# Patient Record
Sex: FEMALE | Race: WHITE | Employment: OTHER | URBAN - NONMETROPOLITAN AREA
[De-identification: names, ages, dates, MRNs, and addresses within clinical notes are randomized per-mention and may not be internally consistent; named-entity substitution may affect disease eponyms.]

---

## 2017-01-10 PROBLEM — I73.9 PAD (PERIPHERAL ARTERY DISEASE) (HCC): Status: ACTIVE | Noted: 2017-01-10

## 2017-01-19 ENCOUNTER — OFFICE VISIT (OUTPATIENT)
Dept: PHYSICAL MEDICINE AND REHAB | Age: 57
End: 2017-01-19

## 2017-01-19 VITALS
WEIGHT: 164.9 LBS | HEART RATE: 87 BPM | DIASTOLIC BLOOD PRESSURE: 85 MMHG | HEIGHT: 65 IN | BODY MASS INDEX: 27.47 KG/M2 | SYSTOLIC BLOOD PRESSURE: 137 MMHG

## 2017-01-19 DIAGNOSIS — E11.42 DIABETIC POLYNEUROPATHY ASSOCIATED WITH TYPE 2 DIABETES MELLITUS (HCC): ICD-10-CM

## 2017-01-19 DIAGNOSIS — S88.111D COMPLETE BELOW KNEE AMPUTATION OF RIGHT LOWER EXTREMITY, SUBSEQUENT ENCOUNTER: Primary | ICD-10-CM

## 2017-01-19 DIAGNOSIS — G54.6 PHANTOM LIMB PAIN (HCC): ICD-10-CM

## 2017-01-19 DIAGNOSIS — I69.351 HEMIPARESIS AFFECTING RIGHT SIDE AS LATE EFFECT OF STROKE (HCC): ICD-10-CM

## 2017-01-19 DIAGNOSIS — M79.605 LEFT LEG PAIN: ICD-10-CM

## 2017-01-19 PROCEDURE — G8427 DOCREV CUR MEDS BY ELIG CLIN: HCPCS | Performed by: PHYSICAL MEDICINE & REHABILITATION

## 2017-01-19 PROCEDURE — 3017F COLORECTAL CA SCREEN DOC REV: CPT | Performed by: PHYSICAL MEDICINE & REHABILITATION

## 2017-01-19 PROCEDURE — G8484 FLU IMMUNIZE NO ADMIN: HCPCS | Performed by: PHYSICAL MEDICINE & REHABILITATION

## 2017-01-19 PROCEDURE — 3046F HEMOGLOBIN A1C LEVEL >9.0%: CPT | Performed by: PHYSICAL MEDICINE & REHABILITATION

## 2017-01-19 PROCEDURE — 3014F SCREEN MAMMO DOC REV: CPT | Performed by: PHYSICAL MEDICINE & REHABILITATION

## 2017-01-19 PROCEDURE — 99213 OFFICE O/P EST LOW 20 MIN: CPT | Performed by: PHYSICAL MEDICINE & REHABILITATION

## 2017-01-19 PROCEDURE — G8419 CALC BMI OUT NRM PARAM NOF/U: HCPCS | Performed by: PHYSICAL MEDICINE & REHABILITATION

## 2017-01-19 PROCEDURE — G8598 ASA/ANTIPLAT THER USED: HCPCS | Performed by: PHYSICAL MEDICINE & REHABILITATION

## 2017-01-19 PROCEDURE — 4004F PT TOBACCO SCREEN RCVD TLK: CPT | Performed by: PHYSICAL MEDICINE & REHABILITATION

## 2017-01-24 ENCOUNTER — TELEPHONE (OUTPATIENT)
Dept: PHYSICAL MEDICINE AND REHAB | Age: 57
End: 2017-01-24

## 2017-01-25 DIAGNOSIS — F41.1 GAD (GENERALIZED ANXIETY DISORDER): ICD-10-CM

## 2017-01-25 RX ORDER — ALPRAZOLAM 1 MG/1
1 TABLET ORAL 3 TIMES DAILY PRN
Qty: 90 TABLET | Refills: 0 | Status: ON HOLD | OUTPATIENT
Start: 2017-01-25 | End: 2017-12-18 | Stop reason: ALTCHOICE

## 2017-02-15 ENCOUNTER — TELEPHONE (OUTPATIENT)
Dept: PHYSICAL MEDICINE AND REHAB | Age: 57
End: 2017-02-15

## 2017-02-15 DIAGNOSIS — G54.6 PHANTOM LIMB PAIN (HCC): ICD-10-CM

## 2017-02-15 DIAGNOSIS — E11.42 DIABETIC POLYNEUROPATHY ASSOCIATED WITH TYPE 2 DIABETES MELLITUS (HCC): ICD-10-CM

## 2017-02-16 ENCOUNTER — OFFICE VISIT (OUTPATIENT)
Dept: PHYSICAL MEDICINE AND REHAB | Age: 57
End: 2017-02-16

## 2017-02-16 VITALS
HEART RATE: 75 BPM | BODY MASS INDEX: 27.47 KG/M2 | DIASTOLIC BLOOD PRESSURE: 85 MMHG | WEIGHT: 164.9 LBS | SYSTOLIC BLOOD PRESSURE: 176 MMHG | HEIGHT: 65 IN

## 2017-02-16 DIAGNOSIS — M25.572 ACUTE LEFT ANKLE PAIN: ICD-10-CM

## 2017-02-16 DIAGNOSIS — E11.42 DIABETIC POLYNEUROPATHY ASSOCIATED WITH TYPE 2 DIABETES MELLITUS (HCC): ICD-10-CM

## 2017-02-16 DIAGNOSIS — F41.1 GAD (GENERALIZED ANXIETY DISORDER): ICD-10-CM

## 2017-02-16 DIAGNOSIS — G90.50 RSD (REFLEX SYMPATHETIC DYSTROPHY): ICD-10-CM

## 2017-02-16 DIAGNOSIS — G62.9 NEUROPATHY: ICD-10-CM

## 2017-02-16 DIAGNOSIS — I69.351 HEMIPARESIS AFFECTING RIGHT SIDE AS LATE EFFECT OF STROKE (HCC): ICD-10-CM

## 2017-02-16 DIAGNOSIS — S88.111D COMPLETE BELOW KNEE AMPUTATION OF RIGHT LOWER EXTREMITY, SUBSEQUENT ENCOUNTER: ICD-10-CM

## 2017-02-16 DIAGNOSIS — M79.605 LEFT LEG PAIN: ICD-10-CM

## 2017-02-16 DIAGNOSIS — Z89.511 STATUS POST BELOW KNEE AMPUTATION OF RIGHT LOWER EXTREMITY (HCC): ICD-10-CM

## 2017-02-16 DIAGNOSIS — Z89.511 S/P BKA (BELOW KNEE AMPUTATION) UNILATERAL, RIGHT (HCC): ICD-10-CM

## 2017-02-16 DIAGNOSIS — M46.1 SACROILIAC INFLAMMATION (HCC): ICD-10-CM

## 2017-02-16 DIAGNOSIS — M79.7 FIBROMYALGIA: ICD-10-CM

## 2017-02-16 DIAGNOSIS — M47.26 OSTEOARTHRITIS OF SPINE WITH RADICULOPATHY, LUMBAR REGION: Primary | ICD-10-CM

## 2017-02-16 DIAGNOSIS — G54.6 PHANTOM LIMB PAIN (HCC): ICD-10-CM

## 2017-02-16 PROCEDURE — G8484 FLU IMMUNIZE NO ADMIN: HCPCS | Performed by: PAIN MEDICINE

## 2017-02-16 PROCEDURE — 3046F HEMOGLOBIN A1C LEVEL >9.0%: CPT | Performed by: PAIN MEDICINE

## 2017-02-16 PROCEDURE — G8419 CALC BMI OUT NRM PARAM NOF/U: HCPCS | Performed by: PAIN MEDICINE

## 2017-02-16 PROCEDURE — 99215 OFFICE O/P EST HI 40 MIN: CPT | Performed by: PAIN MEDICINE

## 2017-02-16 PROCEDURE — G8598 ASA/ANTIPLAT THER USED: HCPCS | Performed by: PAIN MEDICINE

## 2017-02-16 PROCEDURE — G8427 DOCREV CUR MEDS BY ELIG CLIN: HCPCS | Performed by: PAIN MEDICINE

## 2017-02-16 PROCEDURE — 4004F PT TOBACCO SCREEN RCVD TLK: CPT | Performed by: PAIN MEDICINE

## 2017-02-16 PROCEDURE — 3017F COLORECTAL CA SCREEN DOC REV: CPT | Performed by: PAIN MEDICINE

## 2017-02-16 PROCEDURE — 3014F SCREEN MAMMO DOC REV: CPT | Performed by: PAIN MEDICINE

## 2017-02-16 RX ORDER — PREGABALIN 75 MG/1
75 CAPSULE ORAL 3 TIMES DAILY
Qty: 90 CAPSULE | Refills: 1 | Status: SHIPPED | OUTPATIENT
Start: 2017-02-16 | End: 2017-03-22 | Stop reason: SDUPTHER

## 2017-02-16 RX ORDER — OXYCODONE AND ACETAMINOPHEN 7.5; 325 MG/1; MG/1
1 TABLET ORAL EVERY 6 HOURS PRN
Qty: 60 TABLET | Refills: 0 | Status: SHIPPED | OUTPATIENT
Start: 2017-02-16 | End: 2017-03-03

## 2017-02-16 RX ORDER — IBUPROFEN 800 MG/1
800 TABLET ORAL EVERY 8 HOURS PRN
Qty: 90 TABLET | Refills: 1 | Status: SHIPPED | OUTPATIENT
Start: 2017-02-16 | End: 2017-03-28 | Stop reason: SDUPTHER

## 2017-02-16 RX ORDER — PHENAZOPYRIDINE HYDROCHLORIDE 200 MG/1
TABLET, FILM COATED ORAL
Refills: 0 | COMMUNITY
Start: 2017-02-07 | End: 2017-09-25 | Stop reason: ALTCHOICE

## 2017-02-16 ASSESSMENT — ENCOUNTER SYMPTOMS
SINUS PRESSURE: 0
COLOR CHANGE: 0
SHORTNESS OF BREATH: 0
ABDOMINAL PAIN: 0
DIARRHEA: 0
CHEST TIGHTNESS: 0
WHEEZING: 0
BACK PAIN: 0
COUGH: 0
VOMITING: 0
RHINORRHEA: 0
CONSTIPATION: 0
NAUSEA: 0
EYE PAIN: 0
PHOTOPHOBIA: 0
SORE THROAT: 0

## 2017-03-06 RX ORDER — OXYCODONE AND ACETAMINOPHEN 7.5; 325 MG/1; MG/1
1 TABLET ORAL EVERY 6 HOURS PRN
Qty: 120 TABLET | Refills: 0 | Status: SHIPPED | OUTPATIENT
Start: 2017-03-06 | End: 2017-03-22 | Stop reason: SDUPTHER

## 2017-03-22 ENCOUNTER — OFFICE VISIT (OUTPATIENT)
Dept: PHYSICAL MEDICINE AND REHAB | Age: 57
End: 2017-03-22

## 2017-03-22 VITALS
BODY MASS INDEX: 27.49 KG/M2 | HEIGHT: 65 IN | DIASTOLIC BLOOD PRESSURE: 94 MMHG | SYSTOLIC BLOOD PRESSURE: 196 MMHG | WEIGHT: 165 LBS | HEART RATE: 86 BPM

## 2017-03-22 DIAGNOSIS — M79.605 LEFT LEG PAIN: ICD-10-CM

## 2017-03-22 DIAGNOSIS — G54.6 PHANTOM LIMB PAIN (HCC): ICD-10-CM

## 2017-03-22 DIAGNOSIS — E11.42 DIABETIC POLYNEUROPATHY ASSOCIATED WITH TYPE 2 DIABETES MELLITUS (HCC): ICD-10-CM

## 2017-03-22 DIAGNOSIS — G62.9 NEUROPATHY: ICD-10-CM

## 2017-03-22 DIAGNOSIS — M47.26 OSTEOARTHRITIS OF SPINE WITH RADICULOPATHY, LUMBAR REGION: ICD-10-CM

## 2017-03-22 DIAGNOSIS — Z89.511 S/P BKA (BELOW KNEE AMPUTATION) UNILATERAL, RIGHT (HCC): ICD-10-CM

## 2017-03-22 DIAGNOSIS — I69.351 HEMIPARESIS AFFECTING RIGHT SIDE AS LATE EFFECT OF STROKE (HCC): ICD-10-CM

## 2017-03-22 DIAGNOSIS — G90.50 RSD (REFLEX SYMPATHETIC DYSTROPHY): Primary | ICD-10-CM

## 2017-03-22 DIAGNOSIS — M46.1 SACROILIAC INFLAMMATION (HCC): ICD-10-CM

## 2017-03-22 PROCEDURE — G8598 ASA/ANTIPLAT THER USED: HCPCS | Performed by: PAIN MEDICINE

## 2017-03-22 PROCEDURE — G8484 FLU IMMUNIZE NO ADMIN: HCPCS | Performed by: PAIN MEDICINE

## 2017-03-22 PROCEDURE — 4004F PT TOBACCO SCREEN RCVD TLK: CPT | Performed by: PAIN MEDICINE

## 2017-03-22 PROCEDURE — 99214 OFFICE O/P EST MOD 30 MIN: CPT | Performed by: PAIN MEDICINE

## 2017-03-22 PROCEDURE — 3046F HEMOGLOBIN A1C LEVEL >9.0%: CPT | Performed by: PAIN MEDICINE

## 2017-03-22 PROCEDURE — G8419 CALC BMI OUT NRM PARAM NOF/U: HCPCS | Performed by: PAIN MEDICINE

## 2017-03-22 PROCEDURE — 3014F SCREEN MAMMO DOC REV: CPT | Performed by: PAIN MEDICINE

## 2017-03-22 PROCEDURE — G8427 DOCREV CUR MEDS BY ELIG CLIN: HCPCS | Performed by: PAIN MEDICINE

## 2017-03-22 PROCEDURE — 3017F COLORECTAL CA SCREEN DOC REV: CPT | Performed by: PAIN MEDICINE

## 2017-03-22 RX ORDER — PREGABALIN 75 MG/1
75 CAPSULE ORAL 3 TIMES DAILY
Qty: 90 CAPSULE | Refills: 1 | Status: SHIPPED | OUTPATIENT
Start: 2017-03-22 | End: 2017-08-16 | Stop reason: SDUPTHER

## 2017-03-22 RX ORDER — OXYCODONE AND ACETAMINOPHEN 7.5; 325 MG/1; MG/1
1 TABLET ORAL EVERY 6 HOURS PRN
Qty: 120 TABLET | Refills: 0 | Status: SHIPPED | OUTPATIENT
Start: 2017-03-22 | End: 2017-04-21

## 2017-03-22 ASSESSMENT — ENCOUNTER SYMPTOMS
EYE PAIN: 0
WHEEZING: 0
COLOR CHANGE: 0
CONSTIPATION: 0
BACK PAIN: 0
PHOTOPHOBIA: 0
COUGH: 0
NAUSEA: 0
DIARRHEA: 0
CHEST TIGHTNESS: 0
SORE THROAT: 0
SHORTNESS OF BREATH: 0
VOMITING: 0
SINUS PRESSURE: 0
ABDOMINAL PAIN: 0
RHINORRHEA: 0

## 2017-03-28 DIAGNOSIS — M79.605 LEFT LEG PAIN: ICD-10-CM

## 2017-03-28 DIAGNOSIS — M46.1 SACROILIAC INFLAMMATION (HCC): ICD-10-CM

## 2017-03-28 DIAGNOSIS — M47.26 OSTEOARTHRITIS OF SPINE WITH RADICULOPATHY, LUMBAR REGION: ICD-10-CM

## 2017-03-28 DIAGNOSIS — G62.9 NEUROPATHY: ICD-10-CM

## 2017-03-28 DIAGNOSIS — G54.6 PHANTOM LIMB PAIN (HCC): ICD-10-CM

## 2017-03-28 DIAGNOSIS — Z89.511 S/P BKA (BELOW KNEE AMPUTATION) UNILATERAL, RIGHT (HCC): ICD-10-CM

## 2017-03-28 DIAGNOSIS — E11.42 DIABETIC POLYNEUROPATHY ASSOCIATED WITH TYPE 2 DIABETES MELLITUS (HCC): ICD-10-CM

## 2017-03-28 DIAGNOSIS — I69.351 HEMIPARESIS AFFECTING RIGHT SIDE AS LATE EFFECT OF STROKE (HCC): ICD-10-CM

## 2017-03-28 DIAGNOSIS — G90.50 RSD (REFLEX SYMPATHETIC DYSTROPHY): ICD-10-CM

## 2017-03-31 ENCOUNTER — TELEPHONE (OUTPATIENT)
Dept: PHYSICAL MEDICINE AND REHAB | Age: 57
End: 2017-03-31

## 2017-04-13 RX ORDER — PREGABALIN 75 MG/1
75 CAPSULE ORAL EVERY 6 HOURS
Qty: 120 CAPSULE | Refills: 0 | Status: SHIPPED | OUTPATIENT
Start: 2017-04-13 | End: 2017-05-18 | Stop reason: SDUPTHER

## 2017-04-26 ENCOUNTER — TELEPHONE (OUTPATIENT)
Dept: PHYSICAL MEDICINE AND REHAB | Age: 57
End: 2017-04-26

## 2017-05-03 ENCOUNTER — TELEPHONE (OUTPATIENT)
Dept: PHYSICAL MEDICINE AND REHAB | Age: 57
End: 2017-05-03

## 2017-05-04 RX ORDER — OXYCODONE AND ACETAMINOPHEN 7.5; 325 MG/1; MG/1
1 TABLET ORAL EVERY 6 HOURS PRN
Qty: 120 TABLET | Refills: 0 | Status: SHIPPED | OUTPATIENT
Start: 2017-05-04 | End: 2017-05-18 | Stop reason: SDUPTHER

## 2017-05-18 ENCOUNTER — OFFICE VISIT (OUTPATIENT)
Dept: PHYSICAL MEDICINE AND REHAB | Age: 57
End: 2017-05-18

## 2017-05-18 VITALS
HEART RATE: 84 BPM | WEIGHT: 165 LBS | DIASTOLIC BLOOD PRESSURE: 78 MMHG | SYSTOLIC BLOOD PRESSURE: 132 MMHG | HEIGHT: 65 IN | BODY MASS INDEX: 27.49 KG/M2

## 2017-05-18 DIAGNOSIS — Z89.511 STATUS POST BELOW KNEE AMPUTATION OF RIGHT LOWER EXTREMITY (HCC): ICD-10-CM

## 2017-05-18 DIAGNOSIS — G54.6 PHANTOM LIMB PAIN (HCC): ICD-10-CM

## 2017-05-18 DIAGNOSIS — I69.351 HEMIPARESIS AFFECTING RIGHT SIDE AS LATE EFFECT OF STROKE (HCC): ICD-10-CM

## 2017-05-18 DIAGNOSIS — S88.111D COMPLETE BELOW KNEE AMPUTATION OF RIGHT LOWER EXTREMITY, SUBSEQUENT ENCOUNTER: ICD-10-CM

## 2017-05-18 DIAGNOSIS — G90.50 RSD (REFLEX SYMPATHETIC DYSTROPHY): Primary | ICD-10-CM

## 2017-05-18 DIAGNOSIS — Z89.511 S/P BKA (BELOW KNEE AMPUTATION) UNILATERAL, RIGHT (HCC): ICD-10-CM

## 2017-05-18 DIAGNOSIS — F41.1 GAD (GENERALIZED ANXIETY DISORDER): ICD-10-CM

## 2017-05-18 DIAGNOSIS — M25.572 ACUTE LEFT ANKLE PAIN: ICD-10-CM

## 2017-05-18 DIAGNOSIS — E11.42 DIABETIC POLYNEUROPATHY ASSOCIATED WITH TYPE 2 DIABETES MELLITUS (HCC): ICD-10-CM

## 2017-05-18 DIAGNOSIS — M46.1 SACROILIAC INFLAMMATION (HCC): ICD-10-CM

## 2017-05-18 DIAGNOSIS — M47.26 OSTEOARTHRITIS OF SPINE WITH RADICULOPATHY, LUMBAR REGION: ICD-10-CM

## 2017-05-18 DIAGNOSIS — M79.605 LEFT LEG PAIN: ICD-10-CM

## 2017-05-18 DIAGNOSIS — M79.7 FIBROMYALGIA: ICD-10-CM

## 2017-05-18 DIAGNOSIS — G62.9 NEUROPATHY: ICD-10-CM

## 2017-05-18 PROCEDURE — 3046F HEMOGLOBIN A1C LEVEL >9.0%: CPT | Performed by: NURSE PRACTITIONER

## 2017-05-18 PROCEDURE — 4004F PT TOBACCO SCREEN RCVD TLK: CPT | Performed by: NURSE PRACTITIONER

## 2017-05-18 PROCEDURE — G8427 DOCREV CUR MEDS BY ELIG CLIN: HCPCS | Performed by: NURSE PRACTITIONER

## 2017-05-18 PROCEDURE — 3017F COLORECTAL CA SCREEN DOC REV: CPT | Performed by: NURSE PRACTITIONER

## 2017-05-18 PROCEDURE — G8598 ASA/ANTIPLAT THER USED: HCPCS | Performed by: NURSE PRACTITIONER

## 2017-05-18 PROCEDURE — 3014F SCREEN MAMMO DOC REV: CPT | Performed by: NURSE PRACTITIONER

## 2017-05-18 PROCEDURE — G8419 CALC BMI OUT NRM PARAM NOF/U: HCPCS | Performed by: NURSE PRACTITIONER

## 2017-05-18 PROCEDURE — 99215 OFFICE O/P EST HI 40 MIN: CPT | Performed by: NURSE PRACTITIONER

## 2017-05-18 RX ORDER — OXYCODONE AND ACETAMINOPHEN 7.5; 325 MG/1; MG/1
1 TABLET ORAL EVERY 6 HOURS PRN
Qty: 120 TABLET | Refills: 0 | Status: SHIPPED | OUTPATIENT
Start: 2017-06-02 | End: 2017-07-02

## 2017-05-18 RX ORDER — METHYLPREDNISOLONE 4 MG/1
TABLET ORAL
Qty: 1 KIT | Refills: 0 | Status: SHIPPED | OUTPATIENT
Start: 2017-05-18 | End: 2017-05-24

## 2017-05-18 RX ORDER — PREGABALIN 75 MG/1
75 CAPSULE ORAL EVERY 6 HOURS
Qty: 120 CAPSULE | Refills: 0 | Status: SHIPPED | OUTPATIENT
Start: 2017-06-02 | End: 2017-07-13 | Stop reason: DRUGHIGH

## 2017-05-18 RX ORDER — NAPROXEN 500 MG/1
500 TABLET ORAL 2 TIMES DAILY WITH MEALS
Qty: 28 TABLET | Refills: 0 | Status: ON HOLD | OUTPATIENT
Start: 2017-05-18 | End: 2017-12-18 | Stop reason: ALTCHOICE

## 2017-05-18 ASSESSMENT — ENCOUNTER SYMPTOMS
CHEST TIGHTNESS: 0
RECTAL PAIN: 0
ANAL BLEEDING: 0
CHOKING: 0
CONSTIPATION: 0
SORE THROAT: 0
PHOTOPHOBIA: 0
EYE PAIN: 0
EYE REDNESS: 0
WHEEZING: 0
EYE DISCHARGE: 0
DIARRHEA: 0
VOICE CHANGE: 0
ABDOMINAL PAIN: 0
EYE ITCHING: 0
BACK PAIN: 0
NAUSEA: 0
SINUS PRESSURE: 0
VOMITING: 0
COUGH: 0
STRIDOR: 0
APNEA: 0
ABDOMINAL DISTENTION: 0
SHORTNESS OF BREATH: 0
COLOR CHANGE: 0
TROUBLE SWALLOWING: 0
BLOOD IN STOOL: 0
FACIAL SWELLING: 0
RHINORRHEA: 0

## 2017-07-05 RX ORDER — OXYCODONE AND ACETAMINOPHEN 7.5; 325 MG/1; MG/1
1 TABLET ORAL EVERY 6 HOURS PRN
Qty: 8 TABLET | Refills: 0 | Status: SHIPPED | OUTPATIENT
Start: 2017-07-05 | End: 2017-07-06 | Stop reason: SDUPTHER

## 2017-07-06 RX ORDER — OXYCODONE AND ACETAMINOPHEN 7.5; 325 MG/1; MG/1
1 TABLET ORAL EVERY 6 HOURS PRN
Qty: 32 TABLET | Refills: 0 | Status: SHIPPED | OUTPATIENT
Start: 2017-07-06 | End: 2017-07-18 | Stop reason: SDUPTHER

## 2017-07-13 ENCOUNTER — OFFICE VISIT (OUTPATIENT)
Dept: PHYSICAL MEDICINE AND REHAB | Age: 57
End: 2017-07-13

## 2017-07-13 VITALS
HEART RATE: 83 BPM | WEIGHT: 170 LBS | SYSTOLIC BLOOD PRESSURE: 152 MMHG | BODY MASS INDEX: 28.32 KG/M2 | HEIGHT: 65 IN | DIASTOLIC BLOOD PRESSURE: 82 MMHG

## 2017-07-13 DIAGNOSIS — I69.351 HEMIPARESIS AFFECTING RIGHT SIDE AS LATE EFFECT OF STROKE (HCC): ICD-10-CM

## 2017-07-13 DIAGNOSIS — E11.42 DIABETIC POLYNEUROPATHY ASSOCIATED WITH TYPE 2 DIABETES MELLITUS (HCC): ICD-10-CM

## 2017-07-13 DIAGNOSIS — G90.50 RSD (REFLEX SYMPATHETIC DYSTROPHY): Primary | ICD-10-CM

## 2017-07-13 DIAGNOSIS — M79.7 FIBROMYALGIA: ICD-10-CM

## 2017-07-13 DIAGNOSIS — Z89.511 S/P BKA (BELOW KNEE AMPUTATION) UNILATERAL, RIGHT (HCC): ICD-10-CM

## 2017-07-13 DIAGNOSIS — M79.605 LEFT LEG PAIN: ICD-10-CM

## 2017-07-13 DIAGNOSIS — G54.6 PHANTOM LIMB PAIN (HCC): ICD-10-CM

## 2017-07-13 DIAGNOSIS — M47.26 OSTEOARTHRITIS OF SPINE WITH RADICULOPATHY, LUMBAR REGION: ICD-10-CM

## 2017-07-13 DIAGNOSIS — M46.1 SACROILIAC INFLAMMATION (HCC): ICD-10-CM

## 2017-07-13 DIAGNOSIS — G62.9 NEUROPATHY: ICD-10-CM

## 2017-07-13 PROCEDURE — 4004F PT TOBACCO SCREEN RCVD TLK: CPT | Performed by: NURSE PRACTITIONER

## 2017-07-13 PROCEDURE — G8427 DOCREV CUR MEDS BY ELIG CLIN: HCPCS | Performed by: NURSE PRACTITIONER

## 2017-07-13 PROCEDURE — G8419 CALC BMI OUT NRM PARAM NOF/U: HCPCS | Performed by: NURSE PRACTITIONER

## 2017-07-13 PROCEDURE — 99213 OFFICE O/P EST LOW 20 MIN: CPT | Performed by: NURSE PRACTITIONER

## 2017-07-13 PROCEDURE — 3017F COLORECTAL CA SCREEN DOC REV: CPT | Performed by: NURSE PRACTITIONER

## 2017-07-13 PROCEDURE — 3014F SCREEN MAMMO DOC REV: CPT | Performed by: NURSE PRACTITIONER

## 2017-07-13 PROCEDURE — G8598 ASA/ANTIPLAT THER USED: HCPCS | Performed by: NURSE PRACTITIONER

## 2017-07-13 PROCEDURE — 3046F HEMOGLOBIN A1C LEVEL >9.0%: CPT | Performed by: NURSE PRACTITIONER

## 2017-07-13 ASSESSMENT — ENCOUNTER SYMPTOMS
NAUSEA: 0
SINUS PRESSURE: 0
SORE THROAT: 0
CONSTIPATION: 0
PHOTOPHOBIA: 0
SHORTNESS OF BREATH: 0
COLOR CHANGE: 0
ABDOMINAL PAIN: 0
CHEST TIGHTNESS: 0
RHINORRHEA: 0
COUGH: 0
DIARRHEA: 0
BACK PAIN: 1
EYE PAIN: 0
VOMITING: 0
WHEEZING: 0

## 2017-07-14 ENCOUNTER — TELEPHONE (OUTPATIENT)
Dept: PHYSICAL MEDICINE AND REHAB | Age: 57
End: 2017-07-14

## 2017-07-14 DIAGNOSIS — M79.7 FIBROMYALGIA: Primary | ICD-10-CM

## 2017-07-18 RX ORDER — OXYCODONE AND ACETAMINOPHEN 7.5; 325 MG/1; MG/1
1 TABLET ORAL EVERY 6 HOURS PRN
Qty: 120 TABLET | Refills: 0 | Status: SHIPPED | OUTPATIENT
Start: 2017-07-18 | End: 2017-08-16 | Stop reason: SDUPTHER

## 2017-07-19 ENCOUNTER — TELEPHONE (OUTPATIENT)
Dept: PHYSICAL MEDICINE AND REHAB | Age: 57
End: 2017-07-19

## 2017-08-16 DIAGNOSIS — M47.26 OSTEOARTHRITIS OF SPINE WITH RADICULOPATHY, LUMBAR REGION: ICD-10-CM

## 2017-08-16 DIAGNOSIS — M46.1 SACROILIAC INFLAMMATION (HCC): ICD-10-CM

## 2017-08-16 DIAGNOSIS — G54.6 PHANTOM LIMB PAIN (HCC): ICD-10-CM

## 2017-08-16 DIAGNOSIS — G90.50 RSD (REFLEX SYMPATHETIC DYSTROPHY): ICD-10-CM

## 2017-08-16 DIAGNOSIS — G62.9 NEUROPATHY: ICD-10-CM

## 2017-08-16 DIAGNOSIS — E11.42 DIABETIC POLYNEUROPATHY ASSOCIATED WITH TYPE 2 DIABETES MELLITUS (HCC): ICD-10-CM

## 2017-08-16 DIAGNOSIS — I69.351 HEMIPARESIS AFFECTING RIGHT SIDE AS LATE EFFECT OF STROKE (HCC): ICD-10-CM

## 2017-08-16 DIAGNOSIS — M79.605 LEFT LEG PAIN: ICD-10-CM

## 2017-08-16 DIAGNOSIS — Z89.511 S/P BKA (BELOW KNEE AMPUTATION) UNILATERAL, RIGHT (HCC): ICD-10-CM

## 2017-08-16 RX ORDER — PREGABALIN 75 MG/1
75 CAPSULE ORAL EVERY 6 HOURS
Qty: 120 CAPSULE | Refills: 0 | Status: SHIPPED | OUTPATIENT
Start: 2017-08-16 | End: 2017-09-11 | Stop reason: SDUPTHER

## 2017-08-16 RX ORDER — OXYCODONE AND ACETAMINOPHEN 7.5; 325 MG/1; MG/1
1 TABLET ORAL EVERY 6 HOURS PRN
Qty: 120 TABLET | Refills: 0 | Status: SHIPPED | OUTPATIENT
Start: 2017-08-17 | End: 2017-09-11 | Stop reason: SDUPTHER

## 2017-08-25 ENCOUNTER — TELEPHONE (OUTPATIENT)
Dept: PHYSICAL MEDICINE AND REHAB | Age: 57
End: 2017-08-25

## 2017-09-07 ENCOUNTER — HOSPITAL ENCOUNTER (OUTPATIENT)
Age: 57
Setting detail: OUTPATIENT SURGERY
Discharge: HOME | End: 2017-09-07
Attending: PAIN MEDICINE | Admitting: PAIN MEDICINE
Payer: MEDICARE

## 2017-09-07 ENCOUNTER — ANESTHESIA EVENT (OUTPATIENT)
Dept: OPERATING ROOM | Age: 57
End: 2017-09-07
Payer: MEDICARE

## 2017-09-07 ENCOUNTER — APPOINTMENT (OUTPATIENT)
Dept: GENERAL RADIOLOGY | Age: 57
End: 2017-09-07
Attending: PAIN MEDICINE
Payer: MEDICARE

## 2017-09-07 ENCOUNTER — ANESTHESIA (OUTPATIENT)
Dept: OPERATING ROOM | Age: 57
End: 2017-09-07
Payer: MEDICARE

## 2017-09-07 VITALS — DIASTOLIC BLOOD PRESSURE: 92 MMHG | SYSTOLIC BLOOD PRESSURE: 151 MMHG | OXYGEN SATURATION: 99 %

## 2017-09-07 VITALS
RESPIRATION RATE: 15 BRPM | TEMPERATURE: 98.1 F | HEIGHT: 65 IN | BODY MASS INDEX: 28.32 KG/M2 | DIASTOLIC BLOOD PRESSURE: 96 MMHG | SYSTOLIC BLOOD PRESSURE: 150 MMHG | HEART RATE: 88 BPM | WEIGHT: 170 LBS | OXYGEN SATURATION: 96 %

## 2017-09-07 LAB — GLUCOSE BLD-MCNC: 186 MG/DL (ref 70–108)

## 2017-09-07 PROCEDURE — 2580000003 HC RX 258: Performed by: NURSE ANESTHETIST, CERTIFIED REGISTERED

## 2017-09-07 PROCEDURE — 3600000054 HC PAIN LEVEL 3 BASE: Performed by: PAIN MEDICINE

## 2017-09-07 PROCEDURE — 6360000004 HC RX CONTRAST MEDICATION: Performed by: PAIN MEDICINE

## 2017-09-07 PROCEDURE — 3209999900 FLUORO FOR SURGICAL PROCEDURES

## 2017-09-07 PROCEDURE — 3700000001 HC ADD 15 MINUTES (ANESTHESIA): Performed by: PAIN MEDICINE

## 2017-09-07 PROCEDURE — 6360000002 HC RX W HCPCS: Performed by: NURSE ANESTHETIST, CERTIFIED REGISTERED

## 2017-09-07 PROCEDURE — 2500000003 HC RX 250 WO HCPCS: Performed by: NURSE ANESTHETIST, CERTIFIED REGISTERED

## 2017-09-07 PROCEDURE — 77003 FLUOROGUIDE FOR SPINE INJECT: CPT | Performed by: PAIN MEDICINE

## 2017-09-07 PROCEDURE — 7100000011 HC PHASE II RECOVERY - ADDTL 15 MIN: Performed by: PAIN MEDICINE

## 2017-09-07 PROCEDURE — 7100000010 HC PHASE II RECOVERY - FIRST 15 MIN: Performed by: PAIN MEDICINE

## 2017-09-07 PROCEDURE — 64520 N BLOCK LUMBAR/THORACIC: CPT | Performed by: PAIN MEDICINE

## 2017-09-07 PROCEDURE — 3700000000 HC ANESTHESIA ATTENDED CARE: Performed by: PAIN MEDICINE

## 2017-09-07 PROCEDURE — 82948 REAGENT STRIP/BLOOD GLUCOSE: CPT

## 2017-09-07 PROCEDURE — 2500000003 HC RX 250 WO HCPCS: Performed by: PAIN MEDICINE

## 2017-09-07 PROCEDURE — 3600000055 HC PAIN LEVEL 3 ADDL 15 MIN: Performed by: PAIN MEDICINE

## 2017-09-07 RX ORDER — SODIUM CHLORIDE 9 MG/ML
INJECTION, SOLUTION INTRAVENOUS CONTINUOUS PRN
Status: DISCONTINUED | OUTPATIENT
Start: 2017-09-07 | End: 2017-09-07 | Stop reason: SDUPTHER

## 2017-09-07 RX ORDER — PROPOFOL 10 MG/ML
INJECTION, EMULSION INTRAVENOUS PRN
Status: DISCONTINUED | OUTPATIENT
Start: 2017-09-07 | End: 2017-09-07 | Stop reason: SDUPTHER

## 2017-09-07 RX ORDER — BUPIVACAINE HYDROCHLORIDE 2.5 MG/ML
INJECTION, SOLUTION EPIDURAL; INFILTRATION; INTRACAUDAL PRN
Status: DISCONTINUED | OUTPATIENT
Start: 2017-09-07 | End: 2017-09-07 | Stop reason: HOSPADM

## 2017-09-07 RX ORDER — LIDOCAINE HYDROCHLORIDE 10 MG/ML
INJECTION, SOLUTION INFILTRATION; PERINEURAL PRN
Status: DISCONTINUED | OUTPATIENT
Start: 2017-09-07 | End: 2017-09-07 | Stop reason: SDUPTHER

## 2017-09-07 RX ORDER — LIDOCAINE HYDROCHLORIDE 10 MG/ML
INJECTION, SOLUTION INFILTRATION; PERINEURAL PRN
Status: DISCONTINUED | OUTPATIENT
Start: 2017-09-07 | End: 2017-09-07 | Stop reason: HOSPADM

## 2017-09-07 RX ADMIN — LIDOCAINE HYDROCHLORIDE 20 MG: 10 INJECTION, SOLUTION INFILTRATION; PERINEURAL at 13:36

## 2017-09-07 RX ADMIN — PROPOFOL 50 MG: 10 INJECTION, EMULSION INTRAVENOUS at 13:36

## 2017-09-07 RX ADMIN — PROPOFOL 50 MG: 10 INJECTION, EMULSION INTRAVENOUS at 13:43

## 2017-09-07 RX ADMIN — SODIUM CHLORIDE: 9 INJECTION, SOLUTION INTRAVENOUS at 13:27

## 2017-09-07 RX ADMIN — PROPOFOL 50 MG: 10 INJECTION, EMULSION INTRAVENOUS at 13:39

## 2017-09-07 ASSESSMENT — PULMONARY FUNCTION TESTS
PIF_VALUE: 0

## 2017-09-07 ASSESSMENT — PAIN SCALES - GENERAL: PAINLEVEL_OUTOF10: 0

## 2017-09-07 ASSESSMENT — PAIN DESCRIPTION - DESCRIPTORS: DESCRIPTORS: CONSTANT;ACHING

## 2017-09-07 ASSESSMENT — PAIN - FUNCTIONAL ASSESSMENT: PAIN_FUNCTIONAL_ASSESSMENT: 0-10

## 2017-09-11 DIAGNOSIS — G54.6 PHANTOM LIMB PAIN (HCC): ICD-10-CM

## 2017-09-11 DIAGNOSIS — I69.351 HEMIPARESIS AFFECTING RIGHT SIDE AS LATE EFFECT OF STROKE (HCC): ICD-10-CM

## 2017-09-11 DIAGNOSIS — M47.26 OSTEOARTHRITIS OF SPINE WITH RADICULOPATHY, LUMBAR REGION: ICD-10-CM

## 2017-09-11 DIAGNOSIS — G90.50 RSD (REFLEX SYMPATHETIC DYSTROPHY): ICD-10-CM

## 2017-09-11 DIAGNOSIS — G62.9 NEUROPATHY: ICD-10-CM

## 2017-09-11 DIAGNOSIS — M79.605 LEFT LEG PAIN: ICD-10-CM

## 2017-09-11 DIAGNOSIS — Z89.511 S/P BKA (BELOW KNEE AMPUTATION) UNILATERAL, RIGHT (HCC): ICD-10-CM

## 2017-09-11 DIAGNOSIS — E11.42 DIABETIC POLYNEUROPATHY ASSOCIATED WITH TYPE 2 DIABETES MELLITUS (HCC): ICD-10-CM

## 2017-09-11 DIAGNOSIS — M46.1 SACROILIAC INFLAMMATION (HCC): ICD-10-CM

## 2017-09-12 RX ORDER — PREGABALIN 75 MG/1
75 CAPSULE ORAL EVERY 6 HOURS
Qty: 120 CAPSULE | Refills: 0 | Status: SHIPPED | OUTPATIENT
Start: 2017-09-16 | End: 2017-10-16 | Stop reason: SDUPTHER

## 2017-09-12 RX ORDER — OXYCODONE AND ACETAMINOPHEN 7.5; 325 MG/1; MG/1
1 TABLET ORAL EVERY 6 HOURS PRN
Qty: 120 TABLET | Refills: 0 | Status: SHIPPED | OUTPATIENT
Start: 2017-09-16 | End: 2017-10-16 | Stop reason: SDUPTHER

## 2017-09-25 ENCOUNTER — OFFICE VISIT (OUTPATIENT)
Dept: PHYSICAL MEDICINE AND REHAB | Age: 57
End: 2017-09-25
Payer: MEDICARE

## 2017-09-25 VITALS
BODY MASS INDEX: 28.32 KG/M2 | HEART RATE: 84 BPM | WEIGHT: 169.97 LBS | SYSTOLIC BLOOD PRESSURE: 118 MMHG | HEIGHT: 65 IN | DIASTOLIC BLOOD PRESSURE: 79 MMHG

## 2017-09-25 DIAGNOSIS — F41.1 GAD (GENERALIZED ANXIETY DISORDER): ICD-10-CM

## 2017-09-25 DIAGNOSIS — M47.26 OSTEOARTHRITIS OF SPINE WITH RADICULOPATHY, LUMBAR REGION: ICD-10-CM

## 2017-09-25 DIAGNOSIS — G62.9 NEUROPATHY: ICD-10-CM

## 2017-09-25 DIAGNOSIS — Z89.511 S/P BKA (BELOW KNEE AMPUTATION) UNILATERAL, RIGHT (HCC): ICD-10-CM

## 2017-09-25 DIAGNOSIS — M46.1 SACROILIAC INFLAMMATION (HCC): ICD-10-CM

## 2017-09-25 DIAGNOSIS — Z89.511 STATUS POST BELOW KNEE AMPUTATION OF RIGHT LOWER EXTREMITY (HCC): ICD-10-CM

## 2017-09-25 DIAGNOSIS — G54.6 PHANTOM LIMB PAIN (HCC): ICD-10-CM

## 2017-09-25 DIAGNOSIS — G90.50 RSD (REFLEX SYMPATHETIC DYSTROPHY): Primary | ICD-10-CM

## 2017-09-25 PROCEDURE — G8427 DOCREV CUR MEDS BY ELIG CLIN: HCPCS | Performed by: NURSE PRACTITIONER

## 2017-09-25 PROCEDURE — 3017F COLORECTAL CA SCREEN DOC REV: CPT | Performed by: NURSE PRACTITIONER

## 2017-09-25 PROCEDURE — G8598 ASA/ANTIPLAT THER USED: HCPCS | Performed by: NURSE PRACTITIONER

## 2017-09-25 PROCEDURE — 4004F PT TOBACCO SCREEN RCVD TLK: CPT | Performed by: NURSE PRACTITIONER

## 2017-09-25 PROCEDURE — 3014F SCREEN MAMMO DOC REV: CPT | Performed by: NURSE PRACTITIONER

## 2017-09-25 PROCEDURE — 99213 OFFICE O/P EST LOW 20 MIN: CPT | Performed by: NURSE PRACTITIONER

## 2017-09-25 PROCEDURE — G8417 CALC BMI ABV UP PARAM F/U: HCPCS | Performed by: NURSE PRACTITIONER

## 2017-09-25 ASSESSMENT — ENCOUNTER SYMPTOMS
SORE THROAT: 0
EYE DISCHARGE: 0
CONSTIPATION: 0
ANAL BLEEDING: 0
ABDOMINAL PAIN: 0
RESPIRATORY NEGATIVE: 1
DIARRHEA: 0
FACIAL SWELLING: 0
EYE PAIN: 0
SINUS PRESSURE: 0
RECTAL PAIN: 0
EYE ITCHING: 0
VOICE CHANGE: 0
STRIDOR: 0
APNEA: 0
EYE REDNESS: 0
TROUBLE SWALLOWING: 0
CHEST TIGHTNESS: 0
COUGH: 0
RHINORRHEA: 0
VOMITING: 0
GASTROINTESTINAL NEGATIVE: 1
ABDOMINAL DISTENTION: 0
CHOKING: 0
ALLERGIC/IMMUNOLOGIC NEGATIVE: 1
BLOOD IN STOOL: 0
WHEEZING: 0
SHORTNESS OF BREATH: 0
EYES NEGATIVE: 1
PHOTOPHOBIA: 0
COLOR CHANGE: 0
NAUSEA: 0
BACK PAIN: 1

## 2017-10-16 DIAGNOSIS — G54.6 PHANTOM LIMB PAIN (HCC): ICD-10-CM

## 2017-10-16 DIAGNOSIS — G90.50 RSD (REFLEX SYMPATHETIC DYSTROPHY): ICD-10-CM

## 2017-10-16 DIAGNOSIS — M79.605 LEFT LEG PAIN: ICD-10-CM

## 2017-10-16 DIAGNOSIS — G62.9 NEUROPATHY: ICD-10-CM

## 2017-10-16 DIAGNOSIS — Z89.511 S/P BKA (BELOW KNEE AMPUTATION) UNILATERAL, RIGHT (HCC): ICD-10-CM

## 2017-10-16 DIAGNOSIS — M47.26 OSTEOARTHRITIS OF SPINE WITH RADICULOPATHY, LUMBAR REGION: ICD-10-CM

## 2017-10-16 DIAGNOSIS — M46.1 SACROILIAC INFLAMMATION (HCC): ICD-10-CM

## 2017-10-16 DIAGNOSIS — I69.351 HEMIPARESIS AFFECTING RIGHT SIDE AS LATE EFFECT OF STROKE (HCC): ICD-10-CM

## 2017-10-16 DIAGNOSIS — E11.42 DIABETIC POLYNEUROPATHY ASSOCIATED WITH TYPE 2 DIABETES MELLITUS (HCC): ICD-10-CM

## 2017-10-16 RX ORDER — PREGABALIN 75 MG/1
75 CAPSULE ORAL EVERY 6 HOURS
Qty: 120 CAPSULE | Refills: 0 | Status: SHIPPED | OUTPATIENT
Start: 2017-10-19 | End: 2017-11-13 | Stop reason: SDUPTHER

## 2017-10-16 RX ORDER — OXYCODONE AND ACETAMINOPHEN 7.5; 325 MG/1; MG/1
1 TABLET ORAL EVERY 6 HOURS PRN
Qty: 120 TABLET | Refills: 0 | Status: SHIPPED | OUTPATIENT
Start: 2017-10-16 | End: 2017-11-13 | Stop reason: SDUPTHER

## 2017-10-16 NOTE — TELEPHONE ENCOUNTER
OARRS reviewed. UDS: positive for the lyrica and percocet as well as Alprazolam and Pregabalin . Last seen: 9/25/2017.  Follow-up: 10/30/2017

## 2017-11-13 DIAGNOSIS — M79.605 LEFT LEG PAIN: ICD-10-CM

## 2017-11-13 DIAGNOSIS — Z89.511 S/P BKA (BELOW KNEE AMPUTATION) UNILATERAL, RIGHT (HCC): ICD-10-CM

## 2017-11-13 DIAGNOSIS — G54.6 PHANTOM LIMB PAIN (HCC): ICD-10-CM

## 2017-11-13 DIAGNOSIS — G62.9 NEUROPATHY: ICD-10-CM

## 2017-11-13 DIAGNOSIS — E11.42 DIABETIC POLYNEUROPATHY ASSOCIATED WITH TYPE 2 DIABETES MELLITUS (HCC): ICD-10-CM

## 2017-11-13 DIAGNOSIS — M46.1 SACROILIAC INFLAMMATION (HCC): ICD-10-CM

## 2017-11-13 DIAGNOSIS — G90.50 RSD (REFLEX SYMPATHETIC DYSTROPHY): ICD-10-CM

## 2017-11-13 DIAGNOSIS — I69.351 HEMIPARESIS AFFECTING RIGHT SIDE AS LATE EFFECT OF STROKE (HCC): ICD-10-CM

## 2017-11-13 DIAGNOSIS — M47.26 OSTEOARTHRITIS OF SPINE WITH RADICULOPATHY, LUMBAR REGION: ICD-10-CM

## 2017-11-13 RX ORDER — PREGABALIN 75 MG/1
75 CAPSULE ORAL EVERY 6 HOURS
Qty: 120 CAPSULE | Refills: 0 | Status: SHIPPED | OUTPATIENT
Start: 2017-11-16 | End: 2017-12-05 | Stop reason: SDUPTHER

## 2017-11-13 RX ORDER — OXYCODONE AND ACETAMINOPHEN 7.5; 325 MG/1; MG/1
1 TABLET ORAL EVERY 6 HOURS PRN
Qty: 120 TABLET | Refills: 0 | Status: SHIPPED | OUTPATIENT
Start: 2017-11-15 | End: 2017-12-05 | Stop reason: SDUPTHER

## 2017-12-05 ENCOUNTER — OFFICE VISIT (OUTPATIENT)
Dept: PHYSICAL MEDICINE AND REHAB | Age: 57
End: 2017-12-05
Payer: MEDICARE

## 2017-12-05 VITALS
SYSTOLIC BLOOD PRESSURE: 140 MMHG | DIASTOLIC BLOOD PRESSURE: 88 MMHG | HEART RATE: 87 BPM | BODY MASS INDEX: 28.85 KG/M2 | WEIGHT: 169 LBS | HEIGHT: 64 IN

## 2017-12-05 DIAGNOSIS — I69.351 HEMIPARESIS AFFECTING RIGHT SIDE AS LATE EFFECT OF STROKE (HCC): ICD-10-CM

## 2017-12-05 DIAGNOSIS — M79.7 FIBROMYALGIA: ICD-10-CM

## 2017-12-05 DIAGNOSIS — G54.6 PHANTOM LIMB PAIN (HCC): ICD-10-CM

## 2017-12-05 DIAGNOSIS — M46.1 SACROILIAC INFLAMMATION (HCC): ICD-10-CM

## 2017-12-05 DIAGNOSIS — M79.605 LEFT LEG PAIN: ICD-10-CM

## 2017-12-05 DIAGNOSIS — M47.26 OSTEOARTHRITIS OF SPINE WITH RADICULOPATHY, LUMBAR REGION: Primary | ICD-10-CM

## 2017-12-05 DIAGNOSIS — Z89.511 S/P BKA (BELOW KNEE AMPUTATION) UNILATERAL, RIGHT (HCC): ICD-10-CM

## 2017-12-05 DIAGNOSIS — G62.9 NEUROPATHY: ICD-10-CM

## 2017-12-05 DIAGNOSIS — G90.50 RSD (REFLEX SYMPATHETIC DYSTROPHY): ICD-10-CM

## 2017-12-05 DIAGNOSIS — S88.111D COMPLETE BELOW KNEE AMPUTATION OF RIGHT LOWER EXTREMITY, SUBSEQUENT ENCOUNTER: ICD-10-CM

## 2017-12-05 DIAGNOSIS — E11.42 DIABETIC POLYNEUROPATHY ASSOCIATED WITH TYPE 2 DIABETES MELLITUS (HCC): ICD-10-CM

## 2017-12-05 PROCEDURE — G8417 CALC BMI ABV UP PARAM F/U: HCPCS | Performed by: NURSE PRACTITIONER

## 2017-12-05 PROCEDURE — G8598 ASA/ANTIPLAT THER USED: HCPCS | Performed by: NURSE PRACTITIONER

## 2017-12-05 PROCEDURE — 3046F HEMOGLOBIN A1C LEVEL >9.0%: CPT | Performed by: NURSE PRACTITIONER

## 2017-12-05 PROCEDURE — 99213 OFFICE O/P EST LOW 20 MIN: CPT | Performed by: NURSE PRACTITIONER

## 2017-12-05 PROCEDURE — 4004F PT TOBACCO SCREEN RCVD TLK: CPT | Performed by: NURSE PRACTITIONER

## 2017-12-05 PROCEDURE — G8484 FLU IMMUNIZE NO ADMIN: HCPCS | Performed by: NURSE PRACTITIONER

## 2017-12-05 PROCEDURE — 3014F SCREEN MAMMO DOC REV: CPT | Performed by: NURSE PRACTITIONER

## 2017-12-05 PROCEDURE — G8427 DOCREV CUR MEDS BY ELIG CLIN: HCPCS | Performed by: NURSE PRACTITIONER

## 2017-12-05 PROCEDURE — 3017F COLORECTAL CA SCREEN DOC REV: CPT | Performed by: NURSE PRACTITIONER

## 2017-12-05 RX ORDER — PREGABALIN 75 MG/1
75 CAPSULE ORAL EVERY 6 HOURS
Qty: 120 CAPSULE | Refills: 0 | Status: SHIPPED | OUTPATIENT
Start: 2017-12-14 | End: 2018-01-13

## 2017-12-05 RX ORDER — OXYCODONE AND ACETAMINOPHEN 7.5; 325 MG/1; MG/1
1 TABLET ORAL EVERY 6 HOURS PRN
Qty: 120 TABLET | Refills: 0 | Status: SHIPPED | OUTPATIENT
Start: 2017-12-13 | End: 2018-01-12

## 2017-12-05 ASSESSMENT — ENCOUNTER SYMPTOMS
RECTAL PAIN: 0
GASTROINTESTINAL NEGATIVE: 1
BACK PAIN: 1
COLOR CHANGE: 0
ABDOMINAL PAIN: 0
SHORTNESS OF BREATH: 0
EYE ITCHING: 0
APNEA: 0
RHINORRHEA: 0
VOMITING: 0
VOICE CHANGE: 0
CHEST TIGHTNESS: 0
PHOTOPHOBIA: 0
NAUSEA: 0
BLOOD IN STOOL: 0
SORE THROAT: 0
ABDOMINAL DISTENTION: 0
SINUS PRESSURE: 0
EYE REDNESS: 0
EYE DISCHARGE: 0
WHEEZING: 0
RESPIRATORY NEGATIVE: 1
STRIDOR: 0
EYES NEGATIVE: 1
ALLERGIC/IMMUNOLOGIC NEGATIVE: 1
EYE PAIN: 0
COUGH: 0
CHOKING: 0
DIARRHEA: 0
CONSTIPATION: 0
ANAL BLEEDING: 0
FACIAL SWELLING: 0
TROUBLE SWALLOWING: 0

## 2017-12-05 NOTE — PROGRESS NOTES
[facility]  [address]  Dept: [phone]  Dept Fax: [phone]: [phone]    Visit Date: 12/5/2017    Functionality Assessment/Goals Worksheet     On a scale of 0 (Does not Interfere) to 10 (Completely Interferes)     1. Which number describes how during the past week pain has interfered with       the following:  A. General Activity:  0  B. Mood: 7  C. Walking Ability:  5  D. Normal Work (Includes both work outside the home and housework):  0  E. Relations with Other People:   10  F. Sleep:   5  G. Enjoyment of Life:   4    2. Patient Prefers to Take their Pain Medications:     [x]  On a regular basis   []  Only when necessary    []  Does not take pain medications    3. What are the Patient's Goals/Expectations for Visiting Pain Management? [x]  Learn about my pain    [x]  Receive Medication   []  Physical Therapy     []  Treat Depression   []  Receive Injections    []  Treat Sleep   []  Deal with Anxiety and Stress   []  Treat Opoid Dependence/Addiction   []  Other:    HPI:   Eugenia Aguilar is a 61 y.o. female is here today for    Chief Complaint:  Low back pain bilateral leg and foot pain. HPI   Patient here today in wheelchair s/p RBKA  F/U Patient has had LESI, Lumbar Facet TFLESI in the past without relief. Had ordered Psych eval with Dr Harper Malave for possible SCS trial. She states she has not seen him yet, her home health nurse messed up her appointment. She has another appointment coming up. She continues to take Percocet, Lyrica. She states she fell out of bed 3 weeks ago, but her pain is better now. Medications reviewed. Patient denies  side effects with medications. Patient states she is  taking medications as prescribed. She denies receiving pain medications from other sources. She denies any ER visits since last visit. Pain scale with out pain medications is 10/10.   Pain scale with pain medications is  6/10. Last dose of Percocet Was on today  Drug screen reviewed from 2017 OK    The patient is allergic to latex; lorazepam; gabapentin; wellbutrin [bupropion]; and tape [adhesive tape]. Past Medical History  Damon Ernst  has a past medical history of Anxiety; Arthritis; Atherosclerotic PVD with ulceration: Left foot ulcer; CAD (coronary artery disease); Chest pressure; Chronic back pain; Chronic pain; Depression; Fibromyalgia; Hyperlipidemia; Hypertension; Mild mitral regurgitation; Neuropathy ([facility] [address] Onychomycosis; RSD (reflex sympathetic dystrophy); S/P CAB2012; Schizoaffective disorder ([facility] [address] Stroke [facility]); and Type II or unspecified type diabetes mellitus without mention of complication, not stated as uncontrolled. Past Surgical History  The patient  has a past surgical history that includes Hysterectomy; Cholecystectomy; lipoma resection; transthoracic echocardiogram (12); cardiovascular stress test (12); Coronary artery bypass graft (2012); Cardiac surgery (); Foot surgery (Right, 2014); Leg amputation below knee (Right, 14); other surgical history (Right, 10/31/14); other surgical history (Bilateral, 2017); other surgical history (Left, 2017); other surgical history (Bilateral, 2017); and Lumbar spine surgery (N/A, 2017). Family History  This patient's family history includes Cancer in her maternal grandmother and mother; Diabetes in her mother; Heart Disease in her father; Stroke in her mother. Social History  Damon Ernst  reports that she has been smoking Cigarettes. She has a 17.00 pack-year smoking history. She has never used smokeless tobacco. She reports that she does not drink alcohol or use drugs.     Medications    Current Outpatient Prescriptions:     [START ON 2017] oxyCODONE-acetaminophen (PERCOCET) 7.5-325 MG per tablet, Take 1 tablet by mouth every 6 hours as needed for Pain ., Disp: 120 tablet, Rfl: 0   [START ON 12/14/2017] pregabalin (LYRICA) 75 MG capsule, Take 1 capsule by mouth every 6 hours . , Disp: 120 capsule, Rfl: 0    ibuprofen (ADVIL;MOTRIN) 100 MG/5ML suspension, take 40 milliliters by mouth every 8 hours if needed, Disp: 946 mL, Rfl: 2    ALPRAZolam (XANAX) 1 MG tablet, Take 1 tablet by mouth 3 times daily as needed for Anxiety, Disp: 90 tablet, Rfl: 0    DULoxetine (CYMBALTA) 30 MG extended release capsule, Take 1 capsule by mouth daily, Disp: 30 capsule, Rfl: 11    Blood Glucose Monitoring Suppl (ONE TOUCH BASIC SYSTEM) W/DEVICE KIT, 1 kit by Does not apply route daily, Disp: 1 kit, Rfl: 0    SEROQUEL  MG XR tablet, take 1 tablet by mouth every evening, Disp: 30 tablet, Rfl: 5    metoprolol tartrate (LOPRESSOR) 25 MG tablet, Take 1 tablet by mouth 2 times daily, Disp: 60 tablet, Rfl: 5    lisinopril (PRINIVIL;ZESTRIL) 20 MG tablet, Take 1 tablet by mouth daily, Disp: 30 tablet, Rfl: 5    metFORMIN (GLUCOPHAGE) 500 MG tablet, Take 1 tablet by mouth 2 times daily (with meals), Disp: 60 tablet, Rfl: 5    diclofenac sodium 1 % GEL, Apply 2 g topically 3 times daily, Disp: 3 Tube, Rfl: 5    Elastic Bandages & Supports (POST-OP SHOE/SOFT TOP WOMEN) MISC, Wear when using foot to propel wheelchair. , Disp: 1 each, Rfl: 0    glucose monitoring kit (FREESTYLE) monitoring kit, Dispense ALL required supplies to test 4 times daily. Dx: 250.02 Any brand device is acceptable., Disp: 1 kit, Rfl: 11    insulin lispro (HUMALOG) 100 UNIT/ML injection vial, Inject 0-12 Units into the skin 3 times daily (before meals). (Patient taking differently: Inject 0-12 Units into the skin daily ), Disp: 1 vial, Rfl: 3    insulin glargine (LANTUS) 100 UNIT/ML injection pen, Inject 28 Units into the skin daily. , Disp: 3 Pen, Rfl: 0    Insulin Pen Needle 31G X 5 MM MISC, Inject insulin SQ 4 x daily (31Gx 3/16\") 5mm, Disp: 200 each, Rfl: 5    glucose blood VI test strips (ASCENSIA AUTODISC VI;ONE TOUCH ULTRA TEST VI) strip, Add lancets. Check 4 times per day; Patient needs NEW METER, STRIPS PLUS LANCETS. TESTING 4 TIMES DAILY, Disp: 200 strip, Rfl: 5    nitroGLYCERIN (NITROSTAT) 0.4 MG SL tablet, Place 1 tablet under the tongue every 5 minutes as needed for Chest pain., Disp: 25 tablet, Rfl: 3    aspirin 81 MG EC tablet, Take 81 mg by mouth daily. , Disp: , Rfl:     naproxen (NAPROSYN) 500 MG tablet, Take 1 tablet by mouth 2 times daily (with meals) for 14 days, Disp: 28 tablet, Rfl: 0    Subjective:      Review of Systems   Constitutional: Positive for activity change. Negative for appetite change, chills, diaphoresis, fatigue, fever and unexpected weight change. HENT: Negative. Negative for congestion, dental problem, drooling, ear discharge, ear pain, facial swelling, hearing loss, mouth sores, nosebleeds, postnasal drip, rhinorrhea, sinus pressure, sneezing, sore throat, tinnitus, trouble swallowing and voice change. Eyes: Negative. Negative for photophobia, pain, discharge, redness, itching and visual disturbance. Respiratory: Negative. Negative for apnea, cough, choking, chest tightness, shortness of breath, wheezing and stridor. Cardiovascular: Negative. Negative for chest pain, palpitations and leg swelling. Gastrointestinal: Negative. Negative for abdominal distention, abdominal pain, anal bleeding, blood in stool, constipation, diarrhea, nausea, rectal pain and vomiting. Endocrine: Negative. Negative for cold intolerance, heat intolerance, polydipsia, polyphagia and polyuria. Genitourinary: Negative. Negative for decreased urine volume, difficulty urinating, dyspareunia, dysuria, enuresis, flank pain, frequency, genital sores, hematuria, menstrual problem, pelvic pain, urgency, vaginal bleeding, vaginal discharge and vaginal pain. Musculoskeletal: Positive for arthralgias, back pain, gait problem, joint swelling, myalgias, neck pain and neck stiffness. Skin: Negative.   Negative for color change, pallor, rash and wound. Allergic/Immunologic: Negative. Negative for environmental allergies, food allergies and immunocompromised state. Neurological: Positive for weakness and numbness. Negative for dizziness, tremors, seizures, syncope, facial asymmetry, speech difficulty, light-headedness and headaches. Hematological: Negative. Negative for adenopathy. Does not bruise/bleed easily. Psychiatric/Behavioral: Negative. Negative for agitation, behavioral problems, confusion, decreased concentration, dysphoric mood, hallucinations, self-injury, sleep disturbance and suicidal ideas. The patient is not nervous/anxious and is not hyperactive. Objective:     Vitals:    12/05/17 1556   BP: (!) 140/88   Pulse: 87   Weight: 169 lb (76.7 kg)   Height: 5' 4\" (1.626 m)       Physical Exam   Constitutional: She is oriented to person, place, and time. She appears well-developed and well-nourished. No distress. HENT:   Head: Normocephalic and atraumatic. Right Ear: External ear normal.   Left Ear: External ear normal.   Nose: Nose normal.   Mouth/Throat: Oropharynx is clear and moist. No oropharyngeal exudate. Eyes: Conjunctivae and EOM are normal. Pupils are equal, round, and reactive to light. Right eye exhibits no discharge. Left eye exhibits no discharge. No scleral icterus. Neck: Normal range of motion and full passive range of motion without pain. Neck supple. No muscular tenderness present. No neck rigidity. No edema, no erythema and normal range of motion present. No thyromegaly present. Cardiovascular: Normal rate, regular rhythm, normal heart sounds and intact distal pulses. Exam reveals no gallop and no friction rub. No murmur heard. Pulmonary/Chest: Effort normal and breath sounds normal. No respiratory distress. She has no wheezes. She has no rales. She exhibits no tenderness. Abdominal: Soft. Bowel sounds are normal. She exhibits no distension. There is no tenderness. There is no rebound and no guarding. Musculoskeletal: She exhibits tenderness. Right shoulder: She exhibits decreased range of motion. Right elbow: She exhibits decreased range of motion. Right wrist: She exhibits decreased range of motion. Right hip: She exhibits tenderness. Left hip: She exhibits tenderness. Right knee: She exhibits decreased range of motion. Left knee: Tenderness found. Right ankle: She exhibits deformity. Left ankle: Tenderness. Cervical back: She exhibits tenderness. Thoracic back: She exhibits tenderness. Lumbar back: She exhibits tenderness, pain and spasm. Back:         Left upper arm: Normal.        Left forearm: Normal.        Right hand: She exhibits decreased range of motion. Left hand: Normal.        Right upper leg: She exhibits tenderness. Left upper leg: She exhibits tenderness. Right lower leg: She exhibits deformity. Left lower leg: She exhibits tenderness and swelling. Legs:       Right foot: There is deformity. Left foot: There is decreased range of motion, tenderness, bony tenderness and swelling. Feet:    Neurological: She is alert and oriented to person, place, and time. She is not disoriented. She displays no atrophy. A sensory deficit is present. No cranial nerve deficit. She exhibits normal muscle tone. She displays a negative Romberg sign. Gait abnormal. Coordination normal. She displays no Babinski's sign on the right side. Reflex Scores:       Tricep reflexes are 1+ on the right side and 1+ on the left side. Bicep reflexes are 1+ on the right side and 1+ on the left side. Brachioradialis reflexes are 1+ on the right side and 1+ on the left side. Patellar reflexes are 1+ on the left side. Achilles reflexes are 1+ on the left side.   MOTOR 4/5 LUE LLE 3/5 RUE RLE WITH RBKA  PHANTOM PAIN TO RLE  R HEMIPARESIS RESIDUAL FROM CVA  EXPRESSIVE APHASIA, DYSARTHRIA  LLE allodynia, increase sensitivity, swelling, skin shiny   Skin: Skin is warm. No rash noted. She is not diaphoretic. No erythema. No pallor. Psychiatric: She has a normal mood and affect. Her behavior is normal. Judgment and thought content normal. Her mood appears not anxious. Her affect is not angry, not blunt, not labile and not inappropriate. Her speech is not rapid and/or pressured, not delayed, not tangential and not slurred. She is not agitated, not aggressive, not hyperactive, not slowed, not withdrawn, not actively hallucinating and not combative. Thought content is not paranoid and not delusional. Cognition and memory are not impaired. She does not express impulsivity or inappropriate judgment. She does not exhibit a depressed mood. She expresses no homicidal and no suicidal ideation. She expresses no suicidal plans and no homicidal plans. She is communicative. She exhibits normal recent memory and normal remote memory. She is attentive. Nursing note and vitals reviewed. Assessment:     1. Osteoarthritis of spine with radiculopathy, lumbar region    2. Sacroiliac inflammation ([address]    3. Diabetic polyneuropathy associated with type 2 diabetes mellitus ([address]    4. Phantom limb pain ([address]    5. RSD (reflex sympathetic dystrophy)    6. Neuropathy ([address]    7. Left leg pain    8. Fibromyalgia    9. S/P BKA (below knee amputation) unilateral, right ([address]    10. Complete below knee amputation of right lower extremity, subsequent encounter ([address]    11. Hemiparesis affecting right side as late effect of stroke ([address]            Plan:      · OARRS reviewed. · Discussed long term side effects of medications. · Discussed tolerance, dependency and addiction. · Previous UDS reviewed. · UDS preformed today for compliance. · Patient told can not receive any pain medications from any other source. · Discussed with pt.may not be pain free.   · No

## 2017-12-11 ENCOUNTER — OFFICE VISIT (OUTPATIENT)
Dept: PSYCHIATRY | Age: 57
End: 2017-12-11
Payer: MEDICARE

## 2017-12-11 DIAGNOSIS — F25.0 SCHIZOAFFECTIVE DISORDER, BIPOLAR TYPE (HCC): Primary | ICD-10-CM

## 2017-12-11 PROCEDURE — 90792 PSYCH DIAG EVAL W/MED SRVCS: CPT | Performed by: PSYCHIATRY & NEUROLOGY

## 2017-12-11 PROCEDURE — 4004F PT TOBACCO SCREEN RCVD TLK: CPT | Performed by: PSYCHIATRY & NEUROLOGY

## 2017-12-11 NOTE — PROGRESS NOTES
Chief Complaint   Patient presents with    Psychiatric Evaluation     SCS evaluation     Scar Kunz is a 59-year-old single  female referred by Dr. Vivian Castro for spinal cord stimulator evaluation. I have actually seen her before, in the hospital.  She was admitted once to neurology in May 2014 and seen in consultation. She was subsequently transferred to psychiatry where I evaluated her and reached a diagnosis of schizoaffective disorder, bipolar subtype. She had been a patient of Dr. Belkys Weinberg prior to his leaving town. She was on Seroquel and remains on that at this time. I did not see any signs today of schizoaffective disorder. It was not included in the referral materials, and I did not find any symptoms when I evaluated her mental status. She did not tell me of anything like that, but her memory is poor and she has a mixed expressive and receptive aphasia as a result of a previous stroke. She tells me that she is on the Seroquel for sleep, but isn't sleeping well. She also is taking the Xanax, but told me that she was off duloxetine. However she had a bottle of duloxetine in her medications, and it is listed as one of her current medications. Thus the history that she is supplied is rather unreliable. At this time, she denies being depressed. She denies taking any antidepressant medication, though these were prescribed in the past.  They were included in the medications that she brought in for me to examine. I did go through a list of depressive symptoms and she does report that some of these are present. For instance, she has trouble falling asleep and feeling rested. She doesn't have much energy. She does report crying episodes but says they are relatively infrequent. She reports that she concentrates adequately and that she is able to enjoy some things like television programs, and her granddaughter who spent the night a few days ago.   She reports that her appetite is off and that she's lost about 10 pounds. She has no libido. She denied any suicidal or homicidal thoughts. I questioned her about manic and hypomanic features, all of which she denied. She did report some cleaning sprees but I did not find any other spree-like behavior. She denied any risky behaviors, irritability, or grandiose symptoms. I question her about family history, and she denied any such history in parents or grandparents. She did report that her father was alcoholic but denied that he did anything inappropriate or abusive to any family members. She described her mother as being \"mean. \"  Apparently this was directed only at [facility], who was the second oldest in a sibship of six. She explained that she was the \"bastard\" child of the group, all of the others had fathers. She said her mother picked on her. Medical:    [facility] has a number of serious conditions including type 2 diabetes, RSD, a BKA on the right, and history of myocardial infarction with three vessel bypass procedure, a CVA one year ago with right-sided symptoms. She has a mixed expressive/receptive aphasia from that. She's taking a statin for cholesterol, metformin for the diabetes, several forms of insulin, oxycodone, and Lyrica, and has a prn of nitroglycerin which does not look to have been used in quite some time. She is also currently on Seroquel  in the evening, and Xanax 1 mg 3 times a day when necessary. Duloxetine 30 mg appears to be prescribed. Education:    High school graduate. Employment:    She left home at 24 to escape her mother. I think she had an apartment of her own. 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 She said she did [employer] for him. She's also worked as a maid, and as a  in [employer]. She is currently disabled by her RSD. That happened in 2001 she reported.     Marital:    She's never been , but does have two children, a boy and a girl. Both live nearby and she is able to see them. She has two grandchildren, and an additional as yet unborn grandchild. Family history:    She denied any family diagnoses except for her father's alcohol abuse. Personal psychiatric history:     She did not tell me of any of her previous psychiatric history. I did find it in the chart however. Mental Status Examination    Level of consciousness:  within normal limits  Appearance:  well-appearing, street clothes, in chair, good grooming and good hygiene  Behavior/Motor:  no abnormalities noted  Attitude toward examiner:  cooperative, attentive and good eye contact  Speech:  spontaneous,slow and labored, normal volume and articulation variable  Mood:  euthymic  Affect:  mood congruent  Thought processes:  linear, goal directed and coherent  Thought content:  Homocidal ideation: none  Suicidal Ideation:  denies suicidal ideation  Delusions: paranoid  Perceptual Disturbance:  denies any perceptual disturbance  Cognition:  oriented to person, place, and time  Concentration failed serial 7s and 5-letter word backwards  Memory impaired immediate recall and recent memory  Fund of knowledge:  fair  Abstract thinking:  mixed  Insight:  fair  Judgment:  good    DIAGNOSTIC IMPRESSION  SA-bipolar type by history    Plan  OK for SCS    If I were treating, I would increase Cymbalta to 60 (or more) daily, and might add mirtazapine 7.5 mg at bedtime to improve sleep. Current Outpatient Prescriptions   Medication Sig Dispense Refill    [START ON 12/13/2017] oxyCODONE-acetaminophen (PERCOCET) 7.5-325 MG per tablet Take 1 tablet by mouth every 6 hours as needed for Pain . 120 tablet 0    [START ON 12/14/2017] pregabalin (LYRICA) 75 MG capsule Take 1 capsule by mouth every 6 hours .  120 capsule 0    ibuprofen (ADVIL;MOTRIN) 100 MG/5ML suspension take 40 milliliters by mouth every 8 hours if needed 946 mL 2    naproxen (NAPROSYN) 500 MG tablet Take 1 tablet by mouth 2 times daily (with meals) for 14 days 28 tablet 0    ALPRAZolam (XANAX) 1 MG tablet Take 1 tablet by mouth 3 times daily as needed for Anxiety 90 tablet 0    DULoxetine (CYMBALTA) 30 MG extended release capsule Take 1 capsule by mouth daily 30 capsule 11    Blood Glucose Monitoring Suppl (ONE TOUCH [employer] SYSTEM) W/DEVICE KIT 1 kit by Does not apply route daily 1 kit 0    SEROQUEL  MG XR tablet take 1 tablet by mouth every evening 30 tablet 5    metoprolol tartrate (LOPRESSOR) 25 MG tablet Take 1 tablet by mouth 2 times daily 60 tablet 5    lisinopril (PRINIVIL;ZESTRIL) 20 MG tablet Take 1 tablet by mouth daily 30 tablet 5    metFORMIN (GLUCOPHAGE) 500 MG tablet Take 1 tablet by mouth 2 times daily (with meals) 60 tablet 5    diclofenac sodium 1 % GEL Apply 2 g topically 3 times daily 3 Tube 5    Elastic Bandages & Supports (POST-OP SHOE/SOFT TOP WOMEN) MISC Wear when using foot to propel wheelchair. 1 each 0    glucose monitoring kit (FREESTYLE) monitoring kit Dispense ALL required supplies to test 4 times daily. Dx: 250.02 Any brand device is acceptable. 1 kit 11    insulin lispro (HUMALOG) 100 UNIT/ML injection vial Inject 0-12 Units into the skin 3 times daily (before meals). (Patient taking differently: Inject 0-12 Units into the skin daily ) 1 vial 3    insulin glargine (LANTUS) 100 UNIT/ML injection pen Inject 28 Units into the skin daily. 3 Pen 0    Insulin Pen Needle 31G X 5 MM MISC Inject insulin SQ 4 x daily (31Gx 3/16\") 5mm 200 each 5    glucose blood VI test strips (ASCENSIA AUTODISC VI;ONE TOUCH ULTRA TEST VI) strip Add lancets. Check 4 times per day; Patient needs NEW METER, STRIPS PLUS LANCETS. TESTING 4 TIMES DAILY 200 strip 5    nitroGLYCERIN (NITROSTAT) 0.4 MG SL tablet Place 1 tablet under the tongue every 5 minutes as needed for Chest pain. 25 tablet 3    aspirin 81 MG EC tablet Take 81 mg by mouth daily.          No current facility-administered medications for this visit.

## 2017-12-14 ENCOUNTER — CARE COORDINATOR VISIT (OUTPATIENT)
Dept: CASE MANAGEMENT | Age: 57
End: 2017-12-14

## 2017-12-14 ENCOUNTER — APPOINTMENT (OUTPATIENT)
Dept: CT IMAGING | Age: 57
DRG: 193 | End: 2017-12-14
Payer: MEDICARE

## 2017-12-14 ENCOUNTER — HOSPITAL ENCOUNTER (INPATIENT)
Age: 57
LOS: 7 days | Discharge: ANOTHER HOSPITAL | DRG: 193 | End: 2017-12-21
Attending: INTERNAL MEDICINE | Admitting: INTERNAL MEDICINE
Payer: MEDICARE

## 2017-12-14 DIAGNOSIS — R10.84 GENERALIZED ABDOMINAL PAIN: Primary | ICD-10-CM

## 2017-12-14 DIAGNOSIS — I10 BENIGN ESSENTIAL HTN: ICD-10-CM

## 2017-12-14 DIAGNOSIS — G47.19 EXCESSIVE DAYTIME SLEEPINESS: ICD-10-CM

## 2017-12-14 DIAGNOSIS — Z79.4 UNCONTROLLED TYPE 2 DIABETES MELLITUS WITH HYPERGLYCEMIA, WITH LONG-TERM CURRENT USE OF INSULIN (HCC): ICD-10-CM

## 2017-12-14 DIAGNOSIS — R07.9 CHEST PAIN, UNSPECIFIED TYPE: ICD-10-CM

## 2017-12-14 DIAGNOSIS — N30.01 ACUTE CYSTITIS WITH HEMATURIA: ICD-10-CM

## 2017-12-14 DIAGNOSIS — K52.9 GASTROENTERITIS: ICD-10-CM

## 2017-12-14 DIAGNOSIS — R06.83 SNORING: ICD-10-CM

## 2017-12-14 DIAGNOSIS — J18.9 PNEUMONIA DUE TO ORGANISM: ICD-10-CM

## 2017-12-14 DIAGNOSIS — I25.10 CORONARY ARTERY DISEASE INVOLVING NATIVE CORONARY ARTERY OF NATIVE HEART WITHOUT ANGINA PECTORIS: ICD-10-CM

## 2017-12-14 DIAGNOSIS — R77.8 ELEVATED TROPONIN: ICD-10-CM

## 2017-12-14 DIAGNOSIS — E11.65 UNCONTROLLED TYPE 2 DIABETES MELLITUS WITH HYPERGLYCEMIA, WITH LONG-TERM CURRENT USE OF INSULIN (HCC): ICD-10-CM

## 2017-12-14 PROBLEM — R09.02 HYPOXIA: Status: ACTIVE | Noted: 2017-12-14

## 2017-12-14 PROBLEM — N30.00 ACUTE CYSTITIS: Status: ACTIVE | Noted: 2017-12-14

## 2017-12-14 PROBLEM — J94.8 PLEURAL MASS: Status: ACTIVE | Noted: 2017-12-14

## 2017-12-14 LAB
ALBUMIN SERPL-MCNC: 3.7 G/DL (ref 3.5–5.1)
ALP BLD-CCNC: 84 U/L (ref 38–126)
ALT SERPL-CCNC: 7 U/L (ref 11–66)
ANION GAP SERPL CALCULATED.3IONS-SCNC: 13 MEQ/L (ref 8–16)
APTT: 26.1 SECONDS (ref 22–38)
AST SERPL-CCNC: 10 U/L (ref 5–40)
BACTERIA: ABNORMAL /HPF
BASOPHILS # BLD: 0.3 %
BASOPHILS ABSOLUTE: 0 THOU/MM3 (ref 0–0.1)
BILIRUB SERPL-MCNC: 0.6 MG/DL (ref 0.3–1.2)
BILIRUBIN URINE: ABNORMAL
BLOOD, URINE: ABNORMAL
BUN BLDV-MCNC: 15 MG/DL (ref 7–22)
CALCIUM SERPL-MCNC: 9 MG/DL (ref 8.5–10.5)
CASTS 2: ABNORMAL /LPF
CASTS UA: ABNORMAL /LPF
CHARACTER, URINE: ABNORMAL
CHLORIDE BLD-SCNC: 96 MEQ/L (ref 98–111)
CHP ED QC CHECK: YES
CO2: 29 MEQ/L (ref 23–33)
COLOR: YELLOW
CREAT SERPL-MCNC: 0.8 MG/DL (ref 0.4–1.2)
CRYSTALS, UA: ABNORMAL
D-DIMER QUANTITATIVE: 1302 NG/ML FEU (ref 0–500)
EOSINOPHIL # BLD: 0 %
EOSINOPHILS ABSOLUTE: 0 THOU/MM3 (ref 0–0.4)
EPITHELIAL CELLS, UA: ABNORMAL /HPF
GFR SERPL CREATININE-BSD FRML MDRD: 74 ML/MIN/1.73M2
GLUCOSE BLD-MCNC: 209 MG/DL (ref 70–108)
GLUCOSE BLD-MCNC: 288 MG/DL
GLUCOSE BLD-MCNC: 288 MG/DL (ref 70–108)
GLUCOSE BLD-MCNC: 291 MG/DL (ref 70–108)
GLUCOSE BLD-MCNC: 342 MG/DL (ref 70–108)
GLUCOSE URINE: >= 1000 MG/DL
HCT VFR BLD CALC: 37.1 % (ref 37–47)
HCT VFR BLD CALC: 42.7 % (ref 37–47)
HEMOGLOBIN: 12.2 GM/DL (ref 12–16)
HEMOGLOBIN: 14.1 GM/DL (ref 12–16)
ICTOTEST: NEGATIVE
KETONES, URINE: ABNORMAL
LACTIC ACID: 1.6 MMOL/L (ref 0.5–2.2)
LACTIC ACID: 2 MMOL/L (ref 0.5–2.2)
LEUKOCYTE ESTERASE, URINE: NEGATIVE
LIPASE: 15.9 U/L (ref 5.6–51.3)
LYMPHOCYTES # BLD: 5.9 %
LYMPHOCYTES ABSOLUTE: 0.9 THOU/MM3 (ref 1–4.8)
MCH RBC QN AUTO: 31.1 PG (ref 27–31)
MCH RBC QN AUTO: 31.7 PG (ref 27–31)
MCHC RBC AUTO-ENTMCNC: 32.7 GM/DL (ref 33–37)
MCHC RBC AUTO-ENTMCNC: 33.1 GM/DL (ref 33–37)
MCV RBC AUTO: 93.9 FL (ref 81–99)
MCV RBC AUTO: 96.8 FL (ref 81–99)
MISCELLANEOUS 2: ABNORMAL
MONOCYTES # BLD: 5.9 %
MONOCYTES ABSOLUTE: 0.9 THOU/MM3 (ref 0.4–1.3)
NITRITE, URINE: NEGATIVE
NUCLEATED RED BLOOD CELLS: 0 /100 WBC
OSMOLALITY CALCULATION: 290 MOSMOL/KG (ref 275–300)
PDW BLD-RTO: 13.4 % (ref 11.5–14.5)
PDW BLD-RTO: 13.8 % (ref 11.5–14.5)
PH UA: 5.5
PLATELET # BLD: 165 THOU/MM3 (ref 130–400)
PLATELET # BLD: 202 THOU/MM3 (ref 130–400)
PMV BLD AUTO: 9.1 MCM (ref 7.4–10.4)
PMV BLD AUTO: 9.2 MCM (ref 7.4–10.4)
POTASSIUM SERPL-SCNC: 4.1 MEQ/L (ref 3.5–5.2)
PRO-BNP: 2714 PG/ML (ref 0–900)
PROTEIN UA: 300
RBC # BLD: 3.84 MILL/MM3 (ref 4.2–5.4)
RBC # BLD: 4.55 MILL/MM3 (ref 4.2–5.4)
RBC URINE: ABNORMAL /HPF
RENAL EPITHELIAL, UA: ABNORMAL
SEG NEUTROPHILS: 87.9 %
SEGMENTED NEUTROPHILS ABSOLUTE COUNT: 13.1 THOU/MM3 (ref 1.8–7.7)
SODIUM BLD-SCNC: 138 MEQ/L (ref 135–145)
SPECIFIC GRAVITY, URINE: > 1.03 (ref 1–1.03)
TOTAL PROTEIN: 7.3 G/DL (ref 6.1–8)
TROPONIN T: 0.02 NG/ML
TROPONIN T: 0.03 NG/ML
UROBILINOGEN, URINE: 1 EU/DL
WBC # BLD: 11.9 THOU/MM3 (ref 4.8–10.8)
WBC # BLD: 14.9 THOU/MM3 (ref 4.8–10.8)
WBC UA: ABNORMAL /HPF
YEAST: ABNORMAL

## 2017-12-14 PROCEDURE — 6370000000 HC RX 637 (ALT 250 FOR IP): Performed by: PHYSICIAN ASSISTANT

## 2017-12-14 PROCEDURE — 84484 ASSAY OF TROPONIN QUANT: CPT

## 2017-12-14 PROCEDURE — 87077 CULTURE AEROBIC IDENTIFY: CPT

## 2017-12-14 PROCEDURE — 83690 ASSAY OF LIPASE: CPT

## 2017-12-14 PROCEDURE — 36415 COLL VENOUS BLD VENIPUNCTURE: CPT

## 2017-12-14 PROCEDURE — 80053 COMPREHEN METABOLIC PANEL: CPT

## 2017-12-14 PROCEDURE — 93005 ELECTROCARDIOGRAM TRACING: CPT

## 2017-12-14 PROCEDURE — 1200000003 HC TELEMETRY R&B

## 2017-12-14 PROCEDURE — A4614 HAND-HELD PEFR METER: HCPCS

## 2017-12-14 PROCEDURE — 87641 MR-STAPH DNA AMP PROBE: CPT

## 2017-12-14 PROCEDURE — 2580000003 HC RX 258: Performed by: FAMILY MEDICINE

## 2017-12-14 PROCEDURE — 74177 CT ABD & PELVIS W/CONTRAST: CPT

## 2017-12-14 PROCEDURE — 85379 FIBRIN DEGRADATION QUANT: CPT

## 2017-12-14 PROCEDURE — 85027 COMPLETE CBC AUTOMATED: CPT

## 2017-12-14 PROCEDURE — 99285 EMERGENCY DEPT VISIT HI MDM: CPT

## 2017-12-14 PROCEDURE — 87086 URINE CULTURE/COLONY COUNT: CPT

## 2017-12-14 PROCEDURE — 6360000004 HC RX CONTRAST MEDICATION: Performed by: PHYSICIAN ASSISTANT

## 2017-12-14 PROCEDURE — 83605 ASSAY OF LACTIC ACID: CPT

## 2017-12-14 PROCEDURE — 82948 REAGENT STRIP/BLOOD GLUCOSE: CPT

## 2017-12-14 PROCEDURE — 87081 CULTURE SCREEN ONLY: CPT

## 2017-12-14 PROCEDURE — 87186 SC STD MICRODIL/AGAR DIL: CPT

## 2017-12-14 PROCEDURE — 85730 THROMBOPLASTIN TIME PARTIAL: CPT

## 2017-12-14 PROCEDURE — 96374 THER/PROPH/DIAG INJ IV PUSH: CPT

## 2017-12-14 PROCEDURE — 81001 URINALYSIS AUTO W/SCOPE: CPT

## 2017-12-14 PROCEDURE — 87040 BLOOD CULTURE FOR BACTERIA: CPT

## 2017-12-14 PROCEDURE — 71250 CT THORAX DX C-: CPT

## 2017-12-14 PROCEDURE — 83036 HEMOGLOBIN GLYCOSYLATED A1C: CPT

## 2017-12-14 PROCEDURE — 83880 ASSAY OF NATRIURETIC PEPTIDE: CPT

## 2017-12-14 PROCEDURE — 87184 SC STD DISK METHOD PER PLATE: CPT

## 2017-12-14 PROCEDURE — 6360000002 HC RX W HCPCS: Performed by: PHYSICIAN ASSISTANT

## 2017-12-14 PROCEDURE — 84145 PROCALCITONIN (PCT): CPT

## 2017-12-14 PROCEDURE — 99223 1ST HOSP IP/OBS HIGH 75: CPT | Performed by: FAMILY MEDICINE

## 2017-12-14 PROCEDURE — 96375 TX/PRO/DX INJ NEW DRUG ADDON: CPT

## 2017-12-14 PROCEDURE — 85025 COMPLETE CBC W/AUTO DIFF WBC: CPT

## 2017-12-14 PROCEDURE — 2580000003 HC RX 258: Performed by: PHYSICIAN ASSISTANT

## 2017-12-14 RX ORDER — ONDANSETRON 2 MG/ML
4 INJECTION INTRAMUSCULAR; INTRAVENOUS EVERY 6 HOURS PRN
Status: DISCONTINUED | OUTPATIENT
Start: 2017-12-14 | End: 2017-12-21 | Stop reason: HOSPADM

## 2017-12-14 RX ORDER — SODIUM CHLORIDE 0.9 % (FLUSH) 0.9 %
10 SYRINGE (ML) INJECTION PRN
Status: DISCONTINUED | OUTPATIENT
Start: 2017-12-14 | End: 2017-12-21 | Stop reason: HOSPADM

## 2017-12-14 RX ORDER — ACETAMINOPHEN 325 MG/1
650 TABLET ORAL EVERY 4 HOURS PRN
Status: DISCONTINUED | OUTPATIENT
Start: 2017-12-14 | End: 2017-12-14 | Stop reason: SDUPTHER

## 2017-12-14 RX ORDER — FAMOTIDINE 20 MG/1
20 TABLET, FILM COATED ORAL 2 TIMES DAILY
Status: DISCONTINUED | OUTPATIENT
Start: 2017-12-14 | End: 2017-12-14 | Stop reason: SDUPTHER

## 2017-12-14 RX ORDER — DULOXETIN HYDROCHLORIDE 30 MG/1
30 CAPSULE, DELAYED RELEASE ORAL DAILY
Status: DISCONTINUED | OUTPATIENT
Start: 2017-12-15 | End: 2017-12-21 | Stop reason: HOSPADM

## 2017-12-14 RX ORDER — DEXTROSE AND SODIUM CHLORIDE 5; .9 G/100ML; G/100ML
INJECTION, SOLUTION INTRAVENOUS PRN
Status: DISCONTINUED | OUTPATIENT
Start: 2017-12-14 | End: 2017-12-21 | Stop reason: HOSPADM

## 2017-12-14 RX ORDER — 0.9 % SODIUM CHLORIDE 0.9 %
1000 INTRAVENOUS SOLUTION INTRAVENOUS ONCE
Status: COMPLETED | OUTPATIENT
Start: 2017-12-14 | End: 2017-12-14

## 2017-12-14 RX ORDER — IPRATROPIUM BROMIDE AND ALBUTEROL SULFATE 2.5; .5 MG/3ML; MG/3ML
1 SOLUTION RESPIRATORY (INHALATION) EVERY 4 HOURS PRN
Status: DISCONTINUED | OUTPATIENT
Start: 2017-12-14 | End: 2017-12-21 | Stop reason: HOSPADM

## 2017-12-14 RX ORDER — ONDANSETRON 2 MG/ML
4 INJECTION INTRAMUSCULAR; INTRAVENOUS EVERY 6 HOURS PRN
Status: DISCONTINUED | OUTPATIENT
Start: 2017-12-14 | End: 2017-12-14 | Stop reason: SDUPTHER

## 2017-12-14 RX ORDER — FAMOTIDINE 20 MG/1
20 TABLET, FILM COATED ORAL 2 TIMES DAILY
Status: DISCONTINUED | OUTPATIENT
Start: 2017-12-14 | End: 2017-12-21 | Stop reason: HOSPADM

## 2017-12-14 RX ORDER — SODIUM CHLORIDE 0.9 % (FLUSH) 0.9 %
10 SYRINGE (ML) INJECTION EVERY 12 HOURS SCHEDULED
Status: DISCONTINUED | OUTPATIENT
Start: 2017-12-14 | End: 2017-12-14 | Stop reason: SDUPTHER

## 2017-12-14 RX ORDER — DEXTROSE MONOHYDRATE 50 MG/ML
100 INJECTION, SOLUTION INTRAVENOUS PRN
Status: DISCONTINUED | OUTPATIENT
Start: 2017-12-14 | End: 2017-12-14 | Stop reason: ALTCHOICE

## 2017-12-14 RX ORDER — HEPARIN SODIUM 1000 [USP'U]/ML
40 INJECTION, SOLUTION INTRAVENOUS; SUBCUTANEOUS PRN
Status: DISCONTINUED | OUTPATIENT
Start: 2017-12-14 | End: 2017-12-18 | Stop reason: ALTCHOICE

## 2017-12-14 RX ORDER — HEPARIN SODIUM 1000 [USP'U]/ML
80 INJECTION, SOLUTION INTRAVENOUS; SUBCUTANEOUS ONCE
Status: COMPLETED | OUTPATIENT
Start: 2017-12-15 | End: 2017-12-15

## 2017-12-14 RX ORDER — LISINOPRIL 20 MG/1
20 TABLET ORAL DAILY
Status: DISCONTINUED | OUTPATIENT
Start: 2017-12-14 | End: 2017-12-15 | Stop reason: ALTCHOICE

## 2017-12-14 RX ORDER — ACETAMINOPHEN 325 MG/1
650 TABLET ORAL EVERY 4 HOURS PRN
Status: DISCONTINUED | OUTPATIENT
Start: 2017-12-14 | End: 2017-12-21 | Stop reason: HOSPADM

## 2017-12-14 RX ORDER — SODIUM CHLORIDE 9 MG/ML
INJECTION, SOLUTION INTRAVENOUS CONTINUOUS
Status: DISCONTINUED | OUTPATIENT
Start: 2017-12-14 | End: 2017-12-15

## 2017-12-14 RX ORDER — HEPARIN SODIUM 1000 [USP'U]/ML
80 INJECTION, SOLUTION INTRAVENOUS; SUBCUTANEOUS PRN
Status: DISCONTINUED | OUTPATIENT
Start: 2017-12-14 | End: 2017-12-18 | Stop reason: ALTCHOICE

## 2017-12-14 RX ORDER — NICOTINE POLACRILEX 4 MG
15 LOZENGE BUCCAL PRN
Status: DISCONTINUED | OUTPATIENT
Start: 2017-12-14 | End: 2017-12-14 | Stop reason: SDUPTHER

## 2017-12-14 RX ORDER — ALPRAZOLAM 0.5 MG/1
1 TABLET ORAL 3 TIMES DAILY PRN
Status: DISCONTINUED | OUTPATIENT
Start: 2017-12-14 | End: 2017-12-16

## 2017-12-14 RX ORDER — NITROGLYCERIN 0.4 MG/1
0.4 TABLET SUBLINGUAL EVERY 5 MIN PRN
Status: DISCONTINUED | OUTPATIENT
Start: 2017-12-14 | End: 2017-12-21 | Stop reason: HOSPADM

## 2017-12-14 RX ORDER — DEXTROSE MONOHYDRATE 25 G/50ML
12.5 INJECTION, SOLUTION INTRAVENOUS PRN
Status: DISCONTINUED | OUTPATIENT
Start: 2017-12-14 | End: 2017-12-21 | Stop reason: HOSPADM

## 2017-12-14 RX ORDER — 0.9 % SODIUM CHLORIDE 0.9 %
600 INTRAVENOUS SOLUTION INTRAVENOUS ONCE
Status: COMPLETED | OUTPATIENT
Start: 2017-12-14 | End: 2017-12-15

## 2017-12-14 RX ORDER — OXYCODONE HYDROCHLORIDE AND ACETAMINOPHEN 5; 325 MG/1; MG/1
1 TABLET ORAL ONCE
Status: COMPLETED | OUTPATIENT
Start: 2017-12-14 | End: 2017-12-14

## 2017-12-14 RX ORDER — SODIUM CHLORIDE 0.9 % (FLUSH) 0.9 %
10 SYRINGE (ML) INJECTION EVERY 12 HOURS SCHEDULED
Status: DISCONTINUED | OUTPATIENT
Start: 2017-12-14 | End: 2017-12-21 | Stop reason: HOSPADM

## 2017-12-14 RX ORDER — SODIUM CHLORIDE 0.9 % (FLUSH) 0.9 %
10 SYRINGE (ML) INJECTION PRN
Status: DISCONTINUED | OUTPATIENT
Start: 2017-12-14 | End: 2017-12-14 | Stop reason: SDUPTHER

## 2017-12-14 RX ORDER — KETOROLAC TROMETHAMINE 30 MG/ML
15 INJECTION, SOLUTION INTRAMUSCULAR; INTRAVENOUS ONCE
Status: COMPLETED | OUTPATIENT
Start: 2017-12-14 | End: 2017-12-14

## 2017-12-14 RX ORDER — PREGABALIN 75 MG/1
75 CAPSULE ORAL EVERY 6 HOURS
Status: DISCONTINUED | OUTPATIENT
Start: 2017-12-14 | End: 2017-12-16

## 2017-12-14 RX ORDER — DEXTROSE MONOHYDRATE 50 MG/ML
100 INJECTION, SOLUTION INTRAVENOUS PRN
Status: DISCONTINUED | OUTPATIENT
Start: 2017-12-14 | End: 2017-12-14 | Stop reason: SDUPTHER

## 2017-12-14 RX ORDER — AZITHROMYCIN 250 MG/1
500 TABLET, FILM COATED ORAL ONCE
Status: COMPLETED | OUTPATIENT
Start: 2017-12-14 | End: 2017-12-14

## 2017-12-14 RX ORDER — NICOTINE POLACRILEX 4 MG
15 LOZENGE BUCCAL PRN
Status: DISCONTINUED | OUTPATIENT
Start: 2017-12-14 | End: 2017-12-21 | Stop reason: HOSPADM

## 2017-12-14 RX ORDER — HEPARIN SODIUM 10000 [USP'U]/100ML
18 INJECTION, SOLUTION INTRAVENOUS CONTINUOUS
Status: DISCONTINUED | OUTPATIENT
Start: 2017-12-15 | End: 2017-12-17

## 2017-12-14 RX ORDER — ONDANSETRON 2 MG/ML
4 INJECTION INTRAMUSCULAR; INTRAVENOUS ONCE
Status: COMPLETED | OUTPATIENT
Start: 2017-12-14 | End: 2017-12-14

## 2017-12-14 RX ORDER — DEXTROSE MONOHYDRATE 25 G/50ML
12.5 INJECTION, SOLUTION INTRAVENOUS PRN
Status: DISCONTINUED | OUTPATIENT
Start: 2017-12-14 | End: 2017-12-14 | Stop reason: RX

## 2017-12-14 RX ORDER — QUETIAPINE 300 MG/1
300 TABLET, FILM COATED, EXTENDED RELEASE ORAL NIGHTLY
Status: DISCONTINUED | OUTPATIENT
Start: 2017-12-14 | End: 2017-12-16

## 2017-12-14 RX ORDER — INSULIN GLARGINE 100 [IU]/ML
28 INJECTION, SOLUTION SUBCUTANEOUS DAILY
Status: DISCONTINUED | OUTPATIENT
Start: 2017-12-14 | End: 2017-12-19

## 2017-12-14 RX ORDER — OXYCODONE AND ACETAMINOPHEN 7.5; 325 MG/1; MG/1
1 TABLET ORAL EVERY 6 HOURS PRN
Status: DISCONTINUED | OUTPATIENT
Start: 2017-12-14 | End: 2017-12-16

## 2017-12-14 RX ADMIN — AZITHROMYCIN 500 MG: 250 TABLET, FILM COATED ORAL at 18:51

## 2017-12-14 RX ADMIN — CEFTRIAXONE 1 G: 1 INJECTION, POWDER, FOR SOLUTION INTRAMUSCULAR; INTRAVENOUS at 18:50

## 2017-12-14 RX ADMIN — SODIUM CHLORIDE 1000 ML: 9 INJECTION, SOLUTION INTRAVENOUS at 12:58

## 2017-12-14 RX ADMIN — SODIUM CHLORIDE 600 ML: 9 INJECTION, SOLUTION INTRAVENOUS at 22:51

## 2017-12-14 RX ADMIN — KETOROLAC TROMETHAMINE 15 MG: 30 INJECTION, SOLUTION INTRAMUSCULAR at 12:58

## 2017-12-14 RX ADMIN — SODIUM CHLORIDE 1000 ML: 9 INJECTION, SOLUTION INTRAVENOUS at 17:58

## 2017-12-14 RX ADMIN — ONDANSETRON 4 MG: 2 INJECTION INTRAMUSCULAR; INTRAVENOUS at 12:58

## 2017-12-14 RX ADMIN — INSULIN HUMAN 6 UNITS: 100 INJECTION, SOLUTION PARENTERAL at 15:50

## 2017-12-14 RX ADMIN — IOPAMIDOL 80 ML: 755 INJECTION, SOLUTION INTRAVENOUS at 14:55

## 2017-12-14 RX ADMIN — OXYCODONE HYDROCHLORIDE AND ACETAMINOPHEN 1 TABLET: 5; 325 TABLET ORAL at 15:24

## 2017-12-14 ASSESSMENT — PAIN DESCRIPTION - DESCRIPTORS: DESCRIPTORS: ACHING;SORE

## 2017-12-14 ASSESSMENT — PAIN DESCRIPTION - PAIN TYPE
TYPE: ACUTE PAIN
TYPE: CHRONIC PAIN
TYPE: ACUTE PAIN

## 2017-12-14 ASSESSMENT — ENCOUNTER SYMPTOMS
EYE PAIN: 0
WHEEZING: 0
ABDOMINAL PAIN: 1
DIARRHEA: 1
RHINORRHEA: 0
NAUSEA: 0
VOMITING: 0
SHORTNESS OF BREATH: 0
EYE DISCHARGE: 0
SORE THROAT: 0
COUGH: 1
BACK PAIN: 0

## 2017-12-14 ASSESSMENT — PAIN DESCRIPTION - LOCATION
LOCATION: ABDOMEN
LOCATION: ABDOMEN
LOCATION_2: BACK
LOCATION: ABDOMEN
LOCATION: ABDOMEN
LOCATION: LEG

## 2017-12-14 ASSESSMENT — PAIN SCALES - GENERAL
PAINLEVEL_OUTOF10: 2
PAINLEVEL_OUTOF10: 6
PAINLEVEL_OUTOF10: 2
PAINLEVEL_OUTOF10: 6
PAINLEVEL_OUTOF10: 5
PAINLEVEL_OUTOF10: 5

## 2017-12-14 ASSESSMENT — PAIN DESCRIPTION - FREQUENCY: FREQUENCY: CONTINUOUS

## 2017-12-14 ASSESSMENT — PAIN DESCRIPTION - INTENSITY
RATING_2: 7
RATING_2: 4
RATING_2: 6

## 2017-12-14 ASSESSMENT — PAIN DESCRIPTION - ORIENTATION
ORIENTATION: RIGHT;LEFT
ORIENTATION: MID

## 2017-12-14 NOTE — CARE COORDINATION
ED Care Transition    2017    Patient Name: Jessica Brandt   : 1960  MRN: [id number]      MILLY Score: 3  PCP: Niki Lara MD   Specialist: yes - Dr. Monica Smiley,   Active ACC/CTC: no    Utilization Review:  ED visits:  4  Admissions: 0    Problem List:  Patient Active Problem List   Diagnosis    Fibromyalgia    RSD (reflex sympathetic dystrophy)    Schizoaffective disorder ([address]    Anxiety    Benign essential HTN    CAD (coronary artery disease)    Leg pain    Peripheral vascular disease ([address]    Neuropathy, diabetic ([address]    Insulin dependent type 2 diabetes mellitus, uncontrolled ([address]    Phantom limb pain ([address]    AAA (abdominal aortic aneurysm) ([address]    Hemiparesis affecting right side as late effect of stroke ([address]    Combined receptive and expressive aphasia due to stroke ([address]    Tobacco abuse    PAD (peripheral artery disease) ([address]    Spondylosis of lumbar region without myelopathy or radiculopathy    Complex regional pain syndrome type 1 of both lower extremities    Phantom limb syndrome with pain ([address]     Housing Review:  Current Housin-story house/ trailer  Who do you live with?   alone       Are you an active caregiver in your home?   no    Medication Review:  Are you able to afford your meds? Yes  Do you take your medications as prescribed? Yes   Do you manage your medications? Yes    Social Support/Self Care:  Who helps you at home?  a good social support network  jail Support:  [address] [employer] [employer], Family assistance and [place] [address] on Aging (Senior Services), Transportation Assistance, [address] [place] [address] and [address]        Active Skilled Services/Treatment:  [facility] [address]:  Do you have any DME?   tub transfer bench and wheel chair   Patient Home Respiratory Equipment no      Summary:  Spoke with Case Ponce, introduced self/role.  Patient presents to ED via EMS with complaint of mid abdominal pain and back pain. Patient very drowsy, speech slow. [address] Sonali Espinoza at [facility]. Received report. States she is sluggish at times. She is very independent at home, manages own medications. Has meals on wheels. Sonali Espinoza visits patient weekly. Has aide 2 hours every day. Also [employer].  Varsha Vega. Receives transportation through [employer]. Sonali Espinoza also stated she visited patient yesterday, patient complained of chest pain, she gave her 2 Nitro with relief. Primary ED RN notified of report from [facility] nurse. Verbalized understanding. Patient denies further needs at home. Will follow ED results/plan.         Follow up appointments:    Future Appointments  Date Time Provider López Timmons   1/16/2018 5:00 PM Tremayne Noel C[facility]   1/31/2018 11:40 AM MD Anabel York 1999       Review Due Health Maintenance:  Health Maintenance Due   Topic Date Due    Hepatitis C screen  1960    HIV screen  11/23/1974    Pneumococcal med risk (1 of 1 - PPSV23) 11/23/1978    Cervical cancer screen  11/23/1980    Breast cancer screen  11/23/2009    Diabetic microalbuminuria test  03/19/2013    Diabetic retinal exam  07/16/2013    Diabetic foot exam  06/24/2015    Lipid screen  08/08/2016    Flu vaccine (1) 09/01/2017    Diabetic hemoglobin A1C test  10/31/2017

## 2017-12-14 NOTE — ED NOTES
Patient resting on cart with eyes closed. Patient arouses to voice. Respirations regular and unlabored. Patient updated on plan of care. Patient complains of abdominal and back pain. Side rails up times 2. Call light in reach. Warm blanket provided. Room lights dimmed.      Esme Arellano RN  12/14/17 8926

## 2017-12-14 NOTE — ED NOTES
Patient presents to ED via EMS with complaint of mid abdominal pain and back pain. EMS states patient had home health nurse at home sent her in. Patient states she is taking Percocet, denies taking any other home medications today. Patient is unable to verify medications with RN at this time, patient stammers during answers, stating her stomach hurts, then stating left leg hurts. Patient is a right leg below knee amputatee. Patient slow to answer questions, alert and oriented. Respirations unlabored and regular. Patient states last bowel movement was yesterday. Patient states she uses a wheelchair at home.      Christian Murray RN  12/14/17 3920

## 2017-12-14 NOTE — ED PROVIDER NOTES
Patient needs NEW METER, STRIPS PLUS LANCETS. TESTING 4 TIMES DAILY    GLUCOSE MONITORING KIT (FREESTYLE) MONITORING KIT    Dispense ALL required supplies to test 4 times daily. Dx: 250.02 Any brand device is acceptable. IBUPROFEN (ADVIL;MOTRIN) 100 MG/5ML SUSPENSION    take 40 milliliters by mouth every 8 hours if needed    INSULIN GLARGINE (LANTUS) 100 UNIT/ML INJECTION PEN    Inject 28 Units into the skin daily. INSULIN LISPRO (HUMALOG) 100 UNIT/ML INJECTION VIAL    Inject 0-12 Units into the skin 3 times daily (before meals). INSULIN PEN NEEDLE 31G X 5 MM MISC    Inject insulin SQ 4 x daily (31Gx 3/16\") 5mm    LISINOPRIL (PRINIVIL;ZESTRIL) 20 MG TABLET    Take 1 tablet by mouth daily    METFORMIN (GLUCOPHAGE) 500 MG TABLET    Take 1 tablet by mouth 2 times daily (with meals)    METOPROLOL TARTRATE (LOPRESSOR) 25 MG TABLET    Take 1 tablet by mouth 2 times daily    NAPROXEN (NAPROSYN) 500 MG TABLET    Take 1 tablet by mouth 2 times daily (with meals) for 14 days    NITROGLYCERIN (NITROSTAT) 0.4 MG SL TABLET    Place 1 tablet under the tongue every 5 minutes as needed for Chest pain. OXYCODONE-ACETAMINOPHEN (PERCOCET) 7.5-325 MG PER TABLET    Take 1 tablet by mouth every 6 hours as needed for Pain . PREGABALIN (LYRICA) 75 MG CAPSULE    Take 1 capsule by mouth every 6 hours . SEROQUEL  MG XR TABLET    take 1 tablet by mouth every evening       ALLERGIES     is allergic to latex; lorazepam; gabapentin; wellbutrin [bupropion]; and tape [adhesive tape]. FAMILY HISTORY     indicated that her mother is . She indicated that her father is . She indicated that the status of her maternal grandmother is unknown.    family history includes Cancer in her maternal grandmother and mother; Diabetes in her mother; Heart Disease in her father; Stroke in her mother. SOCIAL HISTORY      reports that she has been smoking Cigarettes. She has a 17.00 pack-year smoking history.  She has never used smokeless tobacco. She reports that she does not drink alcohol or use drugs. PHYSICAL EXAM     INITIAL VITALS:  height is 5' 7\" (1.702 m) and weight is 170 lb (77.1 kg). Her oral temperature is 98.7 °F (37.1 °C). Her blood pressure is 82/62 and her pulse is 89. Her respiration is 16 and oxygen saturation is 93%. Physical Exam   Constitutional: She is oriented to person, place, and time. Vital signs are normal. She appears well-developed and well-nourished. Non-toxic appearance. No distress. HENT:   Head: Normocephalic and atraumatic. Right Ear: Hearing normal.   Left Ear: Hearing normal.   Nose: Nose normal. No rhinorrhea. Mouth/Throat: Uvula is midline, oropharynx is clear and moist and mucous membranes are normal.   Eyes: Conjunctivae and EOM are normal. Pupils are equal, round, and reactive to light. No scleral icterus. Neck: No JVD present. No neck rigidity. No tracheal deviation present. Cardiovascular: Normal rate, regular rhythm and normal heart sounds. Pulmonary/Chest: Effort normal and breath sounds normal. No respiratory distress. She has no decreased breath sounds. She has no wheezes. Abdominal: Soft. Normal appearance. She exhibits no distension and no pulsatile midline mass. Bowel sounds are increased. There is tenderness (diffuse, but none on distracted exam). There is no rigidity, no rebound, no guarding, no CVA tenderness, no tenderness at McBurney's point and negative Dejesus's sign. No hernia. Musculoskeletal: Normal range of motion. Lymphadenopathy:     She has no cervical adenopathy. Neurological: She is alert and oriented to person, place, and time. She has normal strength. Gait normal.   Skin: Skin is warm, dry and intact. No rash noted. She is not diaphoretic. No pallor. Psychiatric: She has a normal mood and affect. Her speech is normal and behavior is normal. Thought content normal.   Nursing note and vitals reviewed.       DIFFERENTIAL DIAGNOSIS:   Including following:        Result Value    WBC 14.9 (*)     MCH 31.1 (*)     Segs Absolute 13.1 (*)     Lymphocytes # 0.9 (*)     All other components within normal limits   COMPREHENSIVE METABOLIC PANEL - Abnormal; Notable for the following:     Glucose 342 (*)     Chloride 96 (*)     ALT 7 (*)     All other components within normal limits   GLOMERULAR FILTRATION RATE, ESTIMATED - Abnormal; Notable for the following:     Est, Glom Filt Rate 74 (*)     All other components within normal limits   TROPONIN - Abnormal; Notable for the following:     Troponin T 0.020 (*)     All other components within normal limits   URINE WITH REFLEXED MICRO - Abnormal; Notable for the following:     Glucose, Ur >= 1000 (*)     Bilirubin Urine SMALL (*)     Ketones, Urine TRACE (*)     Specific Gravity, Urine > 1.030 (*)     Blood, Urine LARGE (*)     Protein,  (*)     Character, Urine CLOUDY (*)     All other components within normal limits   POCT GLUCOSE - Abnormal; Notable for the following:     POC Glucose 288 (*)     All other components within normal limits   POCT GLUCOSE - Abnormal; Notable for the following:     POC Glucose 209 (*)     All other components within normal limits   POCT GLUCOSE - Normal   URINE CULTURE, REFLEXED    Narrative:     Source: urine, clean catch       Site: clean void          Current Antibiotics: not stated   CULTURE BLOOD #1   CULTURE BLOOD #2   LIPASE   LACTIC ACID, PLASMA   ANION GAP   OSMOLALITY   BILE ACIDS, TOTAL   LACTIC ACID, PLASMA   POCT GLUCOSE       EMERGENCY DEPARTMENT COURSE:   Vitals:    Vitals:    12/14/17 1221 12/14/17 1414 12/14/17 1505 12/14/17 1642   BP: 127/89 120/79 (!) 103/46 82/62   Pulse: 98 93 89 89   Resp: 18 16 16 16   Temp: 98.7 °F (37.1 °C)      TempSrc: Oral      SpO2: (!) 89% 93% 93% 93%   Weight: 170 lb (77.1 kg)      Height: 5' 7\" (1.702 m)          Patient was evaluated 3 days ago for a spinal cord stimulator.  She has had a prior stroke and has mixed expressive and note were completed with a voice recognition program.  Efforts were made to edit the dictations but occasionally words are mis-transcribed.)    Scribe:  Chai Whaley 12/14/17 12:39 PM Scribing for and in the presence of Dora Ruff PA-C. Signed by: Jumana Rios, 12/14/17 5:49 PM    Provider:  I personally performed the services described in the documentation, reviewed and edited the documentation which was dictated to the scribe in my presence, and it accurately records my words and actions.     Dora Ruff PA-C 12/14/17 5:49 PM    NATALI Rey, [place]  12/14/17 4955

## 2017-12-15 ENCOUNTER — APPOINTMENT (OUTPATIENT)
Dept: CT IMAGING | Age: 57
DRG: 193 | End: 2017-12-15
Payer: MEDICARE

## 2017-12-15 LAB
ALLEN TEST: ABNORMAL
ANION GAP SERPL CALCULATED.3IONS-SCNC: 12 MEQ/L (ref 8–16)
APTT: 59.6 SECONDS (ref 22–38)
APTT: 61.2 SECONDS (ref 22–38)
APTT: 78.8 SECONDS (ref 22–38)
AVERAGE GLUCOSE: 192 MG/DL (ref 70–126)
BASE EXCESS (CALCULATED): 2.8 MMOL/L (ref -2.5–2.5)
BASE EXCESS (CALCULATED): 3.1 MMOL/L (ref -2.5–2.5)
BASE EXCESS (CALCULATED): 3.2 MMOL/L (ref -2.5–2.5)
BASOPHILS # BLD: 0 %
BASOPHILS ABSOLUTE: 0 THOU/MM3 (ref 0–0.1)
BUN BLDV-MCNC: 17 MG/DL (ref 7–22)
CALCIUM SERPL-MCNC: 8.4 MG/DL (ref 8.5–10.5)
CHLORIDE BLD-SCNC: 103 MEQ/L (ref 98–111)
CO2: 25 MEQ/L (ref 23–33)
COLLECTED BY:: ABNORMAL
CREAT SERPL-MCNC: 0.9 MG/DL (ref 0.4–1.2)
DEVICE: ABNORMAL
EKG ATRIAL RATE: 74 BPM
EKG ATRIAL RATE: 82 BPM
EKG P AXIS: 48 DEGREES
EKG P AXIS: 55 DEGREES
EKG P-R INTERVAL: 162 MS
EKG P-R INTERVAL: 162 MS
EKG Q-T INTERVAL: 396 MS
EKG Q-T INTERVAL: 408 MS
EKG QRS DURATION: 90 MS
EKG QRS DURATION: 92 MS
EKG QTC CALCULATION (BAZETT): 452 MS
EKG QTC CALCULATION (BAZETT): 462 MS
EKG R AXIS: 62 DEGREES
EKG R AXIS: 70 DEGREES
EKG T AXIS: 114 DEGREES
EKG T AXIS: 131 DEGREES
EKG VENTRICULAR RATE: 74 BPM
EKG VENTRICULAR RATE: 82 BPM
EOSINOPHIL # BLD: 0.3 %
EOSINOPHILS ABSOLUTE: 0 THOU/MM3 (ref 0–0.4)
FLU A ANTIGEN: NEGATIVE
FLU B ANTIGEN: NEGATIVE
GFR SERPL CREATININE-BSD FRML MDRD: 64 ML/MIN/1.73M2
GLUCOSE BLD-MCNC: 223 MG/DL (ref 70–108)
GLUCOSE BLD-MCNC: 252 MG/DL (ref 70–108)
GLUCOSE BLD-MCNC: 288 MG/DL (ref 70–108)
GLUCOSE BLD-MCNC: 295 MG/DL (ref 70–108)
GLUCOSE BLD-MCNC: 295 MG/DL (ref 70–108)
GLUCOSE BLD-MCNC: 302 MG/DL (ref 70–108)
GLUCOSE BLD-MCNC: 312 MG/DL (ref 70–108)
GLUCOSE BLD-MCNC: 379 MG/DL (ref 70–108)
HBA1C MFR BLD: 8.4 % (ref 4.4–6.4)
HCO3: 29 MMOL/L (ref 23–28)
HCO3: 30 MMOL/L (ref 23–28)
HCO3: 31 MMOL/L (ref 23–28)
HCT VFR BLD CALC: 38.6 % (ref 37–47)
HEMOGLOBIN: 12.5 GM/DL (ref 12–16)
IFIO2: 4
IFIO2: 40
IFIO2: 40
LD: 314 U/L (ref 100–190)
LV EF: 58 %
LVEF MODALITY: NORMAL
LYMPHOCYTES # BLD: 7 %
LYMPHOCYTES ABSOLUTE: 1 THOU/MM3 (ref 1–4.8)
MCH RBC QN AUTO: 31.2 PG (ref 27–31)
MCHC RBC AUTO-ENTMCNC: 32.4 GM/DL (ref 33–37)
MCV RBC AUTO: 96.5 FL (ref 81–99)
MODE: ABNORMAL
MODE: ABNORMAL
MONOCYTES # BLD: 6 %
MONOCYTES ABSOLUTE: 0.8 THOU/MM3 (ref 0.4–1.3)
MRSA SCREEN RT-PCR: NEGATIVE
NUCLEATED RED BLOOD CELLS: 0 /100 WBC
O2 SATURATION: 88 %
O2 SATURATION: 93 %
O2 SATURATION: 94 %
PCO2: 50 MMHG (ref 35–45)
PCO2: 56 MMHG (ref 35–45)
PCO2: 61 MMHG (ref 35–45)
PDW BLD-RTO: 13.9 % (ref 11.5–14.5)
PH BLOOD GAS: 7.31 (ref 7.35–7.45)
PH BLOOD GAS: 7.34 (ref 7.35–7.45)
PH BLOOD GAS: 7.37 (ref 7.35–7.45)
PLATELET # BLD: 151 THOU/MM3 (ref 130–400)
PMV BLD AUTO: 9.7 MCM (ref 7.4–10.4)
PO2: 61 MMHG (ref 71–104)
PO2: 70 MMHG (ref 71–104)
PO2: 78 MMHG (ref 71–104)
POTASSIUM SERPL-SCNC: 4.5 MEQ/L (ref 3.5–5.2)
PROCALCITONIN: 0.64 NG/ML (ref 0.01–0.09)
RBC # BLD: 4 MILL/MM3 (ref 4.2–5.4)
SEG NEUTROPHILS: 86.7 %
SEGMENTED NEUTROPHILS ABSOLUTE COUNT: 12 THOU/MM3 (ref 1.8–7.7)
SET PEEP: 6 MMHG
SET PEEP: 8 MMHG
SET PRESS SUPP: 12 CMH2O
SET PRESS SUPP: 6 CMH2O
SET RESPIRATORY RATE: 18 BPM
SODIUM BLD-SCNC: 140 MEQ/L (ref 135–145)
SOURCE, BLOOD GAS: ABNORMAL
TOTAL PROTEIN: 6.2 G/DL (ref 6.1–8)
TROPONIN T: 0.01 NG/ML
TROPONIN T: 0.02 NG/ML
WBC # BLD: 13.8 THOU/MM3 (ref 4.8–10.8)

## 2017-12-15 PROCEDURE — 6370000000 HC RX 637 (ALT 250 FOR IP): Performed by: NURSE PRACTITIONER

## 2017-12-15 PROCEDURE — 99223 1ST HOSP IP/OBS HIGH 75: CPT | Performed by: INTERNAL MEDICINE

## 2017-12-15 PROCEDURE — 2580000003 HC RX 258: Performed by: FAMILY MEDICINE

## 2017-12-15 PROCEDURE — 84155 ASSAY OF PROTEIN SERUM: CPT

## 2017-12-15 PROCEDURE — 6370000000 HC RX 637 (ALT 250 FOR IP): Performed by: INTERNAL MEDICINE

## 2017-12-15 PROCEDURE — 87205 SMEAR GRAM STAIN: CPT

## 2017-12-15 PROCEDURE — 87899 AGENT NOS ASSAY W/OPTIC: CPT

## 2017-12-15 PROCEDURE — 87449 NOS EACH ORGANISM AG IA: CPT

## 2017-12-15 PROCEDURE — 94640 AIRWAY INHALATION TREATMENT: CPT

## 2017-12-15 PROCEDURE — 6370000000 HC RX 637 (ALT 250 FOR IP): Performed by: FAMILY MEDICINE

## 2017-12-15 PROCEDURE — 83986 ASSAY PH BODY FLUID NOS: CPT

## 2017-12-15 PROCEDURE — 99233 SBSQ HOSP IP/OBS HIGH 50: CPT | Performed by: INTERNAL MEDICINE

## 2017-12-15 PROCEDURE — 85730 THROMBOPLASTIN TIME PARTIAL: CPT

## 2017-12-15 PROCEDURE — 84484 ASSAY OF TROPONIN QUANT: CPT

## 2017-12-15 PROCEDURE — 84157 ASSAY OF PROTEIN OTHER: CPT

## 2017-12-15 PROCEDURE — 36415 COLL VENOUS BLD VENIPUNCTURE: CPT

## 2017-12-15 PROCEDURE — 6360000002 HC RX W HCPCS: Performed by: FAMILY MEDICINE

## 2017-12-15 PROCEDURE — 85025 COMPLETE CBC W/AUTO DIFF WBC: CPT

## 2017-12-15 PROCEDURE — 92610 EVALUATE SWALLOWING FUNCTION: CPT

## 2017-12-15 PROCEDURE — 6360000004 HC RX CONTRAST MEDICATION: Performed by: FAMILY MEDICINE

## 2017-12-15 PROCEDURE — 82945 GLUCOSE OTHER FLUID: CPT

## 2017-12-15 PROCEDURE — B32T1ZZ COMPUTERIZED TOMOGRAPHY (CT SCAN) OF LEFT PULMONARY ARTERY USING LOW OSMOLAR CONTRAST: ICD-10-PCS | Performed by: RADIOLOGY

## 2017-12-15 PROCEDURE — 82803 BLOOD GASES ANY COMBINATION: CPT

## 2017-12-15 PROCEDURE — 83615 LACTATE (LD) (LDH) ENZYME: CPT

## 2017-12-15 PROCEDURE — 71275 CT ANGIOGRAPHY CHEST: CPT

## 2017-12-15 PROCEDURE — 2140000000 HC CCU INTERMEDIATE R&B

## 2017-12-15 PROCEDURE — 80048 BASIC METABOLIC PNL TOTAL CA: CPT

## 2017-12-15 PROCEDURE — 82948 REAGENT STRIP/BLOOD GLUCOSE: CPT

## 2017-12-15 PROCEDURE — 2700000000 HC OXYGEN THERAPY PER DAY

## 2017-12-15 PROCEDURE — 36600 WITHDRAWAL OF ARTERIAL BLOOD: CPT

## 2017-12-15 PROCEDURE — 87804 INFLUENZA ASSAY W/OPTIC: CPT

## 2017-12-15 PROCEDURE — B32S1ZZ COMPUTERIZED TOMOGRAPHY (CT SCAN) OF RIGHT PULMONARY ARTERY USING LOW OSMOLAR CONTRAST: ICD-10-PCS | Performed by: RADIOLOGY

## 2017-12-15 PROCEDURE — 94660 CPAP INITIATION&MGMT: CPT

## 2017-12-15 PROCEDURE — APPSS180 APP SPLIT SHARED TIME > 60 MINUTES: Performed by: NURSE PRACTITIONER

## 2017-12-15 PROCEDURE — 93306 TTE W/DOPPLER COMPLETE: CPT

## 2017-12-15 RX ORDER — AZITHROMYCIN 250 MG/1
500 TABLET, FILM COATED ORAL EVERY 24 HOURS
Status: DISCONTINUED | OUTPATIENT
Start: 2017-12-15 | End: 2017-12-15

## 2017-12-15 RX ORDER — ASPIRIN 81 MG/1
81 TABLET ORAL DAILY
Status: DISCONTINUED | OUTPATIENT
Start: 2017-12-15 | End: 2017-12-21 | Stop reason: HOSPADM

## 2017-12-15 RX ORDER — AZITHROMYCIN 250 MG/1
250 TABLET, FILM COATED ORAL EVERY 24 HOURS
Status: DISCONTINUED | OUTPATIENT
Start: 2017-12-15 | End: 2017-12-15

## 2017-12-15 RX ORDER — VANCOMYCIN HYDROCHLORIDE 500 MG/100ML
500 INJECTION, SOLUTION INTRAVENOUS
Status: DISCONTINUED | OUTPATIENT
Start: 2017-12-15 | End: 2017-12-17 | Stop reason: DRUGHIGH

## 2017-12-15 RX ORDER — IPRATROPIUM BROMIDE AND ALBUTEROL SULFATE 2.5; .5 MG/3ML; MG/3ML
1 SOLUTION RESPIRATORY (INHALATION)
Status: DISCONTINUED | OUTPATIENT
Start: 2017-12-15 | End: 2017-12-21 | Stop reason: HOSPADM

## 2017-12-15 RX ORDER — VANCOMYCIN HYDROCHLORIDE 1 G/200ML
1000 INJECTION, SOLUTION INTRAVENOUS
Status: DISCONTINUED | OUTPATIENT
Start: 2017-12-15 | End: 2017-12-15 | Stop reason: SDUPTHER

## 2017-12-15 RX ORDER — VANCOMYCIN HYDROCHLORIDE 1 G/200ML
1000 INJECTION, SOLUTION INTRAVENOUS
Status: DISCONTINUED | OUTPATIENT
Start: 2017-12-15 | End: 2017-12-17 | Stop reason: DRUGHIGH

## 2017-12-15 RX ORDER — CLOPIDOGREL BISULFATE 75 MG/1
75 TABLET ORAL DAILY
Status: DISCONTINUED | OUTPATIENT
Start: 2017-12-15 | End: 2017-12-19

## 2017-12-15 RX ORDER — AZITHROMYCIN 250 MG/1
250 TABLET, FILM COATED ORAL EVERY 24 HOURS
Status: COMPLETED | OUTPATIENT
Start: 2017-12-15 | End: 2017-12-18

## 2017-12-15 RX ORDER — ATORVASTATIN CALCIUM 40 MG/1
40 TABLET, FILM COATED ORAL NIGHTLY
Status: DISCONTINUED | OUTPATIENT
Start: 2017-12-15 | End: 2017-12-21 | Stop reason: HOSPADM

## 2017-12-15 RX ADMIN — ACETAMINOPHEN 650 MG: 325 TABLET ORAL at 04:43

## 2017-12-15 RX ADMIN — ASPIRIN 81 MG: 81 TABLET, COATED ORAL at 12:53

## 2017-12-15 RX ADMIN — VANCOMYCIN HYDROCHLORIDE 500 MG: 500 INJECTION, SOLUTION INTRAVENOUS at 09:14

## 2017-12-15 RX ADMIN — VANCOMYCIN HYDROCHLORIDE 500 MG: 500 INJECTION, SOLUTION INTRAVENOUS at 23:19

## 2017-12-15 RX ADMIN — IOPAMIDOL 80 ML: 755 INJECTION, SOLUTION INTRAVENOUS at 01:39

## 2017-12-15 RX ADMIN — PREGABALIN 75 MG: 75 CAPSULE ORAL at 12:58

## 2017-12-15 RX ADMIN — INSULIN LISPRO 4 UNITS: 100 INJECTION, SOLUTION INTRAVENOUS; SUBCUTANEOUS at 18:05

## 2017-12-15 RX ADMIN — PREGABALIN 75 MG: 75 CAPSULE ORAL at 22:18

## 2017-12-15 RX ADMIN — VANCOMYCIN HYDROCHLORIDE 1000 MG: 1 INJECTION, SOLUTION INTRAVENOUS at 07:28

## 2017-12-15 RX ADMIN — VANCOMYCIN HYDROCHLORIDE 1000 MG: 1 INJECTION, SOLUTION INTRAVENOUS at 22:16

## 2017-12-15 RX ADMIN — CLOPIDOGREL 75 MG: 75 TABLET, FILM COATED ORAL at 12:52

## 2017-12-15 RX ADMIN — IPRATROPIUM BROMIDE AND ALBUTEROL SULFATE 1 AMPULE: .5; 3 SOLUTION RESPIRATORY (INHALATION) at 12:18

## 2017-12-15 RX ADMIN — FAMOTIDINE 20 MG: 20 TABLET, FILM COATED ORAL at 12:53

## 2017-12-15 RX ADMIN — QUETIAPINE FUMARATE 300 MG: 300 TABLET, FILM COATED, EXTENDED RELEASE ORAL at 22:12

## 2017-12-15 RX ADMIN — METOPROLOL TARTRATE 25 MG: 25 TABLET ORAL at 12:53

## 2017-12-15 RX ADMIN — DULOXETINE HYDROCHLORIDE 30 MG: 30 CAPSULE, DELAYED RELEASE ORAL at 12:53

## 2017-12-15 RX ADMIN — HEPARIN SODIUM 6200 UNITS: 1000 INJECTION, SOLUTION INTRAVENOUS; SUBCUTANEOUS at 01:16

## 2017-12-15 RX ADMIN — FAMOTIDINE 20 MG: 20 TABLET, FILM COATED ORAL at 22:12

## 2017-12-15 RX ADMIN — AZITHROMYCIN 250 MG: 250 TABLET, FILM COATED ORAL at 18:04

## 2017-12-15 RX ADMIN — IPRATROPIUM BROMIDE AND ALBUTEROL SULFATE 1 AMPULE: .5; 3 SOLUTION RESPIRATORY (INHALATION) at 20:49

## 2017-12-15 RX ADMIN — INSULIN GLARGINE 28 UNITS: 100 INJECTION, SOLUTION SUBCUTANEOUS at 12:50

## 2017-12-15 RX ADMIN — PREGABALIN 75 MG: 75 CAPSULE ORAL at 18:09

## 2017-12-15 RX ADMIN — INSULIN GLARGINE 28 UNITS: 100 INJECTION, SOLUTION SUBCUTANEOUS at 00:41

## 2017-12-15 RX ADMIN — Medication 5 UNITS: at 22:13

## 2017-12-15 RX ADMIN — ATORVASTATIN CALCIUM 40 MG: 40 TABLET, FILM COATED ORAL at 22:12

## 2017-12-15 RX ADMIN — CEFTRIAXONE SODIUM 1 G: 10 INJECTION, POWDER, FOR SOLUTION INTRAVENOUS at 18:12

## 2017-12-15 RX ADMIN — OXYCODONE HYDROCHLORIDE AND ACETAMINOPHEN 1 TABLET: 7.5; 325 TABLET ORAL at 12:58

## 2017-12-15 RX ADMIN — ALPRAZOLAM 1 MG: 0.5 TABLET ORAL at 22:13

## 2017-12-15 RX ADMIN — METOPROLOL TARTRATE 25 MG: 25 TABLET ORAL at 22:12

## 2017-12-15 RX ADMIN — Medication 10 ML: at 00:44

## 2017-12-15 RX ADMIN — INSULIN LISPRO 6 UNITS: 100 INJECTION, SOLUTION INTRAVENOUS; SUBCUTANEOUS at 12:48

## 2017-12-15 RX ADMIN — OXYCODONE HYDROCHLORIDE AND ACETAMINOPHEN 1 TABLET: 7.5; 325 TABLET ORAL at 23:32

## 2017-12-15 RX ADMIN — HEPARIN SODIUM AND DEXTROSE 18 UNITS/KG/HR: 10000; 5 INJECTION INTRAVENOUS at 01:17

## 2017-12-15 ASSESSMENT — PAIN SCALES - GENERAL
PAINLEVEL_OUTOF10: 0
PAINLEVEL_OUTOF10: 8
PAINLEVEL_OUTOF10: 0
PAINLEVEL_OUTOF10: 2
PAINLEVEL_OUTOF10: 6
PAINLEVEL_OUTOF10: 0

## 2017-12-15 ASSESSMENT — PAIN DESCRIPTION - LOCATION
LOCATION: ABDOMEN;BACK
LOCATION: ABDOMEN;BACK

## 2017-12-15 ASSESSMENT — PAIN DESCRIPTION - DESCRIPTORS
DESCRIPTORS: ACHING
DESCRIPTORS: ACHING

## 2017-12-15 ASSESSMENT — PAIN DESCRIPTION - PAIN TYPE
TYPE: CHRONIC PAIN
TYPE: CHRONIC PAIN

## 2017-12-15 ASSESSMENT — PAIN DESCRIPTION - FREQUENCY: FREQUENCY: CONTINUOUS

## 2017-12-15 NOTE — PROGRESS NOTES
1568: Patient to floor via bed. Patient very lethargic. Patient answers questions and responds but falls back asleep quickly. 46: Dr. La Nena Churchill called regarding patient's temperature and lethargy. Updated her about ABG results. She told the nurse to start BIPAP on patient and get ABGs 2 hours after BIPAP on. She also told the nurse to consult pulmonology. Told her that the consult is in and will call. Orders placed and respiratory called. 0500: Heparin running and vancomycin to be started. Many attempts at starting IV made and unsuccessful. IV therapy to be called. 0515: BIPAP placed on patient. Tolerating well. ABGs to be drawn in two hours. [id number] Better called regarding pulmonology consult. Updated about diagnosis, scans, ABG, and BIPAP. She told the nurse to add the patient to Dr. Leyda Xie list.  0055: IV therapy called and left message to start an IV.  0610: daughter, Cari Cota called regarding patient being transferred to .

## 2017-12-15 NOTE — PLAN OF CARE
Problem: Falls - Risk of  Goal: Absence of falls  Outcome: Ongoing  Patient free of falls. Call light within reach. Bed in lowest position. Nonskid socks on. Bed alarm on. Patient able to turn self. Hourly rounding continues. Problem: Risk for Impaired Skin Integrity  Goal: Tissue integrity - skin and mucous membranes  Structural intactness and normal physiological function of skin and  mucous membranes. Outcome: Ongoing  Patient has unstageable pressure wound on right buttocks. Patient being turned every two hours. Will continue to monitor. Problem: Pain:  Goal: Pain level will decrease  Pain level will decrease   Outcome: Ongoing  Patient denies any pain during this shift. Will continue to monitor. Problem: Discharge Planning:  Goal: Discharged to appropriate level of care  Discharged to appropriate level of care   Outcome: Ongoing  Patient plans to be discharged home alone with home health. Will continue to monitor for additional discharge needs. Problem: Anxiety/Stress:  Goal: Level of anxiety will decrease  Level of anxiety will decrease   Outcome: Ongoing  Patient anxious when mask first put on. Patient able to relax and BIPAP remains on. Will continue to monitor. Problem: Aspiration:  Goal: Absence of aspiration  Absence of aspiration   Outcome: Ongoing  Patient to have swallow eval performed today. Patient choked on water. Able to swallow tylenol with applesauce. Problem: Cardiac Output - Decreased:  Goal: Hemodynamic stability will improve  Hemodynamic stability will improve   Outcome: Ongoing  Patient's vitals stable. Patient remains in normal sinus. Heparin gtt running. BIPAP remains on due to patient being acidotic. Patient received tylenol due to fever. Will continue to monitor with cardiac telemetry.     Problem: Gas Exchange - Impaired:  Goal: Levels of oxygenation will improve  Levels of oxygenation will improve   Outcome: Ongoing  Patient remains above 90% on 4L NC and BIPAP machine. Will continue to monitor. Problem: Serum Glucose Level - Abnormal:  Goal: Ability to maintain appropriate glucose levels will improve to within specified parameters  Ability to maintain appropriate glucose levels will improve to within specified parameters   Outcome: Ongoing  Patient's blood glucose 295. Patient received 28 units of lantus. Sliding scale ordered. Will continue to monitor ACHS and treat accordingly. Comments: Care plan reviewed with patient. Patient verbalized understanding of the plan of care and contribute to goal setting.

## 2017-12-15 NOTE — CARE COORDINATION
12/15/17, 3:05 PM  Blas Polanco       Admitted from: ED 2017/ 1212 Hospital day: 1   Location: [facility] Reason for admit: PNA (pneumonia) [J18.9] Status: inpt  Admit order signed?: yes  PMH:  has a past medical history of Anxiety; Arthritis; Atherosclerotic PVD with ulceration: Left foot ulcer; CAD (coronary artery disease); Chest pressure; Chronic back pain; Chronic pain; Depression; Fibromyalgia; Hyperlipidemia; Hypertension; Mild mitral regurgitation; Neuropathy ([facility] Onychomycosis; RSD (reflex sympathetic dystrophy); S/P CAB2012; Schizoaffective disorder ([facility] Stroke [facility]); and Type II or unspecified type diabetes mellitus without mention of complication, not stated as uncontrolled. Medications:  Scheduled Meds:   vancomycin (VANCOCIN) intermittent dosing (placeholder)   Other RX Placeholder    vancomycin  500 mg Intravenous Q18H    vancomycin  1,000 mg Intravenous Q18H    ipratropium-albuterol  1 ampule Inhalation Q4H WA    aspirin  81 mg Oral Daily    clopidogrel  75 mg Oral Daily    atorvastatin  40 mg Oral Nightly    azithromycin  250 mg Oral Q24H    insulin glargine  28 Units Subcutaneous Daily    metoprolol tartrate  25 mg Oral BID    pregabalin  75 mg Oral Q6H    QUEtiapine  300 mg Oral Nightly    cefTRIAXone (ROCEPHIN) IV  1 g Intravenous Q24H    insulin lispro  0-12 Units Subcutaneous TID WC    insulin lispro  0-6 Units Subcutaneous Nightly    sodium chloride flush  10 mL Intravenous 2 times per day    famotidine  20 mg Oral BID    DULoxetine  30 mg Oral Daily     Continuous Infusions:   dextrose 5 % and 0.9 % NaCl      heparin (porcine) 18 Units/kg/hr (12/15/17 0117)      Pertinent Info/Orders/Treatment Plan: Pt admitted for SOB, pneumonia, on heparin gtt, continuous bi-pap, dysphagia diet.   Diet: DIET DYSPHAGIA II MECHANICALLY ALTERED;  Dietary Nutrition Supplements: Diabetic Oral Supplement   Vital Signs: /63   Pulse 73   Temp 99.5 °F (37.5 °C)

## 2017-12-15 NOTE — CONSULTS
Inpatient consult to Cardiology  Consult performed by: Andrew Thomas ordered by: Jayce Rushing        PNA  Multiloculated mod Left Pleural effusion  Hypoxia  Elevated Troponin due to above TYpe II MI versus Type I  UTI lower  HTN  CAD s/p CABG  DM II  PAD  BKA on the RT  Schizoaffective D/O         No acute pulmonary embolism. Moderate multiloculated left pleural effusion. Bilateral multilobar atelectasis. Component of pneumonia in the left lower lobe not excluded. Probable severe stenosis of the origin of the left common carotid artery.    Additional findings as detailed above     Plan  Treat for NSTEMI  Cont heparin  Serial troponin  Functional study once PNA stablized  [id number]    Royal Crockett MD

## 2017-12-15 NOTE — CONSULTS
loculated left pleural  effusion and bilateral multilobar atelectasis, a component of pneumonia on  the left lower lobe cannot be excluded, possible severe stress of the  origin of left common carotid artery has been noted and so patient is  admitted with diagnostic impression of pneumonia, with hypoxia and further  evaluation revealed her ABG blood gas on admission, pH of 7.31, PCO2 of 61  and PO2 of 61 as well and saturation was 88. So this patient has  hypercapnic respiratory failure and so in view of that the patient was  admitted and troponin was noted to be elevated with nonspecific EKG change  and no chest pain and so Cardiology evaluation was sought in that regard. PAST MEDICAL HISTORY:  Include the following:  Diabetes type 1, history of  stroke in 2012, schizoaffective disorder, coronary artery three-vessel  bypass in 06/2012, reflex sympathetic dystrophy, neuropathy, hypertension,  hyperlipidemia,fibromyalgia, depression, chronic back pain, associated  coronary artery disease, below-knee amputation a few years ago, peripheral  artery disease with below-knee amputation after gangrene per the  information from the patient. PAST SURGICAL HISTORY:  She has a below-knee amputation in 2014, lumbar  spine surgery, lipoma resection, hysterectomy, foot surgery, coronary  artery bypass 2012 and cholecystectomy. FAMILY HISTORY:  At this level shows a positive family history of diabetes  and coronary artery disease. SOCIAL HISTORY:  She is a current smoker. She smoked half a pack a day for  34 years. No alcohol. No drug use. HOME MEDICATIONS:  Prior to admission include, alprazolam, aspirin,  diclofenac, Cymbalta, ibuprofen, Lantus, Humalog, lisinopril, Glucophage,  Lopressor, Naproxen, Nitrostat, Percocet, Lyrica and Seroquel. ALLERGIES:  She is allergic to LATEX, GABAPENTIN, TAPE and WELLBUTRIN. REVIEW OF SYSTEMS:  Negative except the above-mentioned ones.     PHYSICAL EXAMINATION:  GENERAL: care of this patient. I  will follow up with you. Susan Valadez.  Lina Rivas M.D.    D: 12/15/2017 9:17:11       T: 12/15/2017 11:42:51     [email or web address]  Job#: [id number]     Doc#: [id number]    CC:

## 2017-12-15 NOTE — PROGRESS NOTES
Transferred patient 3B. Patient stable at this time.      Vitals:    12/14/17 2010 12/14/17 2328 12/15/17 0312 12/15/17 0431   BP: 105/62 131/66 135/75 124/79   Pulse: 72 72 85 82   Resp: 16 18 18 18   Temp: 97.4 °F (36.3 °C) 97.5 °F (36.4 °C) 97.7 °F (36.5 °C) 100.1 °F (37.8 °C)   TempSrc: Oral Oral Oral Oral   SpO2: 93% 93% 91% 95%   Weight: 182 lb 11.2 oz (82.9 kg)   179 lb 4.8 oz (81.3 kg)   Height: 5' 5\" (1.651 m)

## 2017-12-15 NOTE — CONSULTS
[facility]    Patient - Max Davey   MRN -  [id number]   [facility]   - 1960      Date of Admission -  2017 12:12 PM  Date of evaluation -  12/15/2017  Room - 3B--A   [address] [place] [address] MD Amrik Primary Care Physician - Francesca Hull MD   Chief Complaint   ? Lung mass; pneumonia. Active Hospital Problem List      Active Hospital Problems    Diagnosis Date Noted    PNA (pneumonia) [J18.9] 2017    Acute cystitis [N30.00] 2017    Pleural mass [J94.9] 2017    Elevated troponin [R74.8] 2017    Hypoxia [R09.02] 2017    Complex regional pain syndrome type 1 of both lower extremities [G90.523]     Tobacco abuse [Z72.0] 2016    Insulin dependent type 2 diabetes mellitus, uncontrolled ([facility] [address] [E11.65, Z79.4] 2015    Peripheral vascular disease ([facility] [address] [I73.9]     CAD (coronary artery disease) [I25.10] 10/29/2014    Benign essential HTN [I10] 10/29/2014    Schizoaffective disorder ([facility] [address] [F25.9] 2014     CHANEL Davey is a 61 y.o. female admitted for abd and back pain;   Pt lives at home alone and has [facility]; she states that she had a 1 day hx of abd and mid back pain; pt states HH RN gave her a NTG SL for c/o chest pain and EMS was called; pt does have chronic back pain and follows with Dr Jas Stewart; pt has a hx of CVA with aphasia so a through hx is difficult to obtain from pt and no family at bedside; she states she smokes 1 PPD; denies any home use of 02.     She states she has had a non productive cough \"off and on\"; she states she has had some shortness of breath; denies any previous hx of lung problems; she was hypoxic at 89% on arrival to ED; she did dev a temp of 100.1 at 0431 today; she had a CTA chest and CT WO chest that showed a LLL pneumonia along with a loculated pleural effusion on the left side; ABG's showed pH 7.31; PC02 61 and p02 61; Intravenous 2 times per day    famotidine  20 mg Oral BID    DULoxetine  30 mg Oral Daily     ipratropium-albuterol, ALPRAZolam, oxyCODONE-acetaminophen, dextrose 5 % and 0.9 % NaCl, glucose, dextrose, glucagon (rDNA), sodium chloride flush, acetaminophen, magnesium hydroxide, ondansetron, heparin (porcine), heparin (porcine), nitroGLYCERIN  IV Drips/Infusions   dextrose 5 % and 0.9 % NaCl      heparin (porcine) 18 Units/kg/hr (12/15/17 0117)     Vitals    Vitals    height is 5' 5\" (1.651 m) and weight is 179 lb 4.8 oz (81.3 kg). Her oral temperature is 100.1 °F (37.8 °C). Her blood pressure is 124/79 and her pulse is 82. Her respiration is 14 and oxygen saturation is 95%. O2 Flow Rate (L/min): 4 L/min  I/O    Intake/Output Summary (Last 24 hours) at 12/15/17 0748  Last data filed at 12/15/17 0401   Gross per 24 hour   Intake           324.86 ml   Output                0 ml   Net           324.86 ml     Patient Vitals for the past 96 hrs (Last 3 readings):   Weight   12/15/17 0431 179 lb 4.8 oz (81.3 kg)   12/14/17 2010 182 lb 11.2 oz (82.9 kg)   12/14/17 1221 170 lb (77.1 kg)     Exam   Constitutional: Patient appears moderately built and moderately nourished. Appears ill. Head: Normocephalic and atraumatic. Mouth/Throat: Oropharynx is clear and dry. No oral thrush. Eyes: Conjunctivae are normal. Pupils are equal, round, and reactive to light. No scleral icterus. Neck: Neck supple. No JVD or tracheal deviation present. Cardiovascular: Regular rate, regular rhythm, S1 and S2 with no murmur. No peripheral edema  Pulmonary/Chest: Normal effort with diminished breath sounds. No stridor. No respiratory distress. Abdominal: Soft. Bowel sounds audible. No distension but (+) tenderness to palp  Musculoskeletal: Moves all extremities~noted right BKA  Lymphadenopathy:  No cervical adenopathy. Neurological: Patient is alert and oriented to person   Skin: Skin is warm and dry.       Labs   ABG  Lab joint likely representing an intra-articular loose body. There are multilevel bridging    thoracic vertebral endplate osteophytes consistent with DISH.       Chest/body wall soft tissues: Unremarkable       Thyroid: Unremarkable               Impression       No acute pulmonary embolism. Moderate multiloculated left pleural effusion. Bilateral multilobar atelectasis. Component of pneumonia in the left lower lobe not excluded. Probable severe stenosis of the origin of the left common carotid artery. Additional findings as detailed above. CT chest WO:  FINDINGS: The heart is mildly enlarged. Atherosclerotic calcifications are present in the thoracic aorta and coronary arteries without evidence of aneurysm. There is no pericardial effusion. There is a small partially loculated left-sided pleural    effusion. There are areas of adjacent lung consolidation. In addition, there are more extensive alveolar opacities in the left lung. There is diffuse and irregular subpleural thickening, primarily at the lateral mid left hemithorax. There is a 2.5 cm    subpleural density at the posterior left mid lung (image 27). Calcified granulomas present in the left upper lobe. There are minimal reticular opacities in the right lower lung. There is no mediastinal, hilar or axillary lymphadenopathy. However,    evaluation is limited due to absence of contrast. Degenerative changes are present in the thoracic spine without evidence of aggressive osseous lesions.       Upper abdominal organs are evaluated on same-day CT of the abdomen and pelvis.           Impression   1. Partially loculated small left-sided pleural effusion with adjacent lung atelectasis/infiltrate. 2. Irregular pleural thickening in the left hemithorax highly suspicious for an infiltrating neoplastic process. 3. Focal subpleural nodule at the posterior left mid lung, possibly a pseudotumor secondary to trapped pleural fluid.  However, malignancy cannot on the BiPAP with full face mask. Left side loculated pleural effusion due to ? Para pneumonic effusion. UTI with enteric gram negative bacilli on antibiotic therapy. Lab Results   Component Value Date    PH 7.37 12/15/2017    PH 7.44 08/15/2015    PH 5.5 06/13/2012    PO2 70 12/15/2017    PO2 105 06/19/2012    PCO2 50 12/15/2017    PCO2 47 06/19/2012    HCO3 29 12/15/2017    HCO3 28.7 08/07/2015    O2SAT 93 12/15/2017     Lab Results   Component Value Date    IFIO2 40 12/15/2017    MODE CPAP/PS 12/15/2017    SETTIDVOL 590.0 06/18/2012    SETPEEP 6.0 12/15/2017     Plan: Will keep her on BiPAP continuously at night time and PRN during daytime. For left side thoracentesis by IR. Hold all sedative meds including Seroquel and Lyrica if she is drowsy.     Cholo Li MD 12/15/2017 3:50 PM

## 2017-12-15 NOTE — PROGRESS NOTES
[address] 3B  Bedside Swallowing Evaluation      SLP Individual Minutes  Time In: 0707  Time Out: 5485  Minutes: 21          Date: 12/15/2017  Patient Name: Adelaida Waters      CSN: [id number]   : 1960  (61 y.o.)  Gender: female   Referring Physician:  Dereck Hopkins CNP  Diagnosis: Pneumonia  Secondary Diagnosis:  Dysphagia  History of Present Illness/Injury: Pt admit with the above diagnosis.   Concern for aspiration with speech ordered to assess current swallowing function to determine safety for resuming oral diet  Past Medical History:   Diagnosis Date    Anxiety     Arthritis     Atherosclerotic PVD with ulceration: Left foot ulcer 2013    3/29/2013: BOGDAN RT : 0.50, LT : 0.42     CAD (coronary artery disease)     Chest pressure     Chronic back pain     Chronic pain     Depression     Fibromyalgia     Hyperlipidemia     Hypertension     Mild mitral regurgitation     Neuropathy ([address]     Onychomycosis     RSD (reflex sympathetic dystrophy)     S/P CAB2013: CABG X 3     Schizoaffective disorder ([address]     Stroke ([facility] [address] 12    no deficits    Type II or unspecified type diabetes mellitus without mention of complication, not stated as uncontrolled        Pain:  0/10    Current Diet: NPO pending speech eval    Respiratory Status: [x] Bipap mask, removed for brief periods for po trials    Behavioral Observation: [x] Alert , confused    ORAL MECHANISM EVALUATION:         Comments:  Facial / Labial [x]WFL [] Impaired []DNT    Lingual []WFL [x] Impaired []DNT Mild weakness   Dentition []WFL [x] Impaired []DNT Sparse dentition   Velum [x]WFL [] Impaired []DNT    Vocal Quality [x]WFL [] Impaired []DNT    Sensation []WFL [] Impaired [x]DNT    Cough [x]WFL [] Impaired []DNT    Other: []WFL [] Impaired []DNT    Other: []WFL [] Impaired []DNT        PATIENT WAS EVALUATED USING:   thin liquids by cup and Son/Daughter [] Parent     [] Other:   Education: [x] Reviewed results and recommendations of this evaluation     [x] Reviewed diet and strategies     [x] Reviewed signs, symptoms and risk of aspiration     [] Demonstrated how to thick liquid appropriately. [x] Reviewed goals and Plan of Care     [] OTHER:   Method: [x] Discussion [] Demonstration [] Hand-out     [] OTHER:   Evaluation of Education:     [] Verbalizes understanding  [] Demonstrates with assistance     [] Demonstrates without assistance [x]Needs further instruction     [x] No evidence of learning  [x] Family not present    PATIENT GOALS: [x] Pt did not state. Will further assess during treatment. [] Return to the least restricted diet possible     [] Return to previous level of function     [] OTHER:    PLAN / TREATMENT RECOMMENDATIONS:  [x] Skilled SLP intervention on acute care 3-5 x per week or until goals met and/or pt plateaus in function. Specific interventions for next session may include: diet and pulmonary monitor. SHORT TERM GOALS:  Short-term Goals  Timeframe for Short-term Goals: 2 weeks  Goal 1: Pt will tolerate Dysphagia II and thin diet without overt aspiration to ensure safe and adequate nutrition and hydration  Goal 2: Pt will tolerate trials of advanced solids without overt aspiration to 10/10 tmes to advance pt to least restricitive diet.   Goal 3: Monitor pulmonary status and complete further instrumental assessment as indicated         Francis Diego M.S., [id number]

## 2017-12-15 NOTE — PROGRESS NOTES
Nutrition Assessment    Type and Reason for Visit: Initial, Positive Nutrition Screen (chewing/swallowing difficulty)    Nutrition Recommendations: Continue current diet & ONS. Malnutrition Assessment:  · Malnutrition Status: At risk for malnutrition  · Findings of the 6 clinical characteristics of malnutrition (Minimum of 2 out of 6 clinical characteristics is required to make the diagnosis of moderate or severe Protein Calorie Malnutrition based on AND/ASPEN Guidelines):  1. Energy Intake-Less than or equal to 50%, not able to assess    2. Weight Loss-No significant weight loss,    3. Fat Loss-No significant subcutaneous fat loss,    4. Muscle Loss-No significant muscle mass loss,    5. Fluid Accumulation-No significant fluid accumulation,    Nutrition Diagnosis:   · Problem: Inadequate energy intake  · Etiology: related to Insufficient energy/nutrient consumption     Signs and symptoms:  as evidenced by Patient report of    Nutrition Assessment:  · Subjective Assessment: Pt. seen. Wearing bipap. Has diffiuclty talking with bipap. Pt. had bedside swallow eval to day by SLP & SLP suggested Dysphagia II  No Straws diet. Meds include: Lantus, Humalog, Insulin Regular. Chemsticks [id number]. · Wound Type: Unstageable (buttocks right )  · Current Nutrition Therapies:  · Oral Diet Orders: Dysphagia 2   · Oral Diet intake: 0% (per pt. )  · Oral Nutrition Supplement (ONS) Orders: Diabetic Oral Supplement  · ONS intake: Unable to assess (new order)  · Anthropometric Measures:  · Ht: 5' 5\" (165.1 cm)   · Current Body Wt: 179 lb 3.7 oz (81.3 kg)  · Admission Body Wt: 182 lb 12.2 oz (82.9 kg) (12/14)  · Usual Body Wt: 169 lb 15.6 oz (77.1 kg) (9/7/17 per EMR)  · % Weight Change:  ,   -2% wt. Loss since admit  · Ideal Body Wt: 125 lb (56.7 kg), % Ideal Body 143%  · Adjusted Body Wt: 133#/60kgm (adjsuted for rt.  BKA), BMI Classification: Obese class 1  · Comparative Standards (Estimated Nutrition Needs):  · Estimated Daily Total Kcal: 4225-7413  Estimated Daily Protein (g):60-72 grams   Estimated Intake vs Estimated Needs: Intake Less Than Needs    Nutrition Risk Level: High    Nutrition Interventions:   Continue current diet, Start ONS  Continued Inpatient Monitoring, Education not appropriate at this time  Encouraged small frequent  meals as tolerated. Nutrition Evaluation:   · Evaluation: Goals set   · Goals: Pt. will consume 75% or more at meals safely during LOS. · Monitoring: Meal Intake, Supplement Intake, Chewing/Swallowing, Pertinent Labs, Comparative Standards, Weight, Mental Status/Confusion, Wound Healing    See Adult Nutrition Doc Flowsheet for more detail.      Electronically signed by Mya Thornton RD, LD on 12/15/17 at 3:00 PM    Contact Number: [phone]

## 2017-12-15 NOTE — PROGRESS NOTES
Pt arrived in 4K 28 from ED. Patient complains of abdominal pain of a 2 out of 10. Patient on 4 liters O2 via nasal cannula. Daughter came up with patient. Oriented to room and given call light. Patient stable at this time.

## 2017-12-15 NOTE — PLAN OF CARE
Problem: Nutrition  Goal: Optimal nutrition therapy  Outcome: Ongoing  Nutrition Problem: Inadequate energy intake  Intervention: Food and/or Nutrient Delivery: Continue current diet, Start ONS  Nutritional Goals: Pt. will consume 75% or more at meals safely during LOS.

## 2017-12-15 NOTE — FLOWSHEET NOTE
12/15/17 0003 12/15/17 0004   Provider Notification   Reason for Communication Evaluate  (New consult) Evaluate  (New consult)   Provider Name Greg Sidhu   Provider Notification Physician Assistant Physician Assistant   Method of Communication Secure Message Secure Message   Response Waiting for response See orders   Notification Time 0003 0004   Message for new consult due to elevated troponin.  Add patient to Dr. John Espinoza list and keep NPO at this time

## 2017-12-15 NOTE — H&P
Fibromyalgia     Hyperlipidemia     Hypertension     Mild mitral regurgitation     Neuropathy (HCC)     Onychomycosis     RSD (reflex sympathetic dystrophy)     S/P CAB2013: CABG X 3     [facility]oa[facility] disorder ([facility] [address]     Stroke ([facility] [address] 12    no deficits    Type II or unspecified type diabetes mellitus without mention of complication, not stated as uncontrolled        Past Surgical History:        Procedure Laterality Date    CARDIAC SURGERY      CARDIOVASCULAR STRESS TEST  12    normal LV size and fx, mildly intense perfusion defect of the anterior wall which is reversible on rest images.  CHOLECYSTECTOMY      CORONARY ARTERY BYPASS GRAFT  2012    FOOT SURGERY Right 2014    DEBRIDEMENT HEEL    HYSTERECTOMY      LEG AMPUTATION BELOW KNEE Right 14    Dr. Shireen Machuca N/A 2017    LUMBAR SNRB performed by Natalie Garay MD at [address] [place] [address] OTHER SURGICAL HISTORY Right 10/31/14    Debridement of Right Below Knee Amputation Stump and Reclosure of Stump - Dr. Shy Viramontes Bilateral 2017    Lumbar Facet MBB L3-4, L4-5, L5-S1    OTHER SURGICAL HISTORY Left 2017    Lumbar Sympathetic Block, Left Side    OTHER SURGICAL HISTORY Bilateral 2017    lumbar smpathetic nerve block bilateral    TRANSTHORACIC ECHOCARDIOGRAM  12    EF 55-60% mild mitral regurg       Home Medications:   No current facility-administered medications on file prior to encounter. Current Outpatient Prescriptions on File Prior to Encounter   Medication Sig Dispense Refill    oxyCODONE-acetaminophen (PERCOCET) 7.5-325 MG per tablet Take 1 tablet by mouth every 6 hours as needed for Pain . 120 tablet 0    pregabalin (LYRICA) 75 MG capsule Take 1 capsule by mouth every 6 hours .  120 capsule 0    ALPRAZolam (XANAX) 1 MG tablet Take 1 tablet by mouth 3 times daily as needed for Anxiety 90 tablet 0    SEROQUEL  MG XR tablet take 1 tablet by mouth every evening 30 tablet 5    metFORMIN (GLUCOPHAGE) 500 MG tablet Take 1 tablet by mouth 2 times daily (with meals) 60 tablet 5    nitroGLYCERIN (NITROSTAT) 0.4 MG SL tablet Place 1 tablet under the tongue every 5 minutes as needed for Chest pain. 25 tablet 3    ibuprofen (ADVIL;MOTRIN) 100 MG/5ML suspension take 40 milliliters by mouth every 8 hours if needed 946 mL 2    naproxen (NAPROSYN) 500 MG tablet Take 1 tablet by mouth 2 times daily (with meals) for 14 days 28 tablet 0    DULoxetine (CYMBALTA) 30 MG extended release capsule Take 1 capsule by mouth daily 30 capsule 11    Blood Glucose Monitoring Suppl (ONE TOUCH Koding SYSTEM) W/DEVICE KIT 1 kit by Does not apply route daily 1 kit 0    metoprolol tartrate (LOPRESSOR) 25 MG tablet Take 1 tablet by mouth 2 times daily 60 tablet 5    lisinopril (PRINIVIL;ZESTRIL) 20 MG tablet Take 1 tablet by mouth daily 30 tablet 5    diclofenac sodium 1 % GEL Apply 2 g topically 3 times daily 3 Tube 5    Elastic Bandages & Supports (POST-OP SHOE/SOFT TOP WOMEN) MISC Wear when using foot to propel wheelchair. 1 each 0    glucose monitoring kit (FREESTYLE) monitoring kit Dispense ALL required supplies to test 4 times daily. Dx: 250.02 Any brand device is acceptable. 1 kit 11    insulin lispro (HUMALOG) 100 UNIT/ML injection vial Inject 0-12 Units into the skin 3 times daily (before meals). (Patient taking differently: Inject 0-12 Units into the skin daily ) 1 vial 3    insulin glargine (LANTUS) 100 UNIT/ML injection pen Inject 28 Units into the skin daily. 3 Pen 0    Insulin Pen Needle 31G X 5 MM MISC Inject insulin SQ 4 x daily (31Gx 3/16\") 5mm 200 each 5    glucose blood VI test strips (ASCENSIA AUTODISC VI;ONE TOUCH ULTRA TEST VI) strip Add lancets. Check 4 times per day; Patient needs NEW METER, STRIPS PLUS LANCETS.  TESTING 4 TIMES DAILY 200 strip 5    aspirin 81 MG EC tablet Take 81 mg by mouth daily. Allergies:  Latex; Lorazepam; Gabapentin; Wellbutrin [bupropion]; and Tape [adhesive tape]    Social History:    reports that she has been smoking Cigarettes. She has a 17.00 pack-year smoking history. She has never used smokeless tobacco. She reports that she does not drink alcohol or use drugs. Family History:       Problem Relation Age of Onset    Diabetes Mother     Stroke Mother     Cancer Mother     Heart Disease Father     Cancer Maternal Grandmother        Review of systems:  Constitutional: no fever, no night sweats, no fatigue  Head: no headache, no head injury, no migranes. Eye: no blurring of vision, no double vision. Ears: no hearing difficulty, no tinnitus  Mouth/throat: no ulceration, dental caries, dysphagia  Lungs: no cough, + shortness of breath, no wheeze  CVS: no palpitation, no chest pain, + shortness of breath  GI: no abdominal pain, no nausea , no vomiting, no constipation  YEISON: no dysuria, frequency and urgency, no hematuria, no kidney stones  Musculoskeletal: +bilat leg  pain, no swelling , stiffness  Endocrine: no polyuria, polydypsia, no cold or heat intolerence  Hematology: no anemia, no easy brusing or bleeding, no hx of clotting disorder  Dermatology: no skin rash, no eczema, no prurities,  Psychiatry: no depression, no anxiety,no panic attacks, no suicide ideation  Neurology: no syncope, no seizures, no numbness or tingling of hands, no numbness or tingling of feet, no paresis    10 point review of systems completed, all other than noted above are negative. Vitals:   Vitals:    12/14/17 1852   BP: 81/66   Pulse: 77   Resp:    Temp:    SpO2: 94%      BMI: Body mass index is 26.63 kg/m².                 Exam:  Physical Examination: General appearance - alert, ill appearing, and in no distress  Mental status - alert, oriented to person, unable decipher if oriented to place  Neck - supple, no significant adenopathy, no JVD, or carotid bruits  Chest - clear to auscultation, no wheezes, rales or rhonchi, symmetric air entry  Heart - normal rate, regular rhythm, normal S1, S2, no murmurs, rubs, clicks or gallops  Abdomen - soft, nontender, nondistended, no masses or organomegaly  Neurological - alert, oriented, delayed response, expressive aphasia, no focal findings or movement disorder noted  Musculoskeletal - s/p right BKA, no swelling, no tenderness to palpation  Extremities - peripheral pulses normal, no pedal edema, no clubbing or cyanosis  Skin - normal coloration and turgor, no rashes, no suspicious skin lesions noted      Review of Labs and Diagnostic Testing:    Recent Results (from the past 24 hour(s))   Lipase    Collection Time: 12/14/17 12:52 PM   Result Value Ref Range    Lipase 15.9 5.6 - 51.3 U/L   Comprehensive Metabolic Panel    Collection Time: 12/14/17 12:52 PM   Result Value Ref Range    Glucose 342 (H) 70 - 108 mg/dL    CREATININE 0.8 0.4 - 1.2 mg/dL    BUN 15 7 - 22 mg/dL    Sodium 138 135 - 145 meq/L    Potassium 4.1 3.5 - 5.2 meq/L    Chloride 96 (L) 98 - 111 meq/L    CO2 29 23 - 33 meq/L    Calcium 9.0 8.5 - 10.5 mg/dL    AST 10 5 - 40 U/L    Alkaline Phosphatase 84 38 - 126 U/L    Total Protein 7.3 6.1 - 8.0 g/dL    Alb 3.7 3.5 - 5.1 g/dL    Total Bilirubin 0.6 0.3 - 1.2 mg/dL    ALT 7 (L) 11 - 66 U/L   Lactic acid, plasma    Collection Time: 12/14/17 12:52 PM   Result Value Ref Range    Lactic Acid 2.0 0.5 - 2.2 mmol/L   Anion Gap    Collection Time: 12/14/17 12:52 PM   Result Value Ref Range    Anion Gap 13.0 8.0 - 16.0 meq/L   Glomerular Filtration Rate, Estimated    Collection Time: 12/14/17 12:52 PM   Result Value Ref Range    Est, Glom Filt Rate 74 (A) ml/min/1.73m2   Osmolality    Collection Time: 12/14/17 12:52 PM   Result Value Ref Range    Osmolality Calc 290.0 275.0 - 300 mOsmol/kg   CBC auto differential    Collection Time: 12/14/17 12:53 PM   Result Value Ref Range    WBC 14.9 (H) 4.8 - 10.8 thou/mm3 RBC 4.55 4.20 - 5.40 mill/mm3    Hemoglobin 14.1 12.0 - 16.0 gm/dl    Hematocrit 42.7 37.0 - 47.0 %    MCV 93.9 81.0 - 99.0 fL    MCH 31.1 (H) 27.0 - 31.0 pg    MCHC 33.1 33.0 - 37.0 gm/dl    RDW 13.8 11.5 - 14.5 %    Platelets 133 589 - 852 thou/mm3    MPV 9.2 7.4 - 10.4 mcm    Seg Neutrophils 87.9 %    Lymphocytes 5.9 %    Monocytes 5.9 %    Eosinophils 0.0 %    Basophils 0.3 %    nRBC 0 /100 wbc    Segs Absolute 13.1 (H) 1.8 - 7.7 thou/mm3    Lymphocytes # 0.9 (L) 1.0 - 4.8 thou/mm3    Monocytes # 0.9 0.4 - 1.3 thou/mm3    Eosinophils # 0.0 0.0 - 0.4 thou/mm3    Basophils # 0.0 0.0 - 0.1 thou/mm3   Troponin    Collection Time: 12/14/17  3:21 PM   Result Value Ref Range    Troponin T 0.020 (A) ng/ml   POCT glucose    Collection Time: 12/14/17  3:22 PM   Result Value Ref Range    Glucose 288 mg/dL    QC OK?  Yes    POCT Glucose    Collection Time: 12/14/17  3:22 PM   Result Value Ref Range    POC Glucose 288 (H) 70 - 108 mg/dl   EKG Abd Pain    Collection Time: 12/14/17  3:22 PM   Result Value Ref Range    Ventricular Rate 82 BPM    Atrial Rate 82 BPM    P-R Interval 162 ms    QRS Duration 92 ms    Q-T Interval 396 ms    QTc Calculation (Bazett) 462 ms    P Axis 55 degrees    R Axis 70 degrees    T Axis 114 degrees   Urine with Reflexed Micro    Collection Time: 12/14/17  4:02 PM   Result Value Ref Range    Glucose, Ur >= 1000 (A) NEGATIVE mg/dl    Bilirubin Urine SMALL (A) NEGATIVE    Ketones, Urine TRACE (A) NEGATIVE    Specific Gravity, Urine > 1.030 (A) 1.002 - 1.03    Blood, Urine LARGE (A) NEGATIVE    pH, UA 5.5 5.0 - 9.0    Protein,  (A) NEGATIVE    Urobilinogen, Urine 1.0 0.0 - 1.0 eu/dl    Nitrite, Urine NEGATIVE NEGATIVE    Leukocyte Esterase, Urine NEGATIVE NEGATIVE    Color, UA YELLOW STRAW-YELL    Character, Urine CLOUDY (A) CLEAR-SL C    RBC, UA 5-10 0-2/hpf /hpf    WBC, UA 25-50 0-4/hpf /hpf    Epi Cells 0-2 3-5/hpf /hpf    Bacteria, UA MODERATE FEW/NONE S /hpf    Casts UA 4-8 HYALINE NONE SEEN /lpf    Crystals NONE SEEN NONE SEEN    Renal Epithelial, Urine NONE SEEN NONE SEEN    Yeast, UA NONE SEEN NONE SEEN    CASTS 2 NONE SEEN NONE SEEN /lpf    MISCELLANEOUS 2 NONE SEEN    Bile Acids, Total    Collection Time: 12/14/17  4:02 PM   Result Value Ref Range    Ictotest NEGATIVE NEGATIVE   POCT glucose    Collection Time: 12/14/17  5:23 PM   Result Value Ref Range    POC Glucose 209 (H) 70 - 108 mg/dl   Lactic acid, plasma    Collection Time: 12/14/17  5:30 PM   Result Value Ref Range    Lactic Acid 1.6 0.5 - 2.2 mmol/L   POCT glucose    Collection Time: 12/14/17  8:21 PM   Result Value Ref Range    POC Glucose 291 (H) 70 - 108 mg/dl       Radiology:     Ct Chest Wo Contrast    Result Date: 12/14/2017  PROCEDURE: CT CHEST WO CONTRAST CLINICAL INFORMATION: Left lung fluid collection TECHNIQUE: CT of the chest was performed without use of intravenous contrast. Axial images as well as coronal and sagittal reconstructions were obtained. All CT scans at this facility use dose modulation, iterative reconstruction, and/or weight-based dosing when appropriate to reduce radiation dose to as low as reasonably achievable. COMPARISON: None FINDINGS: The heart is mildly enlarged. Atherosclerotic calcifications are present in the thoracic aorta and coronary arteries without evidence of aneurysm. There is no pericardial effusion. There is a small partially loculated left-sided pleural effusion. There are areas of adjacent lung consolidation. In addition, there are more extensive alveolar opacities in the left lung. There is diffuse and irregular subpleural thickening, primarily at the lateral mid left hemithorax. There is a 2.5 cm subpleural density at the posterior left mid lung (image 27). Calcified granulomas present in the left upper lobe. There are minimal reticular opacities in the right lower lung. There is no mediastinal, hilar or axillary lymphadenopathy.  However, evaluation is limited due to absence of contrast. Degenerative changes are present in the thoracic spine without evidence of aggressive osseous lesions. Upper abdominal organs are evaluated on same-day CT of the abdomen and pelvis. 1. Partially loculated small left-sided pleural effusion with adjacent lung atelectasis/infiltrate. 2. Irregular pleural thickening in the left hemithorax highly suspicious for an infiltrating neoplastic process. 3. Focal subpleural nodule at the posterior left mid lung, possibly a pseudotumor secondary to trapped pleural fluid. However, malignancy cannot be excluded. 4. Minimal right basilar atelectasis/infiltrate. 5. Cardiomegaly Final report electronically signed by Dr. Taz Flores on 12/14/2017 5:01 PM    Ct Abdomen Pelvis W Iv Contrast Additional Contrast? Oral    Result Date: 12/14/2017  PROCEDURE: CT ABDOMEN PELVIS W IV CONTRAST CLINICAL INFORMATION: Abdominal pain. COMPARISON: CT of the abdomen and pelvis dated January 10, 2015 TECHNIQUE: 5 mm axial CT images were obtained through the abdomen and pelvis after the administration of 80 mL Isovue-370 intravenous and oral contrast. Coronal and sagittal reconstructions were obtained. All CT scans at this facility use dose modulation, iterative reconstruction, and/or weight-based dosing when appropriate to reduce radiation dose to as low as reasonably achievable. FINDINGS: There is patchy density and atelectasis at the left lung base as well as a fluid collection at the posterior medial aspect. The fluid collection measures approximately 2.8 x 6.1 x 4.1 cm. The base of the heart is within appropriate limits. The liver is unremarkable. The spleen is unremarkable. There is redemonstration of a nodule along the lateral limb of the left adrenal gland which measures 2.4 x 2.7 x 2.4 cm which is stable from the prior exam and likely represents an adrenal adenoma. The pancreas is within normal limits. The gallbladder is surgically absent.  No intra-or extrahepatic biliary ductal dilation. There is no hydronephrosis or stones of either kidney. No renal masses are noted. No ascites or free air. No other fluid collection. No abnormalities of the small or large bowel loops are noted. The appendix is within normal limits. The IVC and aorta are of normal caliber. There is no adenopathy. The urinary bladder is normal. The uterus and ovaries are surgically absent. There is no pelvic free fluid. There is no adenopathy. Degenerative changes are noted within the bilateral hips and spine. No suspicious osseous lesions are identified. 1. There is a fluid collection at the left lung base with overlying patchy density. The fluid collection may represent a loculated effusion versus empyema. Overlying patchy density may represent atelectasis or infectious infiltrate. 2. No acute abnormality is identified within the abdomen or pelvis. **This report has been created using voice recognition software. It may contain minor errors which are inherent in voice recognition technology. ** Final report electronically signed by Dr. Iris Coats on 12/14/2017 3:40 PM        EKG: Normal sinus rhythm  Possible Left atrial enlargement      Assessment / Plan:    1. PNA (pneumonia)- gentle hydration, Rocephin, Vanco, duonebs prn, procal, blood cultures, UA, lactic acid, swallow eval, Pulm consult  2. Schizoaffective disorder ([facility] [address] treated  3. Benign essential HTN- resume home meds with parameters to hold  4. CAD (coronary artery disease)- NTG sl prn, Lopressor, Lisinopril, ASA  5. Peripheral vascular disease (HCC)-monitor lipids  6. Insulin dependent type 2 diabetes mellitus, uncontrolled (HCC)-SSI, HgA1C  7. Tobacco abuse- cessation counseling  8. Complex regional pain syndrome type 1 of both lower extremities  9. Acute cystitis- Rocephin, await culture  10. Pleural mass- Pulm consult  11.    NSTEMI- trend troponin, EKG, 2 d echo, BNP, Nitro and Heparin infusion, cardiology consult  12. Hypoxia, poss d/t #1- ABGs, duonebs, pulse ox to keep 02 > 92%      Dispo: Admit, gentle hydration, IV antibiotics, await pending labs, Cardio and Pulm consults.  Hospitalist service will follow        DVT prophylaxis: [] Lovenox                                 [] SCDs                                 [] SQ Heparin                                 [] Encourage ambulation, low risk for DVT, no chemical or mechanical prophylaxis necessary              [x] Already on Anticoagulation                Anticipated Disposition upon discharge: [] Home                                                                         [x] Home with Home Health                                                                         [] [facility]                                                                         [] [address]          Electronically signed by Cori Do DO on 12/14/2017 at 10:04 PM

## 2017-12-16 LAB
ALLEN TEST: ABNORMAL
APTT: 54.8 SECONDS (ref 22–38)
APTT: 61.3 SECONDS (ref 22–38)
BASE EXCESS (CALCULATED): 0.7 MMOL/L (ref -2.5–2.5)
COLLECTED BY:: ABNORMAL
DEVICE: ABNORMAL
EKG ATRIAL RATE: 71 BPM
EKG P AXIS: 46 DEGREES
EKG P-R INTERVAL: 144 MS
EKG Q-T INTERVAL: 394 MS
EKG QRS DURATION: 86 MS
EKG QTC CALCULATION (BAZETT): 428 MS
EKG R AXIS: 51 DEGREES
EKG T AXIS: 89 DEGREES
EKG VENTRICULAR RATE: 71 BPM
GLUCOSE BLD-MCNC: 191 MG/DL (ref 70–108)
GLUCOSE BLD-MCNC: 200 MG/DL (ref 70–108)
GLUCOSE BLD-MCNC: 231 MG/DL (ref 70–108)
GLUCOSE BLD-MCNC: 246 MG/DL (ref 70–108)
GLUCOSE BLD-MCNC: 267 MG/DL (ref 70–108)
HCO3: 27 MMOL/L (ref 23–28)
IFIO2: 40
MODE: ABNORMAL
O2 SATURATION: 94 %
PCO2: 46 MMHG (ref 35–45)
PH BLOOD GAS: 7.37 (ref 7.35–7.45)
PLATELET # BLD: 170 THOU/MM3 (ref 130–400)
PO2: 71 MMHG (ref 71–104)
SET PEEP: 6 MMHG
SET PRESS SUPP: 12 CMH2O
SOURCE, BLOOD GAS: ABNORMAL
TROPONIN T: 0.02 NG/ML

## 2017-12-16 PROCEDURE — 99233 SBSQ HOSP IP/OBS HIGH 50: CPT | Performed by: NURSE PRACTITIONER

## 2017-12-16 PROCEDURE — 6370000000 HC RX 637 (ALT 250 FOR IP): Performed by: INTERNAL MEDICINE

## 2017-12-16 PROCEDURE — 6360000002 HC RX W HCPCS: Performed by: FAMILY MEDICINE

## 2017-12-16 PROCEDURE — 6370000000 HC RX 637 (ALT 250 FOR IP): Performed by: FAMILY MEDICINE

## 2017-12-16 PROCEDURE — 36415 COLL VENOUS BLD VENIPUNCTURE: CPT

## 2017-12-16 PROCEDURE — 94669 MECHANICAL CHEST WALL OSCILL: CPT

## 2017-12-16 PROCEDURE — 93005 ELECTROCARDIOGRAM TRACING: CPT

## 2017-12-16 PROCEDURE — 94660 CPAP INITIATION&MGMT: CPT

## 2017-12-16 PROCEDURE — 85049 AUTOMATED PLATELET COUNT: CPT

## 2017-12-16 PROCEDURE — 99233 SBSQ HOSP IP/OBS HIGH 50: CPT | Performed by: INTERNAL MEDICINE

## 2017-12-16 PROCEDURE — 84484 ASSAY OF TROPONIN QUANT: CPT

## 2017-12-16 PROCEDURE — 36600 WITHDRAWAL OF ARTERIAL BLOOD: CPT

## 2017-12-16 PROCEDURE — 99232 SBSQ HOSP IP/OBS MODERATE 35: CPT | Performed by: PHYSICIAN ASSISTANT

## 2017-12-16 PROCEDURE — 82803 BLOOD GASES ANY COMBINATION: CPT

## 2017-12-16 PROCEDURE — 2700000000 HC OXYGEN THERAPY PER DAY

## 2017-12-16 PROCEDURE — 6370000000 HC RX 637 (ALT 250 FOR IP): Performed by: NURSE PRACTITIONER

## 2017-12-16 PROCEDURE — 82948 REAGENT STRIP/BLOOD GLUCOSE: CPT

## 2017-12-16 PROCEDURE — 2140000000 HC CCU INTERMEDIATE R&B

## 2017-12-16 PROCEDURE — 85730 THROMBOPLASTIN TIME PARTIAL: CPT

## 2017-12-16 PROCEDURE — 2580000003 HC RX 258: Performed by: FAMILY MEDICINE

## 2017-12-16 PROCEDURE — 94640 AIRWAY INHALATION TREATMENT: CPT

## 2017-12-16 RX ADMIN — DULOXETINE HYDROCHLORIDE 30 MG: 30 CAPSULE, DELAYED RELEASE ORAL at 09:10

## 2017-12-16 RX ADMIN — ATORVASTATIN CALCIUM 40 MG: 40 TABLET, FILM COATED ORAL at 20:59

## 2017-12-16 RX ADMIN — INSULIN LISPRO 2 UNITS: 100 INJECTION, SOLUTION INTRAVENOUS; SUBCUTANEOUS at 16:48

## 2017-12-16 RX ADMIN — Medication 10 ML: at 21:02

## 2017-12-16 RX ADMIN — IPRATROPIUM BROMIDE AND ALBUTEROL SULFATE 1 AMPULE: .5; 3 SOLUTION RESPIRATORY (INHALATION) at 07:32

## 2017-12-16 RX ADMIN — IPRATROPIUM BROMIDE AND ALBUTEROL SULFATE 1 AMPULE: .5; 3 SOLUTION RESPIRATORY (INHALATION) at 15:52

## 2017-12-16 RX ADMIN — IPRATROPIUM BROMIDE AND ALBUTEROL SULFATE 1 AMPULE: .5; 3 SOLUTION RESPIRATORY (INHALATION) at 19:45

## 2017-12-16 RX ADMIN — INSULIN GLARGINE 28 UNITS: 100 INJECTION, SOLUTION SUBCUTANEOUS at 09:20

## 2017-12-16 RX ADMIN — HEPARIN SODIUM AND DEXTROSE 20 UNITS/KG/HR: 10000; 5 INJECTION INTRAVENOUS at 12:26

## 2017-12-16 RX ADMIN — METOPROLOL TARTRATE 25 MG: 25 TABLET ORAL at 20:59

## 2017-12-16 RX ADMIN — FAMOTIDINE 20 MG: 20 TABLET, FILM COATED ORAL at 20:59

## 2017-12-16 RX ADMIN — ASPIRIN 81 MG: 81 TABLET, COATED ORAL at 09:10

## 2017-12-16 RX ADMIN — INSULIN LISPRO 4 UNITS: 100 INJECTION, SOLUTION INTRAVENOUS; SUBCUTANEOUS at 09:19

## 2017-12-16 RX ADMIN — Medication 3 UNITS: at 21:00

## 2017-12-16 RX ADMIN — CLOPIDOGREL 75 MG: 75 TABLET, FILM COATED ORAL at 09:10

## 2017-12-16 RX ADMIN — VANCOMYCIN HYDROCHLORIDE 500 MG: 500 INJECTION, SOLUTION INTRAVENOUS at 18:37

## 2017-12-16 RX ADMIN — METOPROLOL TARTRATE 25 MG: 25 TABLET ORAL at 09:10

## 2017-12-16 RX ADMIN — AZITHROMYCIN 250 MG: 250 TABLET, FILM COATED ORAL at 16:36

## 2017-12-16 RX ADMIN — ACETAMINOPHEN 650 MG: 325 TABLET ORAL at 20:59

## 2017-12-16 RX ADMIN — FAMOTIDINE 20 MG: 20 TABLET, FILM COATED ORAL at 09:10

## 2017-12-16 RX ADMIN — CEFTRIAXONE SODIUM 1 G: 10 INJECTION, POWDER, FOR SOLUTION INTRAVENOUS at 17:01

## 2017-12-16 RX ADMIN — INSULIN LISPRO 4 UNITS: 100 INJECTION, SOLUTION INTRAVENOUS; SUBCUTANEOUS at 12:26

## 2017-12-16 RX ADMIN — VANCOMYCIN HYDROCHLORIDE 1000 MG: 1 INJECTION, SOLUTION INTRAVENOUS at 17:00

## 2017-12-16 RX ADMIN — IPRATROPIUM BROMIDE AND ALBUTEROL SULFATE 1 AMPULE: .5; 3 SOLUTION RESPIRATORY (INHALATION) at 11:49

## 2017-12-16 ASSESSMENT — PAIN SCALES - GENERAL
PAINLEVEL_OUTOF10: 0
PAINLEVEL_OUTOF10: 0
PAINLEVEL_OUTOF10: 4
PAINLEVEL_OUTOF10: 0
PAINLEVEL_OUTOF10: 4
PAINLEVEL_OUTOF10: 0

## 2017-12-16 ASSESSMENT — PAIN DESCRIPTION - PAIN TYPE
TYPE: ACUTE PAIN
TYPE: CHRONIC PAIN

## 2017-12-16 ASSESSMENT — PAIN DESCRIPTION - LOCATION
LOCATION: FOOT
LOCATION: LEG

## 2017-12-16 ASSESSMENT — PAIN DESCRIPTION - ORIENTATION: ORIENTATION: RIGHT;LEFT

## 2017-12-16 NOTE — PLAN OF CARE
Problem: Falls - Risk of  Goal: Absence of falls  Outcome: Ongoing  Patient free of falls. Call light within reach. Bed in lowest position. Nonskid socks on. Bed alarm on. Patient able to turn self. Hourly rounding continues. Problem: Risk for Impaired Skin Integrity  Goal: Tissue integrity - skin and mucous membranes  Structural intactness and normal physiological function of skin and  mucous membranes. Outcome: Ongoing  Patient has unstageable pressure wound on right buttocks. Patient being turned every two hours. Will continue to monitor. Problem: Pain:  Goal: Pain level will decrease  Pain level will decrease   Outcome: Ongoing  Patient complaining of back and abdominal pain. Percocet provided. Will continue to monitor. Problem: Discharge Planning:  Goal: Discharged to appropriate level of care  Discharged to appropriate level of care   Outcome: Ongoing  Patient plans to be discharged home alone with home health. Will continue to monitor for additional discharge needs. Problem: Anxiety/Stress:  Goal: Level of anxiety will decrease  Level of anxiety will decrease   Outcome: Ongoing  Patient anxious when mask first put on. Patient on and off mask due to getting anxious and taking mask off. Bipap mask back on patient. Will continue to monitor. Problem: Aspiration:  Goal: Absence of aspiration  Absence of aspiration   Outcome: Ongoing  Swallow eval performed yesterday. Patient placed on dysphagia 2 diet with thin liquids and no straws. Patient taking pills in applesauce. HOB > 30 degrees. Will continue to monitor    Problem: Cardiac Output - Decreased:  Goal: Hemodynamic stability will improve  Hemodynamic stability will improve   Outcome: Ongoing  Patient's vitals stable. Patient remains in normal sinus. Heparin gtt running. BIPAP remains on due to patient being acidotic. Patient remains afebrile. Receiving IV antibiotics. Will continue to monitor with cardiac telemetry.     Problem: Gas Exchange

## 2017-12-16 NOTE — PROGRESS NOTES
[facility]    Patient - Pancho Jones   MRN -  [id number]   [facility] # - [de-identified]   - 1960      Date of Admission -  2017 12:12 PM  Date of evaluation -  2017  Room - 3B--A   Hospital Day - [address]60 Soren Glass MD Primary Care Physician - Bonny Barraza MD   Chief Complaint   ? Lung mass; pneumonia. Active Hospital Problem List      Active Hospital Problems    Diagnosis Date Noted    Generalized abdominal pain [R10.84]     Coronary artery disease involving native coronary artery of native heart without angina pectoris [I25.10]     Acute hypercapnic respiratory failure (HCC) [J96.02]     Pleural effusion, left [J90]     Pneumonia due to organism [J18.9] 2017    Acute cystitis [N30.00] 2017    Pleural mass [J94.9] 2017    Elevated troponin [R74.8] 2017    Hypoxia [R09.02] 2017    Complex regional pain syndrome type 1 of both lower extremities [G90.523]     Tobacco abuse [Z72.0] 2016    Insulin dependent type 2 diabetes mellitus, uncontrolled ([facility] [address] [E11.65, Z79.4] 2015    Peripheral vascular disease ([facility] [address] [I73.9]     CAD (coronary artery disease) [I25.10] 10/29/2014    Benign essential HTN [I10] 10/29/2014    Schizoaffective disorder ([facility] [address] [F25.9] 2014     HPI   Pancho Jones is a 61 y.o. female admitted for abd and back pain;   Pt lives at home alone and has [facility]; she states that she had a 1 day hx of abd and mid back pain; pt states HH RN gave her a NTG SL for c/o chest pain and EMS was called; pt does have chronic back pain and follows with Dr Leanora Gowers; pt has a hx of CVA with aphasia so a through hx is difficult to obtain from pt and no family at bedside; she states she smokes 1 PPD; denies any home use of 02.     She states she has had a non productive cough \"off and on\"; she states she has had some shortness of breath; denies any previous hx of lung blood pressure is 104/68 and her pulse is 62. Her respiration is 20 and oxygen saturation is 94%. O2 Flow Rate (L/min): 6 L/min  I/O    Intake/Output Summary (Last 24 hours) at 12/16/17 1350  Last data filed at 12/15/17 2136   Gross per 24 hour   Intake          1358.71 ml   Output              500 ml   Net           858.71 ml     Patient Vitals for the past 96 hrs (Last 3 readings):   Weight   12/15/17 0431 179 lb 4.8 oz (81.3 kg)   12/14/17 2010 182 lb 11.2 oz (82.9 kg)   12/14/17 1221 170 lb (77.1 kg)     Exam   Constitutional: Patient appears moderately built and moderately nourished. Appears ill. Somnolent on Bipap but does follow commands. Head: Normocephalic and atraumatic. Neck: Neck supple. No JVD or tracheal deviation present. Cardiovascular: Regular rate, regular rhythm, S1 and S2 with no murmur. No peripheral edema  Pulmonary/Chest: Normal effort with diminished breath sounds L>R. No stridor. No respiratory distress omn Bipap. Abdominal: Soft. Bowel sounds audible. No distension, no tenderness to palpation. Musculoskeletal: Moves all extremities with right BKA  Neurological: More somnolent on bipap with xanax but no focal deficits. Skin: Skin is warm and dry. No clubbing or cyanosis.       Labs   ABG  Lab Results   Component Value Date    PH 7.37 12/16/2017    PH 7.44 08/15/2015    PH 5.5 06/13/2012    PO2 71 12/16/2017    PO2 105 06/19/2012    PCO2 46 12/16/2017    PCO2 47 06/19/2012    HCO3 27 12/16/2017    HCO3 28.7 08/07/2015    O2SAT 94 12/16/2017     Lab Results   Component Value Date    IFIO2 40 12/16/2017    MODE CPAP/PS 12/16/2017    SETTIDVOL 590.0 06/18/2012    SETPEEP 6.0 12/16/2017     CBC  Recent Labs      12/14/17   1253  12/14/17   2251  12/15/17   0539  12/16/17   1043   WBC  14.9*  11.9*  13.8*   --    RBC  4.55  3.84*  4.00*   --    HGB  14.1  12.2  12.5   --    HCT  42.7  37.1  38.6   --    MCV  93.9  96.8  96.5   --    MCH  31.1*  31.7*  31.2*   --    MCHC  33.1  32.7* 32.4*   --    RDW  13.8  13.4  13.9   --    PLT  202  165  151  170   MPV  9.2  9.1  9.7   --       BMP  Recent Labs      12/14/17   1252  12/14/17   1522  12/15/17   0539   NA  138   --   140   K  4.1   --   4.5   CL  96*   --   103   CO2  29   --   25   BUN  15   --   17   CREATININE  0.8   --   0.9   GLUCOSE  342*  288  288*   CALCIUM  9.0   --   8.4*     LFT  Recent Labs      12/14/17   1252   AST  10   ALT  7*   BILITOT  0.6   ALKPHOS  84   LIPASE  15.9     TROP  Lab Results   Component Value Date    TROPONINT 0.024 12/16/2017    TROPONINT 0.015 12/15/2017    TROPONINT 0.016 12/15/2017     BNP  Lab Results   Component Value Date    PROBNP 2714.0 12/14/2017    PROBNP 436.8 01/09/2015     D-Dimer  Lab Results   Component Value Date    DDIMER 1302.00 12/14/2017     Lactic Acid  Recent Labs      12/14/17   1252  12/14/17   1730   LACTA  2.0  1.6     INR  No results for input(s): INR, PROTIME in the last 72 hours. PTT  Recent Labs      12/15/17   1257  12/15/17   1911  12/16/17   1043   APTT  61.2*  59.6*  54.8*     Glucose  Recent Labs      12/16/17   0601  12/16/17   0908  12/16/17   1127   POCGLU  231*  200*  246*     UA   Recent Labs      12/14/17   1602   PHUR  5.5   COLORU  YELLOW   PROTEINU  300*   BLOODU  LARGE*   RBCUA  5-10   WBCUA  25-50   BACTERIA  MODERATE   NITRU  NEGATIVE   GLUCOSEU  >= 1000*   BILIRUBINUR  SMALL*   UROBILINOGEN  1.0   KETUA  TRACE*   .   PFTs   None in Epic  Echo 12/15/17   Normal left ventricle size and systolic function.   Ejection fraction was estimated at 55 to 60 %.   There were no regional left ventricular wall motion abnormalities .   Moderately increased left ventricle wall thickness.   Moderate concentric left ventricular hypertrophy.   Doppler parameters were consistent with abnormal left ventricular   relaxation (grade 1 diastolic dysfunction).   The left atrium is Mildly dilated.   There is moderate Left pleural effusions      Mitral Valve   The mitral valve structure screening: Pending     Radiology      CTA chest:  FINDINGS:   Lungs: There is bilateral multilobar atelectasis. There is left lower lobe consolidation felt to represent passive atelectasis related to the effusion. A component of pneumonia in the left lower lobe is not excluded. .       Pleura: There is a moderate multiloculated left pleural effusion. There is no pneumothorax.       Heart: There is mild cardiomegaly. Patient is status post sternotomy for CABG surgery.       Pulmonary vasculature: There is adequate opacification of the pulmonary arteries. There is no pulmonary embolism.       Bella and mediastinum: There is mild subcarinal adenopathy. No hilar adenopathy. There are left hilar calcified lymph nodes.       Thoracic aorta/vascular:? There is no thoracic aortic aneurysm or dissection. There is a probable severe stenosis of the origin of the left common carotid artery. .       Imaged upper abdomen: There is a splenic calcified granuloma. Remainder the imaged upper abdomen is unremarkable.       Musculoskeletal system: There is bilateral glenohumeral joint DJD. There is a nearly 1 cm well-defined corticated calcification anteromedial to the left glenohumeral joint likely representing an intra-articular loose body. There are multilevel bridging    thoracic vertebral endplate osteophytes consistent with DISH.       Chest/body wall soft tissues: Unremarkable       Thyroid: Unremarkable               Impression       No acute pulmonary embolism. Moderate multiloculated left pleural effusion. Bilateral multilobar atelectasis. Component of pneumonia in the left lower lobe not excluded. Probable severe stenosis of the origin of the left common carotid artery. Additional findings as detailed above. CT chest WO:  FINDINGS: The heart is mildly enlarged. Atherosclerotic calcifications are present in the thoracic aorta and coronary arteries without evidence of aneurysm.  There is no pericardial possibly a pseudotumor secondary to trapped pleural fluid  Probable COPD with smoking hx  Possible JAROD   Tobacco Abuse with 1 PPD  CAD with CABG elevated troponin  Hx CVA with aphasia 2012  Type I DM  Essential HTN  PAD with RBKA  Full Code  Recommendations   Continue current ATB  Continue BiPAP at HS and PRN  Stop xanax and hold Lyrical and/or Seroquel if sleepy  Left sided thoracentesis by IR when ok to hold Heparin by cardiology  Send pleural fluid to routine analysis including PH, total protein, LDH, triglycerides, Amylase, cell count, Cytology with cell block, Gram stain and cultures, AFB smear and cultures, Fungal cultures with simultaneous serum total protein and LDH for comparison. Continue Duo Nebs every 4 hours WA  Add MetaNebs  IS/accapella  Wean 02 to to keep sats >90%  Follow all cultures  CXR/ABG PRN  Speech therapy following  Needs sleep eval questionnaire when pt is more awake and alert to answer questions        Case discussed with nurse. Questions and concerns addressed. Meds and Orders reviewed.     Electronically signed by   Lalo Portillo CNP on 12/16/2017 at 1:50 PM

## 2017-12-16 NOTE — PROGRESS NOTES
progress    Patient:  Jaylan Sanchez  YOB: 1960  Date of Service: 12/16/2017  MRN: [id number]   Acct:  [de-identified]   Primary Care Physician: Kristan Wilson MD    Chief Complaint: Abdominal pain, Back pain    History of Present Illness:   History obtained from chart review and the patient. The patient is a 61 y.o. female with h/o Anxiety; PVD, S/p right BKA, CAD, s/p CABG, s/p CVA,  Diabetes, Chronic back pain; Chronic pain; Depression; Fibromyalgia; Hyperlipidemia; Hypertension;  Neuropathy, RSD, Schizoaffective disorder, Stroke with Receptive aphasia who presents to the Emergency Department for the evaluation of abdominal and back pain. Patient is not able to give any history and daughter is no longer at the bedside. Per ED chart review, her abdominal pain started yesterday,  is intermittent and is made worse when she moves. Patient is not ambulating and uses wheelchair to get around. She does have home health nurses provide periodic care at her home. Per nursing staff on the medical floor, patient's daughter notified her that patient had taken her home percocet and Metformin in the ED without notifying ED staff. Patient's daughter also states that patient is prone to over medicating herself with her pain med. Patient shows me her amputated limb when I asked if she had any pain. Patient was noted to have complained of chest pain yesterday and was given Nitro by home health nurse. Patient's troponin is elevated at 0.020 and it is being trended. Patient was hypotensive with SBP- 81/66 so fluid bolus was given. Heparin infusion has been started and Cardiology consulted.           Subjective:-  On bipap continous  Drowsy still, arterial blood gas is ok    Scheduled Meds:   vancomycin (VANCOCIN) intermittent dosing (placeholder)   Other RX Placeholder    vancomycin  500 mg Intravenous Q18H    vancomycin  1,000 mg Intravenous Q18H    ipratropium-albuterol  1 ampule Inhalation Q4H WA    aspirin  81 mg Oral Daily    clopidogrel  75 mg Oral Daily    atorvastatin  40 mg Oral Nightly    azithromycin  250 mg Oral Q24H    insulin glargine  28 Units Subcutaneous Daily    metoprolol tartrate  25 mg Oral BID    cefTRIAXone (ROCEPHIN) IV  1 g Intravenous Q24H    insulin lispro  0-12 Units Subcutaneous TID WC    insulin lispro  0-6 Units Subcutaneous Nightly    sodium chloride flush  10 mL Intravenous 2 times per day    famotidine  20 mg Oral BID    DULoxetine  30 mg Oral Daily     Continuous Infusions:   dextrose 5 % and 0.9 % NaCl      heparin (porcine) 20 Units/kg/hr (12/16/17 1226)     PRN Meds:.ipratropium-albuterol, dextrose 5 % and 0.9 % NaCl, glucose, dextrose, glucagon (rDNA), sodium chloride flush, acetaminophen, magnesium hydroxide, ondansetron, heparin (porcine), heparin (porcine), nitroGLYCERIN    10 point review of systems completed, all other than noted above are negative. Vitals:   Vitals:    12/16/17 1722   BP: 98/86   Pulse: 81   Resp: 20   Temp: 98.9 °F (37.2 °C)   SpO2: 92%      BMI: Body mass index is 29.84 kg/m².                 Exam:  Physical Examination: General appearance - alert, ill appearing, on bipap  Mental status - alert, oriented to person, unable decipher if oriented to place  Neck - supple, no significant adenopathy, no JVD, or carotid bruits  Chest - clear to auscultation, no wheezes, rales or rhonchi, symmetric air entry  Heart - normal rate, regular rhythm, normal S1, S2, no murmurs, rubs, clicks or gallops  Abdomen - soft, nontender, nondistended, no masses or organomegaly  Neurological - alert, oriented, delayed response, expressive aphasia, no focal findings or movement disorder noted  Musculoskeletal - s/p right BKA, no swelling, no tenderness to palpation  Extremities - peripheral pulses normal, no pedal edema, no clubbing or cyanosis  Skin - normal coloration and turgor, no rashes, no suspicious skin lesions noted      Review of Labs and Diagnostic Testing:    Recent Results (from the past 24 hour(s))   APTT    Collection Time: 12/15/17  7:11 PM   Result Value Ref Range    aPTT 59.6 (H) 22.0 - 38.0 seconds   POCT Glucose    Collection Time: 12/15/17  7:21 PM   Result Value Ref Range    POC Glucose 379 (H) 70 - 108 mg/dl   POCT Glucose    Collection Time: 12/16/17  6:01 AM   Result Value Ref Range    POC Glucose 231 (H) 70 - 108 mg/dl   EKG 12 Lead    Collection Time: 12/16/17  6:08 AM   Result Value Ref Range    Ventricular Rate 71 BPM    Atrial Rate 71 BPM    P-R Interval 144 ms    QRS Duration 86 ms    Q-T Interval 394 ms    QTc Calculation (Bazett) 428 ms    P Axis 46 degrees    R Axis 51 degrees    T Axis 89 degrees   POCT Glucose    Collection Time: 12/16/17  9:08 AM   Result Value Ref Range    POC Glucose 200 (H) 70 - 108 mg/dl   Platelet count    Collection Time: 12/16/17 10:43 AM   Result Value Ref Range    Platelets 314 770 - 616 thou/mm3   Troponin    Collection Time: 12/16/17 10:43 AM   Result Value Ref Range    Troponin T 0.024 (A) ng/ml   APTT    Collection Time: 12/16/17 10:43 AM   Result Value Ref Range    aPTT 54.8 (H) 22.0 - 38.0 seconds   POCT Glucose    Collection Time: 12/16/17 11:27 AM   Result Value Ref Range    POC Glucose 246 (H) 70 - 108 mg/dl   Blood Gas, Arterial    Collection Time: 12/16/17 12:09 PM   Result Value Ref Range    pH, Blood Gas 7.37 7.35 - 7.45    PCO2 46 (H) 35 - 45 mmhg    PO2 71 71 - 104 mmhg    HCO3 27 23 - 28 mmol/l    Base Excess (Calculated) 0.7 -2.5 - 2.5 mmol/l    O2 Sat 94 %    IFIO2 40     DEVICE BiPAP     Mode CPAP/PS     SET PEEP 6.0 mmhg    SET PRESS SUPP 12 cmH2O    Alex Test N/A     Source: L Brach     COLLECTED BY: [id number]    POCT Glucose    Collection Time: 12/16/17  4:11 PM   Result Value Ref Range    POC Glucose 191 (H) 70 - 108 mg/dl       Radiology:     Ct Chest Wo Contrast    Result Date: 12/14/2017  PROCEDURE: CT CHEST WO CONTRAST CLINICAL INFORMATION: Left lung fluid collection TECHNIQUE: CT of the chest was performed without use of intravenous contrast. Axial images as well as coronal and sagittal reconstructions were obtained. All CT scans at this facility use dose modulation, iterative reconstruction, and/or weight-based dosing when appropriate to reduce radiation dose to as low as reasonably achievable. COMPARISON: None FINDINGS: The heart is mildly enlarged. Atherosclerotic calcifications are present in the thoracic aorta and coronary arteries without evidence of aneurysm. There is no pericardial effusion. There is a small partially loculated left-sided pleural effusion. There are areas of adjacent lung consolidation. In addition, there are more extensive alveolar opacities in the left lung. There is diffuse and irregular subpleural thickening, primarily at the lateral mid left hemithorax. There is a 2.5 cm subpleural density at the posterior left mid lung (image 27). Calcified granulomas present in the left upper lobe. There are minimal reticular opacities in the right lower lung. There is no mediastinal, hilar or axillary lymphadenopathy. However, evaluation is limited due to absence of contrast. Degenerative changes are present in the thoracic spine without evidence of aggressive osseous lesions. Upper abdominal organs are evaluated on same-day CT of the abdomen and pelvis. 1. Partially loculated small left-sided pleural effusion with adjacent lung atelectasis/infiltrate. 2. Irregular pleural thickening in the left hemithorax highly suspicious for an infiltrating neoplastic process. 3. Focal subpleural nodule at the posterior left mid lung, possibly a pseudotumor secondary to trapped pleural fluid. However, malignancy cannot be excluded. 4. Minimal right basilar atelectasis/infiltrate.  5. Cardiomegaly Final report electronically signed by Dr. Judit Guillen on 12/14/2017 5:01 PM    Ct Abdomen Pelvis W Iv Contrast Additional Contrast? Oral    Result Date: 12/14/2017  PROCEDURE:

## 2017-12-16 NOTE — PROGRESS NOTES
Estimated    Collection Time: 12/15/17  5:39 AM   Result Value Ref Range    Est, Glom Filt Rate 64 (A) ml/min/1.73m2   APTT    Collection Time: 12/15/17  7:11 AM   Result Value Ref Range    aPTT 78.8 (H) 22.0 - 38.0 seconds   Blood Gas, Arterial    Collection Time: 12/15/17  8:37 AM   Result Value Ref Range    pH, Blood Gas 7.34 (L) 7.35 - 7.45    PCO2 56 (H) 35 - 45 mmhg    PO2 78 71 - 104 mmhg    HCO3 30 (H) 23 - 28 mmol/l    Base Excess (Calculated) 3.1 (H) -2.5 - 2.5 mmol/l    O2 Sat 94 %    IFIO2 40     DEVICE BiPAP     Mode CPAP/PS     SET PEEP 8.0 mmhg    SET PRESS SUPP 6 cmH2O    Alex Test N/A     Source: L Brach     COLLECTED BY: [id number]    Troponin    Collection Time: 12/15/17 10:29 AM   Result Value Ref Range    Troponin T 0.015 (A) ng/ml   Lactate dehydrogenase    Collection Time: 12/15/17 10:29 AM   Result Value Ref Range     (H) 100 - 190 U/L   Protein, total    Collection Time: 12/15/17 10:29 AM   Result Value Ref Range    Total Protein 6.2 6.1 - 8.0 g/dL   POCT Glucose    Collection Time: 12/15/17 11:38 AM   Result Value Ref Range    POC Glucose 302 (H) 70 - 108 mg/dl   POCT Glucose    Collection Time: 12/15/17 11:40 AM   Result Value Ref Range    POC Glucose 252 (H) 70 - 108 mg/dl   Blood Gas, Arterial    Collection Time: 12/15/17 12:29 PM   Result Value Ref Range    pH, Blood Gas 7.37 7.35 - 7.45    PCO2 50 (H) 35 - 45 mmhg    PO2 70 (L) 71 - 104 mmhg    HCO3 29 (H) 23 - 28 mmol/l    Base Excess (Calculated) 2.8 (H) -2.5 - 2.5 mmol/l    O2 Sat 93 %    IFIO2 40     DEVICE BiPAP     Mode CPAP/PS     SET RESPIRATORY RATE 18 bpm    SET PEEP 6.0 mmhg    SET PRESS SUPP 12 cmH2O    Alex Test N/A     Source: L Brach     COLLECTED BY: [id number]    APTT    Collection Time: 12/15/17 12:57 PM   Result Value Ref Range    aPTT 61.2 (H) 22.0 - 38.0 seconds   POCT Glucose    Collection Time: 12/15/17  3:14 PM   Result Value Ref Range    POC Glucose 312 (H) 70 - 108 mg/dl   POCT Glucose    Collection Time: 12/15/17  5:36 PM   Result Value Ref Range    POC Glucose 223 (H) 70 - 108 mg/dl   APTT    Collection Time: 12/15/17  7:11 PM   Result Value Ref Range    aPTT 59.6 (H) 22.0 - 38.0 seconds   POCT Glucose    Collection Time: 12/15/17  7:21 PM   Result Value Ref Range    POC Glucose 379 (H) 70 - 108 mg/dl       Radiology:     Ct Chest Wo Contrast    Result Date: 12/14/2017  PROCEDURE: CT CHEST WO CONTRAST CLINICAL INFORMATION: Left lung fluid collection TECHNIQUE: CT of the chest was performed without use of intravenous contrast. Axial images as well as coronal and sagittal reconstructions were obtained. All CT scans at this facility use dose modulation, iterative reconstruction, and/or weight-based dosing when appropriate to reduce radiation dose to as low as reasonably achievable. COMPARISON: None FINDINGS: The heart is mildly enlarged. Atherosclerotic calcifications are present in the thoracic aorta and coronary arteries without evidence of aneurysm. There is no pericardial effusion. There is a small partially loculated left-sided pleural effusion. There are areas of adjacent lung consolidation. In addition, there are more extensive alveolar opacities in the left lung. There is diffuse and irregular subpleural thickening, primarily at the lateral mid left hemithorax. There is a 2.5 cm subpleural density at the posterior left mid lung (image 27). Calcified granulomas present in the left upper lobe. There are minimal reticular opacities in the right lower lung. There is no mediastinal, hilar or axillary lymphadenopathy. However, evaluation is limited due to absence of contrast. Degenerative changes are present in the thoracic spine without evidence of aggressive osseous lesions. Upper abdominal organs are evaluated on same-day CT of the abdomen and pelvis. 1. Partially loculated small left-sided pleural effusion with adjacent lung atelectasis/infiltrate.  2. Irregular pleural thickening in the left hemithorax highly suspicious for an infiltrating neoplastic process. 3. Focal subpleural nodule at the posterior left mid lung, possibly a pseudotumor secondary to trapped pleural fluid. However, malignancy cannot be excluded. 4. Minimal right basilar atelectasis/infiltrate. 5. Cardiomegaly Final report electronically signed by Dr. Keane Bence on 12/14/2017 5:01 PM    Ct Abdomen Pelvis W Iv Contrast Additional Contrast? Oral    Result Date: 12/14/2017  PROCEDURE: CT ABDOMEN PELVIS W IV CONTRAST CLINICAL INFORMATION: Abdominal pain. COMPARISON: CT of the abdomen and pelvis dated January 10, 2015 TECHNIQUE: 5 mm axial CT images were obtained through the abdomen and pelvis after the administration of 80 mL Isovue-370 intravenous and oral contrast. Coronal and sagittal reconstructions were obtained. All CT scans at this facility use dose modulation, iterative reconstruction, and/or weight-based dosing when appropriate to reduce radiation dose to as low as reasonably achievable. FINDINGS: There is patchy density and atelectasis at the left lung base as well as a fluid collection at the posterior medial aspect. The fluid collection measures approximately 2.8 x 6.1 x 4.1 cm. The base of the heart is within appropriate limits. The liver is unremarkable. The spleen is unremarkable. There is redemonstration of a nodule along the lateral limb of the left adrenal gland which measures 2.4 x 2.7 x 2.4 cm which is stable from the prior exam and likely represents an adrenal adenoma. The pancreas is within normal limits. The gallbladder is surgically absent. No intra-or extrahepatic biliary ductal dilation. There is no hydronephrosis or stones of either kidney. No renal masses are noted. No ascites or free air. No other fluid collection. No abnormalities of the small or large bowel loops are noted. The appendix is within normal limits. The IVC and aorta are of normal caliber. There is no adenopathy.   The urinary bladder is normal. The uterus and ovaries are surgically absent. There is no pelvic free fluid. There is no adenopathy. Degenerative changes are noted within the bilateral hips and spine. No suspicious osseous lesions are identified. 1. There is a fluid collection at the left lung base with overlying patchy density. The fluid collection may represent a loculated effusion versus empyema. Overlying patchy density may represent atelectasis or infectious infiltrate. 2. No acute abnormality is identified within the abdomen or pelvis. **This report has been created using voice recognition software. It may contain minor errors which are inherent in voice recognition technology. ** Final report electronically signed by Dr. Kimberly Borja on 12/14/2017 3:40 PM        EKG: Normal sinus rhythm  Possible Left atrial enlargement      Assessment / Plan:    1. PNA (pneumonia)-acute on chronic hypercarbic,hypoxic resp failure-  gentle hydration, Rocephin, Vanco, duonebs prn, azithro, f/u cultures  2. [facility]  3. Benign essential HTN- resume home meds with parameters to hold  4. CAD (coronary artery disease)- NTG sl prn, Lopressor, Lisinopril, ASA  5. Peripheral vascular disease (HCC)\  6. Insulin dependent type 2 diabetes mellitus, uncontrolled (HCC)-SSI, HgA1C  7. Tobacco abuse- cessation counseling  8. Complex regional pain syndrome type 1 of both lower extremities  9. Acute cystitis- Rocephin, await cultures  10. Pleural mass- Pulm consult- thinks its pseudo- trapped lung  11.    NSTEMI- on heprain gtt, acs protocol- cardio seeing              DVT prophylaxis: [] Lovenox                                 [] SCDs                                 [] SQ Heparin                                 [] Encourage ambulation, low risk for DVT, no chemical or mechanical prophylaxis necessary              [x] Already on Anticoagulation                Anticipated Disposition upon discharge: [] Home

## 2017-12-16 NOTE — PROGRESS NOTES
Acct: [de-identified]  Admit Date:  12/14/2017  Primary Cardiologist: Seen by Dr Evy Hensley this admission    Chief Complaint/Reason for Consultation: Elevated Troponin    Subjective (Events in last 24 hours):   Pt somnolent today. Denies any CP, SOB or palpitations.         Objective:   /70   Pulse 72   Temp 98.8 °F (37.1 °C) (Axillary)   Resp 18   Ht 5' 5\" (1.651 m)   Wt 179 lb 4.8 oz (81.3 kg)   LMP  (LMP Unknown)   SpO2 95%   BMI 29.84 kg/m²        TELEMETRY: NSR    Physical Exam:  General Appearance: somnolent  Cardiovascular: normal rate, regular rhythm, normal S1 and S2, no murmurs, rubs, clicks, or gallops, distal pulses intact, no carotid bruits, no JVD  Pulmonary/Chest: clear to auscultation bilaterally- no wheezes, rales or rhonchi, normal air movement, no respiratory distress  Abdomen: soft, non-tender, non-distended, normal bowel sounds, no masses Extremities: no cyanosis, clubbing or edema, pulse   Skin: warm and dry, no rash or erythema  Head: normocephalic and atraumatic  Eyes: pupils equal, round, and reactive to light  Neck: supple and non-tender without mass, no thyromegaly   Musculoskeletal: normal range of motion, no joint swelling, deformity or tenderness  Neurological: awakens and moves all ext  Medications:    vancomycin (VANCOCIN) intermittent dosing (placeholder)   Other RX Placeholder    vancomycin  500 mg Intravenous Q18H    vancomycin  1,000 mg Intravenous Q18H    ipratropium-albuterol  1 ampule Inhalation Q4H WA    aspirin  81 mg Oral Daily    clopidogrel  75 mg Oral Daily    atorvastatin  40 mg Oral Nightly    azithromycin  250 mg Oral Q24H    insulin glargine  28 Units Subcutaneous Daily    metoprolol tartrate  25 mg Oral BID    pregabalin  75 mg Oral Q6H    QUEtiapine  300 mg Oral Nightly    cefTRIAXone (ROCEPHIN) IV  1 g Intravenous Q24H    insulin lispro  0-12 Units Subcutaneous TID WC    insulin lispro  0-6 Units Subcutaneous Nightly    sodium chloride flush 10 mL Intravenous 2 times per day    famotidine  20 mg Oral BID    DULoxetine  30 mg Oral Daily      dextrose 5 % and 0.9 % NaCl      heparin (porcine) 18 Units/kg/hr (12/15/17 2159)       ipratropium-albuterol 1 ampule Q4H PRN   ALPRAZolam 1 mg TID PRN   oxyCODONE-acetaminophen 1 tablet Q6H PRN   dextrose 5 % and 0.9 % NaCl  PRN   glucose 15 g PRN   dextrose 12.5 g PRN   glucagon (rDNA) 1 mg PRN   sodium chloride flush 10 mL PRN   acetaminophen 650 mg Q4H PRN   magnesium hydroxide 30 mL Daily PRN   ondansetron 4 mg Q6H PRN   heparin (porcine) 80 Units/kg PRN   heparin (porcine) 40 Units/kg PRN   nitroGLYCERIN 0.4 mg Q5 Min PRN       Diagnostics:  EKG:  NSR T wave inversion in lateral leads resolved    Echo: 12/15/2017  Conclusions      Summary   Normal left ventricle size and systolic function.   Ejection fraction was estimated at 55 to 60 %.   There were no regional left ventricular wall motion abnormalities .   Moderately increased left ventricle wall thickness.   Moderate concentric left ventricular hypertrophy.   Doppler parameters were consistent with abnormal left ventricular   relaxation (grade 1 diastolic dysfunction).   The left atrium is Mildly dilated.   There is moderate Left pleural effusion. Lab Data:    Cardiac Enzymes:  No results for input(s): CKTOTAL, CKMB, CKMBINDEX, TROPONINI in the last 72 hours.     CBC:   Lab Results   Component Value Date    WBC 13.8 12/15/2017    RBC 4.00 12/15/2017    RBC 4.60 06/07/2012    HGB 12.5 12/15/2017    HCT 38.6 12/15/2017     12/15/2017       CMP:  Lab Results   Component Value Date     12/15/2017    K 4.5 12/15/2017     12/15/2017    CO2 25 12/15/2017    BUN 17 12/15/2017    CREATININE 0.9 12/15/2017    AGRATIO 1.6 12/13/2015    LABGLOM 64 12/15/2017    GLUCOSE 288 12/15/2017    GLUCOSE 262 12/13/2015    CALCIUM 8.4 12/15/2017       Hepatic Function Panel:  Lab Results   Component Value Date    ALKPHOS 84 12/14/2017    ALT 7 12/14/2017    AST 10 12/14/2017    PROT 6.2 12/15/2017    BILITOT 0.6 12/14/2017    BILIDIR 0.1 12/13/2015    LABALBU 3.7 12/14/2017    LABALBU 4.5 06/07/2012       Magnesium:    Lab Results   Component Value Date    MG 2.0 08/17/2015       PT/INR:    Lab Results   Component Value Date    PROTIME 10.0 12/09/2016    INR 0.91 01/17/2017       HgBA1c:    Lab Results   Component Value Date    LABA1C 8.4 12/14/2017       FLP:  Lab Results   Component Value Date    TRIG 282 08/08/2015    HDL 39 08/08/2015    LDLCALC SEE BELOW 03/03/2014    LDLDIRECT 143 08/08/2015       TSH:    Lab Results   Component Value Date    TSH 6.00 08/16/2015         Assessment:  · Left sided PNA  · Probable parapneumonic effusion  · Elevated Troponin  · H/O CABG  · H/O CVA  · Type II DM  · HTN  · HLD      Plan:  · Continue heparin for 24 more hours  · Continue BB/statin/ASA/plavix  · Check ABG  · OK to hold Heparin for thoracentesis  · Functional study when stable (can be done prior to discharge)         Electronically signed by Sly Brody PA-C on 12/16/2017 at 10:57 AM

## 2017-12-16 NOTE — PROGRESS NOTES
Metaneb therapy was given to the patient. Vitals before treatment:    heart rate 75   respiratory rate 24  Breath sounds before treatment:  diminished  Medication:  Medication delivered via Metaneb was Duoneb. Continuous High Frequency Oscillation (CHFO)   CHFO was applied to patient. Continuous Positive Expiratory Pressure (CPEP)  CPEP was applied to patient. Vitals during treatment:   heart rate 74   respiratory rate 24  Duration:    Therapy was given for 10 minutes. Tolerance:   Patient tolerated the procedure well.    Vitals after treatment:    heart rate 76   respiratory rate 24  Breath sounds after treatment:  diminished breath sounds- throughout  Cough/sputum:  no cough

## 2017-12-17 ENCOUNTER — APPOINTMENT (OUTPATIENT)
Dept: CT IMAGING | Age: 57
DRG: 193 | End: 2017-12-17
Payer: MEDICARE

## 2017-12-17 ENCOUNTER — APPOINTMENT (OUTPATIENT)
Dept: GENERAL RADIOLOGY | Age: 57
DRG: 193 | End: 2017-12-17
Payer: MEDICARE

## 2017-12-17 LAB
ALBUMIN SERPL-MCNC: 2.2 G/DL (ref 3.5–5.1)
ALLEN TEST: NORMAL
ALP BLD-CCNC: 153 U/L (ref 38–126)
ALT SERPL-CCNC: 19 U/L (ref 11–66)
ANION GAP SERPL CALCULATED.3IONS-SCNC: 12 MEQ/L (ref 8–16)
APTT: 68.6 SECONDS (ref 22–38)
APTT: 72.2 SECONDS (ref 22–38)
AST SERPL-CCNC: 30 U/L (ref 5–40)
BASE EXCESS (CALCULATED): 1.4 MMOL/L (ref -2.5–2.5)
BASOPHILS # BLD: 0.4 %
BASOPHILS ABSOLUTE: 0 THOU/MM3 (ref 0–0.1)
BILIRUB SERPL-MCNC: 0.3 MG/DL (ref 0.3–1.2)
BUN BLDV-MCNC: 25 MG/DL (ref 7–22)
CALCIUM SERPL-MCNC: 8.4 MG/DL (ref 8.5–10.5)
CHLORIDE BLD-SCNC: 102 MEQ/L (ref 98–111)
CO2: 25 MEQ/L (ref 23–33)
COLLECTED BY:: NORMAL
CREAT SERPL-MCNC: 1.4 MG/DL (ref 0.4–1.2)
DEVICE: NORMAL
EOSINOPHIL # BLD: 0.7 %
EOSINOPHILS ABSOLUTE: 0.1 THOU/MM3 (ref 0–0.4)
GFR SERPL CREATININE-BSD FRML MDRD: 39 ML/MIN/1.73M2
GLUCOSE BLD-MCNC: 140 MG/DL (ref 70–108)
GLUCOSE BLD-MCNC: 152 MG/DL (ref 70–108)
GLUCOSE BLD-MCNC: 189 MG/DL (ref 70–108)
GLUCOSE BLD-MCNC: 210 MG/DL (ref 70–108)
GLUCOSE BLD-MCNC: 248 MG/DL (ref 70–108)
HCO3: 26 MMOL/L (ref 23–28)
HCT VFR BLD CALC: 34.4 % (ref 37–47)
HEMOGLOBIN: 11.6 GM/DL (ref 12–16)
IFIO2: 6
LACTIC ACID: 0.7 MMOL/L (ref 0.5–2.2)
LEGIONELLA URINARY AG: NEGATIVE
LYMPHOCYTES # BLD: 11.4 %
LYMPHOCYTES ABSOLUTE: 1.1 THOU/MM3 (ref 1–4.8)
MCH RBC QN AUTO: 32.3 PG (ref 27–31)
MCHC RBC AUTO-ENTMCNC: 33.7 GM/DL (ref 33–37)
MCV RBC AUTO: 95.8 FL (ref 81–99)
MONOCYTES # BLD: 10 %
MONOCYTES ABSOLUTE: 1 THOU/MM3 (ref 0.4–1.3)
MRSA SCREEN: NORMAL
NUCLEATED RED BLOOD CELLS: 0 /100 WBC
O2 SATURATION: 94 %
ORGANISM: ABNORMAL
PCO2: 41 MMHG (ref 35–45)
PDW BLD-RTO: 13.5 % (ref 11.5–14.5)
PH BLOOD GAS: 7.41 (ref 7.35–7.45)
PLATELET # BLD: 164 THOU/MM3 (ref 130–400)
PMV BLD AUTO: 9.5 MCM (ref 7.4–10.4)
PO2: 71 MMHG (ref 71–104)
POTASSIUM SERPL-SCNC: 3.9 MEQ/L (ref 3.5–5.2)
PROCALCITONIN: 0.29 NG/ML (ref 0.01–0.09)
RBC # BLD: 3.59 MILL/MM3 (ref 4.2–5.4)
SEG NEUTROPHILS: 77.5 %
SEGMENTED NEUTROPHILS ABSOLUTE COUNT: 7.6 THOU/MM3 (ref 1.8–7.7)
SODIUM BLD-SCNC: 139 MEQ/L (ref 135–145)
SOURCE, BLOOD GAS: NORMAL
STREP PNEUMO AG, UR: NEGATIVE
TOTAL PROTEIN: 6.2 G/DL (ref 6.1–8)
URINE CULTURE REFLEX: ABNORMAL
URINE CULTURE REFLEX: ABNORMAL
VANCOMYCIN TROUGH: 26.5 UG/ML (ref 5–15)
VANCOMYCIN TROUGH: 30.3 UG/ML (ref 5–15)
VRE CULTURE: NORMAL
WBC # BLD: 9.8 THOU/MM3 (ref 4.8–10.8)

## 2017-12-17 PROCEDURE — 6370000000 HC RX 637 (ALT 250 FOR IP): Performed by: INTERNAL MEDICINE

## 2017-12-17 PROCEDURE — 2580000003 HC RX 258: Performed by: FAMILY MEDICINE

## 2017-12-17 PROCEDURE — 6360000002 HC RX W HCPCS: Performed by: PHARMACIST

## 2017-12-17 PROCEDURE — 6370000000 HC RX 637 (ALT 250 FOR IP): Performed by: NURSE PRACTITIONER

## 2017-12-17 PROCEDURE — APPNB30 APP NON BILLABLE TIME 0-30 MINS: Performed by: NURSE PRACTITIONER

## 2017-12-17 PROCEDURE — 36415 COLL VENOUS BLD VENIPUNCTURE: CPT

## 2017-12-17 PROCEDURE — 36600 WITHDRAWAL OF ARTERIAL BLOOD: CPT

## 2017-12-17 PROCEDURE — C1769 GUIDE WIRE: HCPCS

## 2017-12-17 PROCEDURE — 83605 ASSAY OF LACTIC ACID: CPT

## 2017-12-17 PROCEDURE — 6360000002 HC RX W HCPCS: Performed by: FAMILY MEDICINE

## 2017-12-17 PROCEDURE — 94669 MECHANICAL CHEST WALL OSCILL: CPT

## 2017-12-17 PROCEDURE — 77012 CT SCAN FOR NEEDLE BIOPSY: CPT

## 2017-12-17 PROCEDURE — 82948 REAGENT STRIP/BLOOD GLUCOSE: CPT

## 2017-12-17 PROCEDURE — 80053 COMPREHEN METABOLIC PANEL: CPT

## 2017-12-17 PROCEDURE — APPSS45 APP SPLIT SHARED TIME 31-45 MINUTES: Performed by: NURSE PRACTITIONER

## 2017-12-17 PROCEDURE — 2700000000 HC OXYGEN THERAPY PER DAY

## 2017-12-17 PROCEDURE — 94640 AIRWAY INHALATION TREATMENT: CPT

## 2017-12-17 PROCEDURE — 85025 COMPLETE CBC W/AUTO DIFF WBC: CPT

## 2017-12-17 PROCEDURE — C1729 CATH, DRAINAGE: HCPCS

## 2017-12-17 PROCEDURE — 0W9B30Z DRAINAGE OF LEFT PLEURAL CAVITY WITH DRAINAGE DEVICE, PERCUTANEOUS APPROACH: ICD-10-PCS | Performed by: RADIOLOGY

## 2017-12-17 PROCEDURE — 6370000000 HC RX 637 (ALT 250 FOR IP): Performed by: FAMILY MEDICINE

## 2017-12-17 PROCEDURE — 82803 BLOOD GASES ANY COMBINATION: CPT

## 2017-12-17 PROCEDURE — 84145 PROCALCITONIN (PCT): CPT

## 2017-12-17 PROCEDURE — 99232 SBSQ HOSP IP/OBS MODERATE 35: CPT | Performed by: INTERNAL MEDICINE

## 2017-12-17 PROCEDURE — 2140000000 HC CCU INTERMEDIATE R&B

## 2017-12-17 PROCEDURE — A6258 TRANSPARENT FILM >16<=48 IN: HCPCS

## 2017-12-17 PROCEDURE — 99231 SBSQ HOSP IP/OBS SF/LOW 25: CPT | Performed by: PHYSICIAN ASSISTANT

## 2017-12-17 PROCEDURE — 32551 INSERTION OF CHEST TUBE: CPT

## 2017-12-17 PROCEDURE — 99233 SBSQ HOSP IP/OBS HIGH 50: CPT | Performed by: INTERNAL MEDICINE

## 2017-12-17 PROCEDURE — 71010 XR CHEST PORTABLE: CPT

## 2017-12-17 PROCEDURE — 80202 ASSAY OF VANCOMYCIN: CPT

## 2017-12-17 PROCEDURE — C1894 INTRO/SHEATH, NON-LASER: HCPCS

## 2017-12-17 PROCEDURE — 85730 THROMBOPLASTIN TIME PARTIAL: CPT

## 2017-12-17 RX ORDER — VANCOMYCIN HYDROCHLORIDE 1 G/200ML
1000 INJECTION, SOLUTION INTRAVENOUS EVERY 24 HOURS
Status: DISCONTINUED | OUTPATIENT
Start: 2017-12-17 | End: 2017-12-18

## 2017-12-17 RX ORDER — PREGABALIN 75 MG/1
75 CAPSULE ORAL EVERY 6 HOURS
Status: DISCONTINUED | OUTPATIENT
Start: 2017-12-17 | End: 2017-12-19

## 2017-12-17 RX ORDER — OXYCODONE HYDROCHLORIDE AND ACETAMINOPHEN 5; 325 MG/1; MG/1
1 TABLET ORAL EVERY 8 HOURS PRN
Status: DISCONTINUED | OUTPATIENT
Start: 2017-12-17 | End: 2017-12-21 | Stop reason: HOSPADM

## 2017-12-17 RX ORDER — NICOTINE 21 MG/24HR
1 PATCH, TRANSDERMAL 24 HOURS TRANSDERMAL DAILY
Status: DISCONTINUED | OUTPATIENT
Start: 2017-12-17 | End: 2017-12-21 | Stop reason: HOSPADM

## 2017-12-17 RX ADMIN — PREGABALIN 75 MG: 75 CAPSULE ORAL at 17:08

## 2017-12-17 RX ADMIN — PREGABALIN 75 MG: 75 CAPSULE ORAL at 23:32

## 2017-12-17 RX ADMIN — OXYCODONE HYDROCHLORIDE AND ACETAMINOPHEN 1 TABLET: 5; 325 TABLET ORAL at 16:00

## 2017-12-17 RX ADMIN — CLOPIDOGREL 75 MG: 75 TABLET, FILM COATED ORAL at 07:40

## 2017-12-17 RX ADMIN — AZITHROMYCIN 250 MG: 250 TABLET, FILM COATED ORAL at 16:00

## 2017-12-17 RX ADMIN — FAMOTIDINE 20 MG: 20 TABLET, FILM COATED ORAL at 07:40

## 2017-12-17 RX ADMIN — IPRATROPIUM BROMIDE AND ALBUTEROL SULFATE 1 AMPULE: .5; 3 SOLUTION RESPIRATORY (INHALATION) at 12:12

## 2017-12-17 RX ADMIN — Medication 1 UNITS: at 21:03

## 2017-12-17 RX ADMIN — PREGABALIN 75 MG: 75 CAPSULE ORAL at 10:19

## 2017-12-17 RX ADMIN — INSULIN LISPRO 4 UNITS: 100 INJECTION, SOLUTION INTRAVENOUS; SUBCUTANEOUS at 12:05

## 2017-12-17 RX ADMIN — IPRATROPIUM BROMIDE AND ALBUTEROL SULFATE 1 AMPULE: .5; 3 SOLUTION RESPIRATORY (INHALATION) at 15:44

## 2017-12-17 RX ADMIN — METOPROLOL TARTRATE 25 MG: 25 TABLET ORAL at 23:32

## 2017-12-17 RX ADMIN — ASPIRIN 81 MG: 81 TABLET, COATED ORAL at 07:40

## 2017-12-17 RX ADMIN — ATORVASTATIN CALCIUM 40 MG: 40 TABLET, FILM COATED ORAL at 21:02

## 2017-12-17 RX ADMIN — DULOXETINE HYDROCHLORIDE 30 MG: 30 CAPSULE, DELAYED RELEASE ORAL at 07:40

## 2017-12-17 RX ADMIN — INSULIN LISPRO 2 UNITS: 100 INJECTION, SOLUTION INTRAVENOUS; SUBCUTANEOUS at 16:58

## 2017-12-17 RX ADMIN — INSULIN LISPRO 4 UNITS: 100 INJECTION, SOLUTION INTRAVENOUS; SUBCUTANEOUS at 07:40

## 2017-12-17 RX ADMIN — INSULIN GLARGINE 28 UNITS: 100 INJECTION, SOLUTION SUBCUTANEOUS at 07:40

## 2017-12-17 RX ADMIN — VANCOMYCIN HYDROCHLORIDE 1000 MG: 1 INJECTION, SOLUTION INTRAVENOUS at 11:13

## 2017-12-17 RX ADMIN — IPRATROPIUM BROMIDE AND ALBUTEROL SULFATE 1 AMPULE: .5; 3 SOLUTION RESPIRATORY (INHALATION) at 19:47

## 2017-12-17 RX ADMIN — Medication 10 ML: at 21:02

## 2017-12-17 RX ADMIN — CEFTRIAXONE SODIUM 1 G: 10 INJECTION, POWDER, FOR SOLUTION INTRAVENOUS at 16:01

## 2017-12-17 RX ADMIN — FAMOTIDINE 20 MG: 20 TABLET, FILM COATED ORAL at 21:02

## 2017-12-17 RX ADMIN — METOPROLOL TARTRATE 25 MG: 25 TABLET ORAL at 07:40

## 2017-12-17 RX ADMIN — VANCOMYCIN HYDROCHLORIDE 1000 MG: 1 INJECTION, SOLUTION INTRAVENOUS at 23:33

## 2017-12-17 RX ADMIN — IPRATROPIUM BROMIDE AND ALBUTEROL SULFATE 1 AMPULE: .5; 3 SOLUTION RESPIRATORY (INHALATION) at 08:00

## 2017-12-17 RX ADMIN — HEPARIN SODIUM AND DEXTROSE 20 UNITS/KG/HR: 10000; 5 INJECTION INTRAVENOUS at 08:04

## 2017-12-17 RX ADMIN — Medication 10 ML: at 10:20

## 2017-12-17 ASSESSMENT — PAIN DESCRIPTION - LOCATION
LOCATION: OTHER (COMMENT)
LOCATION: LEG;FOOT

## 2017-12-17 ASSESSMENT — PAIN DESCRIPTION - DESCRIPTORS: DESCRIPTORS: ACHING

## 2017-12-17 ASSESSMENT — PAIN SCALES - GENERAL
PAINLEVEL_OUTOF10: 6
PAINLEVEL_OUTOF10: 4
PAINLEVEL_OUTOF10: 10
PAINLEVEL_OUTOF10: 8
PAINLEVEL_OUTOF10: 4

## 2017-12-17 ASSESSMENT — PAIN DESCRIPTION - PAIN TYPE
TYPE: CHRONIC PAIN
TYPE: ACUTE PAIN

## 2017-12-17 ASSESSMENT — PAIN DESCRIPTION - FREQUENCY: FREQUENCY: CONTINUOUS

## 2017-12-17 ASSESSMENT — PAIN DESCRIPTION - ORIENTATION
ORIENTATION: LEFT;LOWER
ORIENTATION: LEFT

## 2017-12-17 NOTE — PROGRESS NOTES
progress    Patient:  Sherin Brown  YOB: 1960  Date of Service: 12/17/2017  MRN: [id number]   Acct:  [de-identified]   Primary Care Physician: Deepa Mann MD    Chief Complaint: Abdominal pain, Back pain    History of Present Illness:   History obtained from chart review and the patient. The patient is a 61 y.o. female with h/o Anxiety; PVD, S/p right BKA, CAD, s/p CABG, s/p CVA,  Diabetes, Chronic back pain; Chronic pain; Depression; Fibromyalgia; Hyperlipidemia; Hypertension;  Neuropathy, RSD, Schizoaffective disorder, Stroke with Receptive aphasia who presents to the Emergency Department for the evaluation of abdominal and back pain. Patient is not able to give any history and daughter is no longer at the bedside. Per ED chart review, her abdominal pain started yesterday,  is intermittent and is made worse when she moves. Patient is not ambulating and uses wheelchair to get around. She does have home health nurses provide periodic care at her home. Per nursing staff on the medical floor, patient's daughter notified her that patient had taken her home percocet and Metformin in the ED without notifying ED staff. Patient's daughter also states that patient is prone to over medicating herself with her pain med. Patient shows me her amputated limb when I asked if she had any pain. Patient was noted to have complained of chest pain yesterday and was given Nitro by home health nurse. Patient's troponin is elevated at 0.020 and it is being trended. Patient was hypotensive with SBP- 81/66 so fluid bolus was given. Heparin infusion has been started and Cardiology consulted.           Subjective:-  Off bipap  Getting thoracentesis today  Spoke to dr April Lantigua    Scheduled Meds:   pregabalin  75 mg Oral Q6H    nicotine  1 patch Transdermal Daily    vancomycin  1,000 mg Intravenous Q24H    vancomycin (VANCOCIN) intermittent dosing (placeholder)   Other RX Placeholder    Protein 6.2 6.1 - 8.0 g/dL    Alb 2.2 (L) 3.5 - 5.1 g/dL    Total Bilirubin 0.3 0.3 - 1.2 mg/dL    ALT 19 11 - 66 U/L   Anion Gap    Collection Time: 12/17/17  4:28 AM   Result Value Ref Range    Anion Gap 12.0 8.0 - 16.0 meq/L   Glomerular Filtration Rate, Estimated    Collection Time: 12/17/17  4:28 AM   Result Value Ref Range    Est, Glom Filt Rate 39 (A) ml/min/1.73m2   POCT Glucose    Collection Time: 12/17/17  6:05 AM   Result Value Ref Range    POC Glucose 248 (H) 70 - 108 mg/dl   Blood Gas, Arterial    Collection Time: 12/17/17  8:24 AM   Result Value Ref Range    pH, Blood Gas 7.41 7.35 - 7.45    PCO2 41 35 - 45 mmhg    PO2 71 71 - 104 mmhg    HCO3 26 23 - 28 mmol/l    Base Excess (Calculated) 1.4 -2.5 - 2.5 mmol/l    O2 Sat 94 %    IFIO2 6     DEVICE Cannula     Alex Test N/A     Source: L Brach     COLLECTED BY: [id number]    APTT    Collection Time: 12/17/17 10:39 AM   Result Value Ref Range    aPTT 68.6 (H) 22.0 - 38.0 seconds   Vancomycin, trough    Collection Time: 12/17/17 10:39 AM   Result Value Ref Range    Vancomycin Tr 26.5 (HH) 5.0 - 15.0 ug/mL   POCT Glucose    Collection Time: 12/17/17 11:36 AM   Result Value Ref Range    POC Glucose 210 (H) 70 - 108 mg/dl       Radiology:     Ct Chest Wo Contrast    Result Date: 12/14/2017  PROCEDURE: CT CHEST WO CONTRAST CLINICAL INFORMATION: Left lung fluid collection TECHNIQUE: CT of the chest was performed without use of intravenous contrast. Axial images as well as coronal and sagittal reconstructions were obtained. All CT scans at this facility use dose modulation, iterative reconstruction, and/or weight-based dosing when appropriate to reduce radiation dose to as low as reasonably achievable. COMPARISON: None FINDINGS: The heart is mildly enlarged. Atherosclerotic calcifications are present in the thoracic aorta and coronary arteries without evidence of aneurysm. There is no pericardial effusion.  There is a small partially loculated left-sided pleural as a fluid collection at the posterior medial aspect. The fluid collection measures approximately 2.8 x 6.1 x 4.1 cm. The base of the heart is within appropriate limits. The liver is unremarkable. The spleen is unremarkable. There is redemonstration of a nodule along the lateral limb of the left adrenal gland which measures 2.4 x 2.7 x 2.4 cm which is stable from the prior exam and likely represents an adrenal adenoma. The pancreas is within normal limits. The gallbladder is surgically absent. No intra-or extrahepatic biliary ductal dilation. There is no hydronephrosis or stones of either kidney. No renal masses are noted. No ascites or free air. No other fluid collection. No abnormalities of the small or large bowel loops are noted. The appendix is within normal limits. The IVC and aorta are of normal caliber. There is no adenopathy. The urinary bladder is normal. The uterus and ovaries are surgically absent. There is no pelvic free fluid. There is no adenopathy. Degenerative changes are noted within the bilateral hips and spine. No suspicious osseous lesions are identified. 1. There is a fluid collection at the left lung base with overlying patchy density. The fluid collection may represent a loculated effusion versus empyema. Overlying patchy density may represent atelectasis or infectious infiltrate. 2. No acute abnormality is identified within the abdomen or pelvis. **This report has been created using voice recognition software. It may contain minor errors which are inherent in voice recognition technology. ** Final report electronically signed by Dr. Mariah Cronin on 12/14/2017 3:40 PM        EKG: Normal sinus rhythm  Possible Left atrial enlargement      Assessment / Plan:    1. PNA (pneumonia)-acute on chronic hypercarbic,hypoxic resp failure-  gentle hydration, Rocephin, Vanco, duonebs prn, azithro, f/u cultures  2. Schizoaffective disorder ([facility] treated  3.    Benign essential HTN-

## 2017-12-17 NOTE — PROGRESS NOTES
Acct: [de-identified]  Admit Date:  12/14/2017  Primary Cardiologist: Seen by Dr Aldair Constantino this admission    Chief Complaint/Reason for Consultation: Elevated Troponin    Subjective (Events in last 24 hours):   Pt more alert today. Occasional CP. Intermittent SOB. Having left thoracentesis today.         Objective:   BP (!) 160/70   Pulse 88   Temp 98.9 °F (37.2 °C) (Oral)   Resp 18   Ht 5' 5\" (1.651 m)   Wt 180 lb 6.4 oz (81.8 kg)   LMP  (LMP Unknown)   SpO2 93%   BMI 30.02 kg/m²        TELEMETRY: NSR    Physical Exam:  General Appearance: somnolent  Cardiovascular: normal rate, regular rhythm, normal S1 and S2, no murmurs, rubs, clicks, or gallops, distal pulses intact, no carotid bruits, no JVD  Pulmonary/Chest: decreased left breath sounds  Abdomen: soft, non-tender, non-distended, normal bowel sounds, no masses Extremities: no cyanosis, clubbing or edema, pulse   Skin: warm and dry, no rash or erythema  Head: normocephalic and atraumatic  Eyes: pupils equal, round, and reactive to light  Neck: supple and non-tender without mass, no thyromegaly   Musculoskeletal: normal range of motion, no joint swelling, deformity or tenderness  Neurological: awakens and moves all ext  Medications:    pregabalin  75 mg Oral Q6H    nicotine  1 patch Transdermal Daily    vancomycin  1,000 mg Intravenous Q24H    vancomycin (VANCOCIN) intermittent dosing (placeholder)   Other RX Placeholder    ipratropium-albuterol  1 ampule Inhalation Q4H WA    aspirin  81 mg Oral Daily    clopidogrel  75 mg Oral Daily    atorvastatin  40 mg Oral Nightly    azithromycin  250 mg Oral Q24H    insulin glargine  28 Units Subcutaneous Daily    metoprolol tartrate  25 mg Oral BID    cefTRIAXone (ROCEPHIN) IV  1 g Intravenous Q24H    insulin lispro  0-12 Units Subcutaneous TID WC    insulin lispro  0-6 Units Subcutaneous Nightly    sodium chloride flush  10 mL Intravenous 2 times per day    famotidine  20 mg Oral BID    DULoxetine  30 Magnesium:    Lab Results   Component Value Date    MG 2.0 08/17/2015       PT/INR:    Lab Results   Component Value Date    PROTIME 10.0 12/09/2016    INR 0.91 01/17/2017       HgBA1c:    Lab Results   Component Value Date    LABA1C 8.4 12/14/2017       FLP:    Lab Results   Component Value Date    TRIG 282 08/08/2015    HDL 39 08/08/2015    LDLCALC SEE BELOW 03/03/2014    LDLDIRECT 143 08/08/2015       TSH:    Lab Results   Component Value Date    TSH 6.00 08/16/2015         Assessment:  · Left sided PNA  · Probable parapneumonic effusion  · Elevated Troponin  · H/O CABG  · H/O CVA  · Type II DM  · HTN  · HLD      Plan:  · Continue BB/statin/ASA/plavix  · For thoracentesis today  · Functional study when stable (can be done prior to discharge)         Electronically signed by Randee Llanes PA-C on 12/17/2017 at 11:51 AM

## 2017-12-17 NOTE — PROGRESS NOTES
Metaneb therapy was given to the patient. Vitals before treatment:    heart rate 78   respiratory rate 22  Breath sounds before treatment:  normal  Medication:  Medication delivered via Metaneb was duonebs. Continuous High Frequency Oscillation (CHFO)   CHFO was applied to patient. Continuous Positive Expiratory Pressure (CPEP)  CPEP was applied to patient. Vitals during treatment:   heart rate 77   respiratory rate 22  Duration:    Therapy was given for 10 minutes. Tolerance:   Patient tolerated the procedure well.    Vitals after treatment:    heart rate 80   respiratory rate 18  Breath sounds after treatment:  normal  Cough/sputum:  nonproductive

## 2017-12-17 NOTE — PROGRESS NOTES
Metaneb therapy was given to the patient. Vitals before treatment:    heart rate 78   respiratory rate 20  Breath sounds before treatment:  normal  Medication:  Medication delivered via Metaneb was duoneb. Continuous High Frequency Oscillation (CHFO)   CHFO was applied to patient. Continuous Positive Expiratory Pressure (CPEP)  CPEP was applied to patient. Vitals during treatment:   heart rate 73   respiratory rate 22  Duration:    Therapy was given for 10 minutes. Tolerance:   Patient tolerated the procedure well.    Vitals after treatment:    heart rate 73   respiratory rate 18  Breath sounds after treatment:  normal  Cough/sputum:  nonproductive

## 2017-12-17 NOTE — PLAN OF CARE
Problem: Impaired respiratory status  Goal: Clear lung sounds  Outcome: Ongoing  Pt refused metaneb saying it makes it too hard to breathe. Duoneb was given by aerosol tx. Breath sounds clear and diminished.

## 2017-12-17 NOTE — PROGRESS NOTES
[place] [facility]    Patient - Sherin Brown   MRN -  [id number]   [facility] - [de-identified]   - 1960      Date of Admission -  2017 12:12 PM  Date of evaluation -  2017  Room - 3B--A   Hospital Day -  [address], [place] Primary Care Physician - Deepa Mann MD   Chief Complaint   ? Lung mass; pneumonia. Active Hospital Problem List      Active Hospital Problems    Diagnosis Date Noted    Generalized abdominal pain [R10.84]     Coronary artery disease involving native coronary artery of native heart without angina pectoris [I25.10]     Acute hypercapnic respiratory failure (HCC) [J96.02]     Pleural effusion, left [J90]     Pneumonia due to organism [J18.9] 2017    Acute cystitis [N30.00] 2017    Pleural mass [J94.9] 2017    Elevated troponin [R74.8] 2017    Hypoxia [R09.02] 2017    Complex regional pain syndrome type 1 of both lower extremities [G90.523]     Tobacco abuse [Z72.0] 2016    Insulin dependent type 2 diabetes mellitus, uncontrolled ([facility] [E11.65, Z79.4] 2015    Peripheral vascular disease ([facility] [I73.9]     CAD (coronary artery disease) [I25.10] 10/29/2014    Benign essential HTN [I10] 10/29/2014    Schizoaffective disorder ([facility] [F25.9] 2014     HPI   Sherin Brown is a 61 y.o. female admitted for abd and back pain;   Pt lives at home alone and has [facility]; she states that she had a 1 day hx of abd and mid back pain; pt states HH RN gave her a NTG SL for c/o chest pain and EMS was called; pt does have chronic back pain and follows with Dr Sharri Roa; pt has a hx of CVA with aphasia so a through hx is difficult to obtain from pt and no family at bedside; she states she smokes 1 PPD; denies any home use of 02.     She states she has had a non productive cough \"off and on\"; she states she has had some shortness of breath; denies any previous hx of lung problems; she was hypoxic at 89% on arrival to ED; she did dev a temp of 100.1 at 0431 today; she had a CTA chest and CT WO chest that showed a LLL pneumonia along with a loculated pleural effusion on the left side; ABG's showed pH 7.31; PC02 61 and p02 61; she was placed on Bi-pap at 14/6; she states she is feeling better now than on arrival.      Past 24 hour events   Awake and alert today after holding Xanax, Seroquel, Lyrica and Percocet  Continues to tolerate Bipap  91-93% on 6 Liters (desats easily)  CXR shows increased effusion on Left  Complains of SOB but no acute distress  Cultures negative to date except for urine is positive for e-coli  Complains of neuropathic pain to legs  Mild LUQ abdominal pain  Continues on IV Vanco, Rocephin, Zithromax     Meds    Current Medications    pregabalin  75 mg Oral Q6H    nicotine  1 patch Transdermal Daily    vancomycin (VANCOCIN) intermittent dosing (placeholder)   Other RX Placeholder    vancomycin  500 mg Intravenous Q18H    vancomycin  1,000 mg Intravenous Q18H    ipratropium-albuterol  1 ampule Inhalation Q4H WA    aspirin  81 mg Oral Daily    clopidogrel  75 mg Oral Daily    atorvastatin  40 mg Oral Nightly    azithromycin  250 mg Oral Q24H    insulin glargine  28 Units Subcutaneous Daily    metoprolol tartrate  25 mg Oral BID    cefTRIAXone (ROCEPHIN) IV  1 g Intravenous Q24H    insulin lispro  0-12 Units Subcutaneous TID WC    insulin lispro  0-6 Units Subcutaneous Nightly    sodium chloride flush  10 mL Intravenous 2 times per day    famotidine  20 mg Oral BID    DULoxetine  30 mg Oral Daily     ipratropium-albuterol, dextrose 5 % and 0.9 % NaCl, glucose, dextrose, glucagon (rDNA), sodium chloride flush, acetaminophen, magnesium hydroxide, ondansetron, heparin (porcine), heparin (porcine), nitroGLYCERIN  IV Drips/Infusions   dextrose 5 % and 0.9 % NaCl      heparin (porcine) 20 Units/kg/hr (12/17/17 0804)     Vitals    Vitals    height is 5' consistent with abnormal left ventricular   relaxation (grade 1 diastolic dysfunction).   The left atrium is Mildly dilated.   There is moderate Left pleural effusions      Mitral Valve   The mitral valve structure was normal with normal leaflet separation.   DOPPLER: The transmitral velocity was within the normal range with no   evidence for mitral stenosis. Trace mitral regurgitation is present.      Aortic Valve   The aortic valve was trileaflet with normal thickness and cuspal   separation. DOPPLER: Transaortic velocity was within the normal range with   no evidence of aortic stenosis. There was no evidence of aortic   regurgitation.      Tricuspid Valve   The tricuspid valve structure was normal with normal leaflet separation.   DOPPLER: There was no evidence of tricuspid stenosis.   Trivial tricuspid regurgitation visualized.      Pulmonic Valve   The pulmonic valve leaflets exhibited normal thickness, no calcification,   and normal cuspal separation. DOPPLER: The transpulmonic velocity was   within the normal range with no evidence for regurgitation.      Left Atrium   The left atrium is Mildly dilated.      Left Ventricle   Normal left ventricle size and systolic function. Ejection fraction was   estimated at 55 to 60 %.  There were no regional left ventricular wall   motion abnormalities .   Moderately increased left ventricle wall thickness.   Moderate concentric left ventricular hypertrophy.   Doppler parameters were consistent with abnormal left ventricular   relaxation (grade 1 diastolic dysfunction).      Right Atrium   Right atrial size was normal.      Right Ventricle   The right ventricular size was normal with normal systolic function and   wall thickness.      Pericardial Effusion   The pericardium was normal in appearance with no evidence of a pericardial   effusion.   There is moderate Left pleural effusion.      Pleural Effusion   No evidence of pleural effusion.       Cultures Procalcitonin  Lab Results   Component Value Date    PROCAL 0.64 12/14/2017    PROCAL 0.06 01/10/2015     Influenza A/B (-)  Blood cx x 2 (-) prelim. Urine: e-coli  Legionella/strep antigen negative  MRSA screening: negative  VRE screening: negative     Radiology    CXR    CXR 12/17/17   Large left pleural effusion. Underlying volume loss likely, cannot exclude pneumonia. Medial right basilar atelectasis. Cardiomegaly status post CABG surgery. CTA chest: 12/15/17  FINDINGS:   Lungs: There is bilateral multilobar atelectasis. There is left lower lobe consolidation felt to represent passive atelectasis related to the effusion. A component of pneumonia in the left lower lobe is not excluded. .       Pleura: There is a moderate multiloculated left pleural effusion. There is no pneumothorax.       Heart: There is mild cardiomegaly. Patient is status post sternotomy for CABG surgery.       Pulmonary vasculature: There is adequate opacification of the pulmonary arteries. There is no pulmonary embolism.       Bella and mediastinum: There is mild subcarinal adenopathy. No hilar adenopathy. There are left hilar calcified lymph nodes.       Thoracic aorta/vascular:? There is no thoracic aortic aneurysm or dissection. There is a probable severe stenosis of the origin of the left common carotid artery. .       Imaged upper abdomen: There is a splenic calcified granuloma. Remainder the imaged upper abdomen is unremarkable.       Musculoskeletal system: There is bilateral glenohumeral joint DJD. There is a nearly 1 cm well-defined corticated calcification anteromedial to the left glenohumeral joint likely representing an intra-articular loose body. There are multilevel bridging    thoracic vertebral endplate osteophytes consistent with DISH.       Chest/body wall soft tissues: Unremarkable       Thyroid: Unremarkable       Impression       No acute pulmonary embolism. Moderate multiloculated left pleural effusion.

## 2017-12-17 NOTE — PROGRESS NOTES
Formulation and discussion of sedation / procedure plans, risks, benefits, side effects and alternatives with patient and/or responsible adult completed.     Electronically signed by Darrick Stephens MD on 12/17/2017 at 3:15 PM

## 2017-12-18 ENCOUNTER — APPOINTMENT (OUTPATIENT)
Dept: GENERAL RADIOLOGY | Age: 57
DRG: 193 | End: 2017-12-18
Payer: MEDICARE

## 2017-12-18 LAB
ANION GAP SERPL CALCULATED.3IONS-SCNC: 14 MEQ/L (ref 8–16)
BUN BLDV-MCNC: 18 MG/DL (ref 7–22)
CALCIUM SERPL-MCNC: 9 MG/DL (ref 8.5–10.5)
CHLORIDE BLD-SCNC: 100 MEQ/L (ref 98–111)
CO2: 24 MEQ/L (ref 23–33)
CREAT SERPL-MCNC: 1 MG/DL (ref 0.4–1.2)
GFR SERPL CREATININE-BSD FRML MDRD: 57 ML/MIN/1.73M2
GLUCOSE BLD-MCNC: 159 MG/DL (ref 70–108)
GLUCOSE BLD-MCNC: 193 MG/DL (ref 70–108)
GLUCOSE BLD-MCNC: 230 MG/DL (ref 70–108)
GLUCOSE BLD-MCNC: 247 MG/DL (ref 70–108)
GLUCOSE BLD-MCNC: 257 MG/DL (ref 70–108)
GLUCOSE, FLUID: 146 MG/DL
HCT VFR BLD CALC: 35.7 % (ref 37–47)
HEMOGLOBIN: 11.9 GM/DL (ref 12–16)
LD, FLUID: 880 U/L
LD: 228 U/L (ref 100–190)
MCH RBC QN AUTO: 31.8 PG (ref 27–31)
MCHC RBC AUTO-ENTMCNC: 33.4 GM/DL (ref 33–37)
MCV RBC AUTO: 95.3 FL (ref 81–99)
PDW BLD-RTO: 13.5 % (ref 11.5–14.5)
PH FLUID: 8.43
PLATELET # BLD: 179 THOU/MM3 (ref 130–400)
PMV BLD AUTO: 9.6 MCM (ref 7.4–10.4)
POTASSIUM SERPL-SCNC: 3.8 MEQ/L (ref 3.5–5.2)
PROTEIN FLUID: 3.3 GM/DL
RBC # BLD: 3.75 MILL/MM3 (ref 4.2–5.4)
SODIUM BLD-SCNC: 138 MEQ/L (ref 135–145)
TOTAL PROTEIN: 6.4 G/DL (ref 6.1–8)
WBC # BLD: 8.9 THOU/MM3 (ref 4.8–10.8)

## 2017-12-18 PROCEDURE — 6370000000 HC RX 637 (ALT 250 FOR IP): Performed by: INTERNAL MEDICINE

## 2017-12-18 PROCEDURE — 97110 THERAPEUTIC EXERCISES: CPT

## 2017-12-18 PROCEDURE — 6370000000 HC RX 637 (ALT 250 FOR IP): Performed by: NURSE PRACTITIONER

## 2017-12-18 PROCEDURE — 87102 FUNGUS ISOLATION CULTURE: CPT

## 2017-12-18 PROCEDURE — 2580000003 HC RX 258: Performed by: FAMILY MEDICINE

## 2017-12-18 PROCEDURE — 82948 REAGENT STRIP/BLOOD GLUCOSE: CPT

## 2017-12-18 PROCEDURE — 87075 CULTR BACTERIA EXCEPT BLOOD: CPT

## 2017-12-18 PROCEDURE — G8987 SELF CARE CURRENT STATUS: HCPCS

## 2017-12-18 PROCEDURE — 99233 SBSQ HOSP IP/OBS HIGH 50: CPT | Performed by: INTERNAL MEDICINE

## 2017-12-18 PROCEDURE — 97162 PT EVAL MOD COMPLEX 30 MIN: CPT

## 2017-12-18 PROCEDURE — 97530 THERAPEUTIC ACTIVITIES: CPT

## 2017-12-18 PROCEDURE — 92526 ORAL FUNCTION THERAPY: CPT

## 2017-12-18 PROCEDURE — 94640 AIRWAY INHALATION TREATMENT: CPT

## 2017-12-18 PROCEDURE — G8978 MOBILITY CURRENT STATUS: HCPCS

## 2017-12-18 PROCEDURE — 87070 CULTURE OTHR SPECIMN AEROBIC: CPT

## 2017-12-18 PROCEDURE — 89051 BODY FLUID CELL COUNT: CPT

## 2017-12-18 PROCEDURE — 99232 SBSQ HOSP IP/OBS MODERATE 35: CPT | Performed by: INTERNAL MEDICINE

## 2017-12-18 PROCEDURE — G8988 SELF CARE GOAL STATUS: HCPCS

## 2017-12-18 PROCEDURE — 84155 ASSAY OF PROTEIN SERUM: CPT

## 2017-12-18 PROCEDURE — 71010 XR CHEST PORTABLE: CPT

## 2017-12-18 PROCEDURE — 6370000000 HC RX 637 (ALT 250 FOR IP): Performed by: FAMILY MEDICINE

## 2017-12-18 PROCEDURE — 6360000002 HC RX W HCPCS: Performed by: NURSE PRACTITIONER

## 2017-12-18 PROCEDURE — 87206 SMEAR FLUORESCENT/ACID STAI: CPT

## 2017-12-18 PROCEDURE — APPSS60 APP SPLIT SHARED TIME 46-60 MINUTES: Performed by: NURSE PRACTITIONER

## 2017-12-18 PROCEDURE — 99231 SBSQ HOSP IP/OBS SF/LOW 25: CPT | Performed by: NURSE PRACTITIONER

## 2017-12-18 PROCEDURE — 83615 LACTATE (LD) (LDH) ENZYME: CPT

## 2017-12-18 PROCEDURE — 97166 OT EVAL MOD COMPLEX 45 MIN: CPT

## 2017-12-18 PROCEDURE — 2700000000 HC OXYGEN THERAPY PER DAY

## 2017-12-18 PROCEDURE — 36415 COLL VENOUS BLD VENIPUNCTURE: CPT

## 2017-12-18 PROCEDURE — 87116 MYCOBACTERIA CULTURE: CPT

## 2017-12-18 PROCEDURE — 87205 SMEAR GRAM STAIN: CPT

## 2017-12-18 PROCEDURE — 2140000000 HC CCU INTERMEDIATE R&B

## 2017-12-18 PROCEDURE — 94669 MECHANICAL CHEST WALL OSCILL: CPT

## 2017-12-18 PROCEDURE — G8979 MOBILITY GOAL STATUS: HCPCS

## 2017-12-18 PROCEDURE — 6360000002 HC RX W HCPCS: Performed by: FAMILY MEDICINE

## 2017-12-18 PROCEDURE — 85027 COMPLETE CBC AUTOMATED: CPT

## 2017-12-18 PROCEDURE — 80048 BASIC METABOLIC PNL TOTAL CA: CPT

## 2017-12-18 RX ORDER — ROSUVASTATIN CALCIUM 10 MG/1
10 TABLET, COATED ORAL NIGHTLY
COMMUNITY

## 2017-12-18 RX ORDER — ALPRAZOLAM 1 MG/1
1 TABLET ORAL 4 TIMES DAILY PRN
COMMUNITY

## 2017-12-18 RX ADMIN — ASPIRIN 81 MG: 81 TABLET, COATED ORAL at 08:45

## 2017-12-18 RX ADMIN — PREGABALIN 75 MG: 75 CAPSULE ORAL at 11:40

## 2017-12-18 RX ADMIN — INSULIN LISPRO 4 UNITS: 100 INJECTION, SOLUTION INTRAVENOUS; SUBCUTANEOUS at 11:39

## 2017-12-18 RX ADMIN — PREGABALIN 75 MG: 75 CAPSULE ORAL at 20:46

## 2017-12-18 RX ADMIN — IPRATROPIUM BROMIDE AND ALBUTEROL SULFATE 1 AMPULE: .5; 3 SOLUTION RESPIRATORY (INHALATION) at 19:56

## 2017-12-18 RX ADMIN — Medication 10 ML: at 08:46

## 2017-12-18 RX ADMIN — OXYCODONE HYDROCHLORIDE AND ACETAMINOPHEN 1 TABLET: 5; 325 TABLET ORAL at 03:38

## 2017-12-18 RX ADMIN — AZITHROMYCIN 250 MG: 250 TABLET, FILM COATED ORAL at 17:09

## 2017-12-18 RX ADMIN — INSULIN LISPRO 6 UNITS: 100 INJECTION, SOLUTION INTRAVENOUS; SUBCUTANEOUS at 17:07

## 2017-12-18 RX ADMIN — IPRATROPIUM BROMIDE AND ALBUTEROL SULFATE 1 AMPULE: .5; 3 SOLUTION RESPIRATORY (INHALATION) at 13:11

## 2017-12-18 RX ADMIN — DULOXETINE HYDROCHLORIDE 30 MG: 30 CAPSULE, DELAYED RELEASE ORAL at 08:45

## 2017-12-18 RX ADMIN — METOPROLOL TARTRATE 25 MG: 25 TABLET ORAL at 20:46

## 2017-12-18 RX ADMIN — METOPROLOL TARTRATE 25 MG: 25 TABLET ORAL at 08:45

## 2017-12-18 RX ADMIN — PREGABALIN 75 MG: 75 CAPSULE ORAL at 17:09

## 2017-12-18 RX ADMIN — PREGABALIN 75 MG: 75 CAPSULE ORAL at 03:38

## 2017-12-18 RX ADMIN — FAMOTIDINE 20 MG: 20 TABLET, FILM COATED ORAL at 08:45

## 2017-12-18 RX ADMIN — Medication 10 ML: at 20:46

## 2017-12-18 RX ADMIN — FAMOTIDINE 20 MG: 20 TABLET, FILM COATED ORAL at 20:46

## 2017-12-18 RX ADMIN — INSULIN GLARGINE 28 UNITS: 100 INJECTION, SOLUTION SUBCUTANEOUS at 20:53

## 2017-12-18 RX ADMIN — INSULIN LISPRO 5 UNITS: 100 INJECTION, SOLUTION INTRAVENOUS; SUBCUTANEOUS at 20:53

## 2017-12-18 RX ADMIN — ENOXAPARIN SODIUM 40 MG: 40 INJECTION SUBCUTANEOUS at 17:09

## 2017-12-18 RX ADMIN — IPRATROPIUM BROMIDE AND ALBUTEROL SULFATE 1 AMPULE: .5; 3 SOLUTION RESPIRATORY (INHALATION) at 09:20

## 2017-12-18 RX ADMIN — METFORMIN HYDROCHLORIDE 500 MG: 500 TABLET ORAL at 17:09

## 2017-12-18 RX ADMIN — INSULIN LISPRO 2 UNITS: 100 INJECTION, SOLUTION INTRAVENOUS; SUBCUTANEOUS at 08:57

## 2017-12-18 RX ADMIN — IPRATROPIUM BROMIDE AND ALBUTEROL SULFATE 1 AMPULE: .5; 3 SOLUTION RESPIRATORY (INHALATION) at 17:32

## 2017-12-18 RX ADMIN — OXYCODONE HYDROCHLORIDE AND ACETAMINOPHEN 1 TABLET: 5; 325 TABLET ORAL at 20:46

## 2017-12-18 RX ADMIN — ATORVASTATIN CALCIUM 40 MG: 40 TABLET, FILM COATED ORAL at 20:46

## 2017-12-18 RX ADMIN — CEFTRIAXONE SODIUM 1 G: 10 INJECTION, POWDER, FOR SOLUTION INTRAVENOUS at 17:09

## 2017-12-18 RX ADMIN — OXYCODONE HYDROCHLORIDE AND ACETAMINOPHEN 1 TABLET: 5; 325 TABLET ORAL at 11:39

## 2017-12-18 RX ADMIN — CLOPIDOGREL 75 MG: 75 TABLET, FILM COATED ORAL at 08:45

## 2017-12-18 ASSESSMENT — PAIN SCALES - GENERAL
PAINLEVEL_OUTOF10: 7
PAINLEVEL_OUTOF10: 7
PAINLEVEL_OUTOF10: 8

## 2017-12-18 ASSESSMENT — PAIN DESCRIPTION - DESCRIPTORS: DESCRIPTORS: ACHING

## 2017-12-18 ASSESSMENT — PAIN DESCRIPTION - PAIN TYPE
TYPE: ACUTE PAIN
TYPE: ACUTE PAIN

## 2017-12-18 ASSESSMENT — PAIN DESCRIPTION - FREQUENCY: FREQUENCY: CONTINUOUS

## 2017-12-18 ASSESSMENT — PAIN DESCRIPTION - LOCATION: LOCATION: BACK

## 2017-12-18 NOTE — PROGRESS NOTES
[place] [facility]    Patient - Manuel Etienne   MRN -  [id number]   [facility]   - 1960      Date of Admission -  2017 12:12 PM  Date of evaluation -  2017  Room - 3B--A   Hospital Day - [facility] [address] MD Primary Care Physician - Joel Rizvi MD   Chief Complaint   ? Lung mass; pneumonia. Active Hospital Problem List      Active Hospital Problems    Diagnosis Date Noted    Generalized abdominal pain [R10.84]     Coronary artery disease involving native coronary artery of native heart without angina pectoris [I25.10]     Acute hypercapnic respiratory failure (HCC) [J96.02]     Pleural effusion, left [J90]     Pneumonia due to organism [J18.9] 2017    Acute cystitis [N30.00] 2017    Pleural mass [J94.9] 2017    Elevated troponin [R74.8] 2017    Hypoxia [R09.02] 2017    Complex regional pain syndrome type 1 of both lower extremities [G90.523]     Tobacco abuse [Z72.0] 2016    Insulin dependent type 2 diabetes mellitus, uncontrolled ([facility] [E11.65, Z79.4] 2015    Peripheral vascular disease ([facility] [I73.9]     CAD (coronary artery disease) [I25.10] 10/29/2014    Benign essential HTN [I10] 10/29/2014    Schizoaffective disorder ([facility] [F25.9] 2014     HPI   Manuel Etienne is a 61 y.o. female admitted for abd and back pain;   Pt lives at home alone and has [facility]; she states that she had a 1 day hx of abd and mid back pain; pt states HH RN gave her a NTG SL for c/o chest pain and EMS was called; pt does have chronic back pain and follows with Dr Kaveh Boone; pt has a hx of CVA with aphasia so a through hx is difficult to obtain from pt and no family at bedside; she states she smokes 1 PPD; denies any home use of 02.     She states she has had a non productive cough \"off and on\"; she states she has had some shortness of breath; denies any previous hx of lung appears moderately built and moderately nourished. Alert on NC and in no acute distress. Head: Normocephalic and atraumatic. Neck: Neck supple. No JVD or tracheal deviation present. Cardiovascular: Regular rate, regular rhythm, S1 and S2 with no murmur. No peripheral edema. NSR. Pulmonary/Chest: Normal effort with diminished breath sounds L>R. Rales on Left. No wheezing. No stridor. No respiratory distress. Pigtail drain in place. Abdominal: Soft. Bowel sounds audible. No distension, no tenderness, normal BS. + BM. Musculoskeletal: Moves all extremities with right BKA. Neurological: Alert and oriented with no focal deficits. Expressive dysphasia noted. Skin: Skin is warm and dry. No clubbing or cyanosis.       Labs   ABG  Lab Results   Component Value Date    PH 7.41 12/17/2017    PH 7.44 08/15/2015    PH 5.5 06/13/2012    PO2 71 12/17/2017    PO2 105 06/19/2012    PCO2 41 12/17/2017    PCO2 47 06/19/2012    HCO3 26 12/17/2017    HCO3 28.7 08/07/2015    O2SAT 94 12/17/2017     Lab Results   Component Value Date    IFIO2 6 12/17/2017    MODE CPAP/PS 12/16/2017    SETTIDVOL 590.0 06/18/2012    SETPEEP 6.0 12/16/2017     CBC  Recent Labs      12/16/17   1043  12/17/17   0428  12/18/17   0321   WBC   --   9.8  8.9   RBC   --   3.59*  3.75*   HGB   --   11.6*  11.9*   HCT   --   34.4*  35.7*   MCV   --   95.8  95.3   MCH   --   32.3*  31.8*   MCHC   --   33.7  33.4   RDW   --   13.5  13.5   PLT  170  164  179   MPV   --   9.5  9.6      BMP  Recent Labs      12/17/17   0428  12/18/17   0321   NA  139  138   K  3.9  3.8   CL  102  100   CO2  25  24   BUN  25*  18   CREATININE  1.4*  1.0   GLUCOSE  152*  159*   CALCIUM  8.4*  9.0     LFT  Recent Labs      12/17/17   0428   AST  30   ALT  19   BILITOT  0.3   ALKPHOS  153*     TROP  Lab Results   Component Value Date    TROPONINT 0.024 12/16/2017    TROPONINT 0.015 12/15/2017    TROPONINT 0.016 12/15/2017     BNP  Lab Results   Component Value Date    PROBNP 2714.0 normal thickness, no calcification,   and normal cuspal separation. DOPPLER: The transpulmonic velocity was   within the normal range with no evidence for regurgitation.      Left Atrium   The left atrium is Mildly dilated.      Left Ventricle   Normal left ventricle size and systolic function. Ejection fraction was   estimated at 55 to 60 %. There were no regional left ventricular wall   motion abnormalities .   Moderately increased left ventricle wall thickness.   Moderate concentric left ventricular hypertrophy.   Doppler parameters were consistent with abnormal left ventricular   relaxation (grade 1 diastolic dysfunction).      Right Atrium   Right atrial size was normal.      Right Ventricle   The right ventricular size was normal with normal systolic function and   wall thickness.      Pericardial Effusion   The pericardium was normal in appearance with no evidence of a pericardial   effusion.   There is moderate Left pleural effusion.      Pleural Effusion   No evidence of pleural effusion.       Cultures    Procalcitonin  Lab Results   Component Value Date    PROCAL 0.29 12/17/2017    PROCAL 0.64 12/14/2017    PROCAL 0.06 01/10/2015     Influenza A/B (-)  Blood cx x 2 (-) prelim. Urine: e-coli  Legionella/strep antigen negative  MRSA screening: negative  VRE screening: negative  Pleural fluid: Pending     Radiology    CXR    CXR 12/18 and 12/17   Interval placement of a left pleural pigtail catheter with significant improvement in the large left pleural effusion. New mid left lung atelectasis. Persistent left basilar opacity as discussed above. Stable interstitial pulmonary edema and pulmonary venous congestion. CTA chest: 12/15/17  FINDINGS:   Lungs: There is bilateral multilobar atelectasis. There is left lower lobe consolidation felt to represent passive atelectasis related to the effusion. A component of pneumonia in the left lower lobe is not excluded. .       Pleura:  There is a moderate subpleural density at the posterior left mid lung (image 27). Calcified granulomas present in the left upper lobe. There are minimal reticular opacities in the right lower lung. There is no mediastinal, hilar or axillary lymphadenopathy. However,    evaluation is limited due to absence of contrast. Degenerative changes are present in the thoracic spine without evidence of aggressive osseous lesions.       Upper abdominal organs are evaluated on same-day CT of the abdomen and pelvis.           Impression   1. Partially loculated small left-sided pleural effusion with adjacent lung atelectasis/infiltrate. 2. Irregular pleural thickening in the left hemithorax highly suspicious for an infiltrating neoplastic process. 3. Focal subpleural nodule at the posterior left mid lung, possibly a pseudotumor secondary to trapped pleural fluid. However, malignancy cannot be excluded. 4. Minimal right basilar atelectasis/infiltrate. 5. Cardiomegaly       CT abd/pelvis 12/14/17       1. There is a fluid collection at the left lung base with overlying patchy density. The fluid collection may represent a loculated effusion versus empyema. Overlying patchy density may represent atelectasis or infectious infiltrate.       2. No acute abnormality is identified within the abdomen or pelvis. (See actual reports for details)    Assessment   CAP, LLL with effusion (parapneumonic) s/p Pig tail cathter placement: Fluid results pending. CXR and oxygenation are improved. Acute Hypercapneic/Hypoxic Respiratory Failure: Stable and now refusing BiPAP. Acute on Chronic Diastolic HF   Irregular pleural thickening in the left hemithorax- ?  Loculated pleural effusion  Focal subpleural nodule at the posterior left mid lung, possibly a pseudotumor secondary to trapped pleural fluid  Probable COPD with smoking hx: No signs of acute exacerbation  Possible JAROD   Tobacco Abuse with 1 PPD  CAD with CABG elevated troponin: For stress test

## 2017-12-18 NOTE — PROGRESS NOTES
MI Documentation    MI: NSTEMI      PCI: no    EF: 63-76%, Grade 1 diastolic  · ASA w/i first 24 hours: yes, taken prior to admission (home medication)      · BB w/i first 24 hours: no, held for hypotension, bradycardia        ------------------------------------------  1. ASA: yes  2. BB: metoprolol  3. ACE/ARB: no, EF > 40%  4. Statin: atorvastatin  5.   P2Y12: clopidogrel    ))))))REMEMBER NTG SL AT DISCHARGE((((((

## 2017-12-18 NOTE — PROGRESS NOTES
not appropriate at this time  Encouraged 6 small meals/day as able to tolerate. Encouraged protein choice with each meal.    Nutrition Evaluation:   · Evaluation: No progress toward goals   · Goals: Pt. will consume 75% or more at meals safely during LOS. · Monitoring: Meal Intake, Supplement Intake, Chewing/Swallowing, Pertinent Labs, Comparative Standards, Weight, Mental Status/Confusion, Wound Healing    See Adult Nutrition Doc Flowsheet for more detail.      Electronically signed by Karen Perez RD, LD on 12/18/17 at 3:59 PM    Contact Number: [phone]

## 2017-12-18 NOTE — PLAN OF CARE
Problem: Impaired respiratory status  Goal: Clear lung sounds  Outcome: Ongoing  Improved lung sounds post Tx.

## 2017-12-18 NOTE — PROGRESS NOTES
Q6H    nicotine  1 patch Transdermal Daily    ipratropium-albuterol  1 ampule Inhalation Q4H WA    aspirin  81 mg Oral Daily    clopidogrel  75 mg Oral Daily    atorvastatin  40 mg Oral Nightly    insulin glargine  28 Units Subcutaneous Daily    metoprolol tartrate  25 mg Oral BID    cefTRIAXone (ROCEPHIN) IV  1 g Intravenous Q24H    sodium chloride flush  10 mL Intravenous 2 times per day    famotidine  20 mg Oral BID    DULoxetine  30 mg Oral Daily     Continuous Infusions:   dextrose 5 % and 0.9 % NaCl       PRN Meds:.oxyCODONE-acetaminophen, ipratropium-albuterol, dextrose 5 % and 0.9 % NaCl, glucose, dextrose, glucagon (rDNA), sodium chloride flush, acetaminophen, magnesium hydroxide, ondansetron, nitroGLYCERIN    10 point review of systems completed, all other than noted above are negative. Vitals:   Vitals:    12/18/17 1657   BP: 128/78   Pulse: 71   Resp:    Temp: 98.1 °F (36.7 °C)   SpO2: 95%      BMI: Body mass index is 31.5 kg/m².                 Exam:  Physical Examination: General appearance - alert, ill appearing, on bipap  Mental status - alert, oriented to person, unable decipher if oriented to place  Neck - supple, no significant adenopathy, no JVD, or carotid bruits  Chest - clear to auscultation, no wheezes, rales or rhonchi, symmetric air entry  Heart - normal rate, regular rhythm, normal S1, S2, no murmurs, rubs, clicks or gallops  Abdomen - soft, nontender, nondistended, no masses or organomegaly  Neurological - alert, oriented, delayed response, expressive aphasia, no focal findings or movement disorder noted  Musculoskeletal - s/p right BKA, no swelling, no tenderness to palpation  Extremities - peripheral pulses normal, no pedal edema, no clubbing or cyanosis  Skin - normal coloration and turgor, no rashes, no suspicious skin lesions noted      Review of Labs and Diagnostic Testing:    Recent Results (from the past 24 hour(s))   POCT Glucose    Collection Time: 12/17/17 8:05 PM   Result Value Ref Range    POC Glucose 189 (H) 70 - 108 mg/dl   CBC    Collection Time: 12/18/17  3:21 AM   Result Value Ref Range    WBC 8.9 4.8 - 10.8 thou/mm3    RBC 3.75 (L) 4.20 - 5.40 mill/mm3    Hemoglobin 11.9 (L) 12.0 - 16.0 gm/dl    Hematocrit 35.7 (L) 37.0 - 47.0 %    MCV 95.3 81.0 - 99.0 fL    MCH 31.8 (H) 27.0 - 31.0 pg    MCHC 33.4 33.0 - 37.0 gm/dl    RDW 13.5 11.5 - 14.5 %    Platelets 101 315 - 901 thou/mm3    MPV 9.6 7.4 - 10.4 mcm   Protein, total    Collection Time: 12/18/17  3:21 AM   Result Value Ref Range    Total Protein 6.4 6.1 - 8.0 g/dL   Lactate dehydrogenase    Collection Time: 12/18/17  3:21 AM   Result Value Ref Range     (H) 100 - 190 U/L   Basic Metabolic Panel    Collection Time: 12/18/17  3:21 AM   Result Value Ref Range    Sodium 138 135 - 145 meq/L    Potassium 3.8 3.5 - 5.2 meq/L    Chloride 100 98 - 111 meq/L    CO2 24 23 - 33 meq/L    Glucose 159 (H) 70 - 108 mg/dL    BUN 18 7 - 22 mg/dL    CREATININE 1.0 0.4 - 1.2 mg/dL    Calcium 9.0 8.5 - 10.5 mg/dL   Anion Gap    Collection Time: 12/18/17  3:21 AM   Result Value Ref Range    Anion Gap 14.0 8.0 - 16.0 meq/L   Glomerular Filtration Rate, Estimated    Collection Time: 12/18/17  3:21 AM   Result Value Ref Range    Est, Glom Filt Rate 57 (A) ml/min/1.73m2   POCT Glucose    Collection Time: 12/18/17  6:07 AM   Result Value Ref Range    POC Glucose 193 (H) 70 - 108 mg/dl   POCT Glucose    Collection Time: 12/18/17 10:54 AM   Result Value Ref Range    POC Glucose 230 (H) 70 - 108 mg/dl   POCT Glucose    Collection Time: 12/18/17  3:41 PM   Result Value Ref Range    POC Glucose 247 (H) 70 - 108 mg/dl   Body fluid cell count with differential    Collection Time: 12/18/17  4:00 PM   Result Value Ref Range    Specimen Thoracentesis     Color DARK YELLOW     Character, Body Fluid CLOUDY     WBC, Fluid 1,364 (H) 0 - 500 /mm3    RBC, Fluid 6,333 (H) 0 - 100 /cumm       Radiology:     Ct Chest Wo Contrast    Result Date: empyema. Overlying patchy density may represent atelectasis or infectious infiltrate. 2. No acute abnormality is identified within the abdomen or pelvis. **This report has been created using voice recognition software. It may contain minor errors which are inherent in voice recognition technology. ** Final report electronically signed by Dr. Demetrio Shane on 12/14/2017 3:40 PM        EKG: Normal sinus rhythm  Possible Left atrial enlargement      Assessment / Plan:    1. PNA (pneumonia)-acute on chronic hypercarbic,hypoxic resp failure-  gentle hydration, Rocephin,, duonebs prn, azithro, f/u cultures  2. Schizoaffective disorder ([facility] treated  3. Benign essential HTN- resume home meds with parameters to hold  4. CAD (coronary artery disease)- NTG sl prn, Lopressor, Lisinopril, ASA  5. Peripheral vascular disease (HCC)\  6. Insulin dependent type 2 diabetes mellitus, uncontrolled (HCC)-SSI, HgA1C  7. Tobacco abuse- cessation counseling  8. Complex regional pain syndrome type 1 of both lower extremities  9. Acute cystitis- Rocephin, await cultures  10. Pleural mass- Pulm consult- thinks its pseudo- trapped lung  11.    NSTEMI- on heprain gtt, acs protocol- cardio seeing- stress paresh, heparin stopped      F/u thorcentesis labs  Stress test paresh am        DVT prophylaxis: [] Lovenox                                 [] SCDs                                 [] SQ Heparin                                 [] Encourage ambulation, low risk for DVT, no chemical or mechanical prophylaxis necessary              [x] Already on Anticoagulation                Anticipated Disposition upon discharge: [] Home                                                                         [x] Home with Home Health                                                                         [] [facility]                                                                         [] [address] [place] [address] [facility]          Electronically signed by Kim Robb MD on 12/18/2017 at 6:10 PM

## 2017-12-18 NOTE — PROGRESS NOTES
[facility] 3B    TIME   SLP Individual Minutes  Time In: 8117  Time Out: 4010  Minutes: 8  Timed Code Treatment Minutes: 0 Minutes       [x]Daily Note  []Progress Note  []Discharge Note    Date: 2017  Patient Name: Earl Canut        MRN: [id number]    : 1960  (61 y.o.)  Gender: female   Primary Provider: Sherren Duck, MD  Admitting Diagnosis:  Pneumonia   Secondary Diagnosis: Dysphagia  Precautions: Aspiration  Swallowing Status/Diet: Dysphagia II with thin   Swallowing Strategies: Upright position  DATE of last BSE:  12/15    Subjective: Patient seen sitting at edge of bed with lunch tray. Patient with significant fatigue; overall fair cooperation. Reporting pain in her legs 7/10. RN aware. SHORT TERM GOAL #1:  Goal 1: Pt will tolerate Dysphagia II and thin diet without overt aspiration to ensure safe and adequate nutrition and hydration  INTERVENTIONS: Patient seen with lunch tray only agreeable to PO trials of magic cup and thin liquids via straw. NO s/s of aspiration noted with limited trails of magic cup and thin liquids via straw. Patient refused additional trials. Recommend continuation of dysphagia II with thin liquids. SHORT TERM GOAL #2:  Goal 2: Pt will tolerate trials of advanced solids without overt aspiration to 10/10 tmes to advance pt to least restricitive diet. INTERVENTIONS:  ST offered multiple advanced solids; patient refused. Patient reporting, \"nothing sounds good\"    SHORT TERM GOAL #3:  Goal 3: Monitor pulmonary status and complete further instrumental assessment as indicated  INTERVENTIONS: No report of change in pulmonary status at this time. Patient presents to be breathing comfortably; however, reports poor breathing this date.           ASSESSMENT:  Assessment: [x]Progressing towards goals          []Not Progressing towards goals       Patient Tolerance of Treatment:   []Tolerated

## 2017-12-18 NOTE — PROGRESS NOTES
[address] [address]  INPATIENT PHYSICAL THERAPY  EVALUATION  [facility] CCU-[facility]PD[facility] 3B    Time In: 3895  Time Out: 1045  Timed Code Treatment Minutes: 25 Minutes  Minutes: 40        Date: 2017  Patient Name: Danna Albert,  Gender:  female        MRN: [id number]  : 1960  (61 y.o.)      Referring Practitioner: Vishal Henderson CNP  Diagnosis: Pneumonia  Additional Pertinent Hx: The patient is a 61 y.o. female with h/o Anxiety; PVD, S/p right BKA, CAD, s/p CABG, s/p CVA,  Diabetes, Chronic back pain; Chronic pain; Depression; Fibromyalgia; Hyperlipidemia; Hypertension;  Neuropathy, RSD, Schizoaffective disorder, Stroke with Receptive aphasia who presents to the Emergency Department for the evaluation of abdominal and back pain. Patient is not able to give any history and daughter is no longer at the bedside. Per ED chart review, her abdominal pain started yesterday,  is intermittent and is made worse when she moves. Patient is not ambulating and uses wheelchair to get around. She does have home health nurses provide periodic care at her home. Per nursing staff on the medical floor, patient's daughter notified her that patient had taken her home percocet and Metformin in the ED without notifying ED staff. Patient's daughter also states that patient is prone to over medicating herself with her pain med. Patient shows me her amputated limb when I asked if she had any pain. Patient was noted to have complained of chest pain yesterday and was given Nitro by home health nurse. Patient's troponin is elevated at 0.020 and it is being trended. Patient was hypotensive with SBP- 81/66 so fluid bolus was given. Heparin infusion has been started and Cardiology consulted. Pt to have stress test .      Past Medical History:   Diagnosis Date    Anxiety     Arthritis     Atherosclerotic PVD with ulceration: Left foot ulcer 2013    3/29/2013: BOGDAN RT : 0.50, LT : 0.42     CAD (coronary artery disease)  Chest pressure     Chronic back pain     Chronic pain     Depression     Fibromyalgia     Hyperlipidemia     Hypertension     Mild mitral regurgitation     Neuropathy ([address]     Onychomycosis     RSD (reflex sympathetic dystrophy)     S/P CAB2013: CABG X 3     [facility]     Stroke ([facility] 12    no deficits    Type II or unspecified type diabetes mellitus without mention of complication, not stated as uncontrolled      Past Surgical History:   Procedure Laterality Date    CARDIAC SURGERY      CARDIOVASCULAR STRESS TEST  12    normal LV size and fx, mildly intense perfusion defect of the anterior wall which is reversible on rest images.  CHOLECYSTECTOMY      CORONARY ARTERY BYPASS GRAFT  2012    FOOT SURGERY Right 2014    DEBRIDEMENT HEEL    HYSTERECTOMY      LEG AMPUTATION BELOW KNEE Right 14    Dr. Kirstie Vail N/A 2017    LUMBAR SNRB performed by Colleen Fonseca MD at [address] [place] [address] OTHER SURGICAL HISTORY Right 10/31/14    Debridement of Right Below Knee Amputation Stump and Reclosure of Stump - Dr. Anthony Tang Bilateral 2017    Lumbar Facet MBB L3-4, L4-5, L5-S1    OTHER SURGICAL HISTORY Left 2017    Lumbar Sympathetic Block, Left Side    OTHER SURGICAL HISTORY Bilateral 2017    lumbar smpathetic nerve block bilateral    TRANSTHORACIC ECHOCARDIOGRAM  12    EF 55-60% mild mitral regurg       Restrictions/Precautions:  Restrictions/Precautions: General Precautions, Fall Risk  Position Activity Restriction  Other position/activity restrictions: R BKA, h/o CVA with expressive aphasia, confusion     Subjective:  Chart Reviewed: Yes  Patient assessed for rehabilitation services?: Yes  Family / Caregiver Present: No  Subjective: RN approved session, pt is cooperative.   Pt with delayed processing and Balance Training, Endurance Training, Functional Mobility Training, Transfer Training, Patient/Caregiver Education & Training, Wheelchair Mobility Training    Goals:  Patient goals : To go home. Short term goals  Time Frame for Short term goals: 2 weeks  Short term goal 1: Pt to transfer supine <--> sit SBA to enable pt to get in/out of bed. Short term goal 2: Pt to transfer sit <--> stand CGA for increased functional mobility. Short term goal 3: Pt to transfer sit pivot CGA for increased functional mobility. Short term goal 4: Pt to self propel in manual w/c 200 feet SBA for household mobility. Long term goals  Time Frame for Long term goals : NA due to short length of stay. Evaluation Complexity: Based on the findings of patient history, examination, clinical presentation, and decision making during this evaluation, the evaluation of Yimi Briscoe  is of medium complexity. PT G-Codes  Functional Limitation: Mobility: Walking and moving around  Mobility: Walking and Moving Around Current Status (): At least 80 percent but less than 100 percent impaired, limited or restricted  Mobility: Walking and Moving Around Goal Status ():  At least 60 percent but less than 80 percent impaired, limited or restricted     AM-PAC Inpatient Mobility without Stair Climbing Raw Score : 8  AM-PAC Inpatient without Stair Climbing T-Scale Score : 30.65  Mobility Inpatient CMS 0-100% Score: 80.91  Mobility Inpatient without Stair CMS G-Code Modifier : EMILY

## 2017-12-18 NOTE — PLAN OF CARE
Problem: Falls - Risk of  Goal: Absence of falls  Outcome: Ongoing  Pt remained free from falls at this time, pt was in bed throughout the night and was turned q 2 hrs    Problem: Risk for Impaired Skin Integrity  Goal: Tissue integrity - skin and mucous membranes  Structural intactness and normal physiological function of skin and  mucous membranes. Outcome: Ongoing  Pt shows no increase in skin breakdown    Problem: Pain:  Goal: Pain level will decrease  Pain level will decrease   Outcome: Ongoing  Pt has PRN percocet that controls her chronic pain. Problem: Anxiety/Stress:  Goal: Level of anxiety will decrease  Level of anxiety will decrease   Outcome: Ongoing  Pt anxiety has decreased from previus night. Lyrica was re added to the pt medication. Problem: Aspiration:  Goal: Absence of aspiration  Absence of aspiration   Outcome: Ongoing  Pt shows no signs of aspiration. Problem: Gas Exchange - Impaired:  Goal: Levels of oxygenation will improve  Levels of oxygenation will improve   Outcome: Ongoing  Pt Oxygenation has improved without the need of BIpap    Problem: Serum Glucose Level - Abnormal:  Goal: Ability to maintain appropriate glucose levels will improve to within specified parameters  Ability to maintain appropriate glucose levels will improve to within specified parameters   Outcome: Ongoing  Pt is ACHS and chems have been stabilizing within parameters    Comments: Care plan reviewed with patient. Patient verbalizes understanding of the care plan and contributed to goal setting.

## 2017-12-18 NOTE — PROGRESS NOTES
Metaneb therapy was given to the patient. Vitals before treatment:    heart rate 80   respiratory rate 20  Breath sounds before treatment:  diminished breath sounds- throughout  Medication:  Medication delivered via Metaneb was duoneb . Continuous High Frequency Oscillation (CHFO)   CHFO was applied to patient. Continuous Positive Expiratory Pressure (CPEP)  CPEP was applied to patient. Vitals during treatment:   heart rate 72   respiratory rate 20  Duration:    Therapy was given for 9 minutes. Tolerance:   Patient tolerated the procedure well.    Vitals after treatment:    heart rate 72  respiratory rate 20  Breath sounds after treatment:  diminished breath sounds- throughout  Cough/sputum:  nonproductive

## 2017-12-18 NOTE — PLAN OF CARE
Problem: Nutrition  Goal: Optimal nutrition therapy  Outcome: Ongoing  Nutrition Problem: Inadequate energy intake  Intervention: Food and/or Nutrient Delivery: Continue current diet, Continue current ONS  Nutritional Goals: Pt. will consume 75% or more at meals safely during LOS.

## 2017-12-18 NOTE — PROGRESS NOTES
Inhalation Q4H WA    aspirin  81 mg Oral Daily    clopidogrel  75 mg Oral Daily    atorvastatin  40 mg Oral Nightly    azithromycin  250 mg Oral Q24H    insulin glargine  28 Units Subcutaneous Daily    metoprolol tartrate  25 mg Oral BID    cefTRIAXone (ROCEPHIN) IV  1 g Intravenous Q24H    insulin lispro  0-12 Units Subcutaneous TID     insulin lispro  0-6 Units Subcutaneous Nightly    sodium chloride flush  10 mL Intravenous 2 times per day    famotidine  20 mg Oral BID    DULoxetine  30 mg Oral Daily      dextrose 5 % and 0.9 % NaCl         oxyCODONE-acetaminophen 1 tablet Q8H PRN   ipratropium-albuterol 1 ampule Q4H PRN   dextrose 5 % and 0.9 % NaCl  PRN   glucose 15 g PRN   dextrose 12.5 g PRN   glucagon (rDNA) 1 mg PRN   sodium chloride flush 10 mL PRN   acetaminophen 650 mg Q4H PRN   magnesium hydroxide 30 mL Daily PRN   ondansetron 4 mg Q6H PRN   heparin (porcine) 80 Units/kg PRN   heparin (porcine) 40 Units/kg PRN   nitroGLYCERIN 0.4 mg Q5 Min PRN       Diagnostics:  EK-DEC-2017 06:08:38 [facility] ROUTINE RETRIEVAL  Normal sinus rhythm  Anterior infarct (cited on or before 2012)  Abnormal ECG  When compared with ECG of 14-DEC-2017 23:21,  T wave inversion no longer evident in Lateral leads  Confirmed by [facility] MD, [id number] [facility] ([facility]) on 2017 7:33:14 PM    Echo:   Electronically signed by Claudia Haro MD (Interpreting   physician) on 12/15/2017 at 10:20 PM   ----------------------------------------------------------------      Findings      Mitral Valve   The mitral valve structure was normal with normal leaflet separation.   DOPPLER: The transmitral velocity was within the normal range with no   evidence for mitral stenosis. Trace mitral regurgitation is present.      Aortic Valve   The aortic valve was trileaflet with normal thickness and cuspal   separation. DOPPLER: Transaortic velocity was within the normal range with   no evidence of aortic stenosis.  There was no evidence of aortic   regurgitation.      Tricuspid Valve   The tricuspid valve structure was normal with normal leaflet separation.   DOPPLER: There was no evidence of tricuspid stenosis.   Trivial tricuspid regurgitation visualized.      Pulmonic Valve   The pulmonic valve leaflets exhibited normal thickness, no calcification,   and normal cuspal separation. DOPPLER: The transpulmonic velocity was   within the normal range with no evidence for regurgitation.      Left Atrium   The left atrium is Mildly dilated.      Left Ventricle   Normal left ventricle size and systolic function. Ejection fraction was   estimated at 55 to 60 %. There were no regional left ventricular wall   motion abnormalities .   Moderately increased left ventricle wall thickness.   Moderate concentric left ventricular hypertrophy.   Doppler parameters were consistent with abnormal left ventricular   relaxation (grade 1 diastolic dysfunction).      Right Atrium   Right atrial size was normal.      Right Ventricle   The right ventricular size was normal with normal systolic function and   wall thickness.      Pericardial Effusion   The pericardium was normal in appearance with no evidence of a pericardial   effusion.   There is moderate Left pleural effusion.      Pleural Effusion   No evidence of pleural effusion.      Aorta / Great Vessels   -Aortic root dimension within normal limits.   -The Pulmonary artery is within normal limits.   -IVC size is within normal limits with normal respiratory phasic changes        Left Heart Cath:   cabg 6/2012      Lab Data:    Cardiac Enzymes:  No results for input(s): CKTOTAL, CKMB, CKMBINDEX, TROPONINI in the last 72 hours.     CBC:   Lab Results   Component Value Date    WBC 8.9 12/18/2017    RBC 3.75 12/18/2017    RBC 4.60 06/07/2012    HGB 11.9 12/18/2017    HCT 35.7 12/18/2017     12/18/2017       CMP:  Lab Results   Component Value Date     12/18/2017    K 3.8 12/18/2017     12/18/2017    CO2 24 12/18/2017    BUN 18 12/18/2017    CREATININE 1.0 12/18/2017    AGRATIO 1.6 12/13/2015    LABGLOM 57 12/18/2017    GLUCOSE 159 12/18/2017    GLUCOSE 262 12/13/2015    CALCIUM 9.0 12/18/2017       Hepatic Function Panel:  Lab Results   Component Value Date    ALKPHOS 153 12/17/2017    ALT 19 12/17/2017    AST 30 12/17/2017    PROT 6.4 12/18/2017    BILITOT 0.3 12/17/2017    BILIDIR 0.1 12/13/2015    LABALBU 2.2 12/17/2017    LABALBU 4.5 06/07/2012       Magnesium:    Lab Results   Component Value Date    MG 2.0 08/17/2015       PT/INR:    Lab Results   Component Value Date    PROTIME 10.0 12/09/2016    INR 0.91 01/17/2017       HgBA1c:    Lab Results   Component Value Date    LABA1C 8.4 12/14/2017       FLP:  Lab Results   Component Value Date    TRIG 282 08/08/2015    HDL 39 08/08/2015    LDLCALC SEE BELOW 03/03/2014    LDLDIRECT 143 08/08/2015       TSH:    Lab Results   Component Value Date    TSH 6.00 08/16/2015         Assessment:    LT sided PNA  Pleural effusion- s\p thoracentesis of 400 ml with pleuryx placed 12/17/2017    Elevated trop: max 0.03   likely secondary to the above- needs ischemic work-up down the line   Not NSTEMI      CABG x3 6/ 2012    DM II    Severe PAD per cath 2013- followed by ??   PVD- RBKA amputee  Hx CVA        Plan:    Stress in am  Continue cardiac meds           Electronically signed by Sherrie Black CNP on 12/18/2017 at 8:01 AM

## 2017-12-18 NOTE — PROGRESS NOTES
[address]  INPATIENT OCCUPATIONAL THERAPY  [facility] CCU-[facility]PD[facility] 3B  EVALUATION    Time:  Time In: 7170  Time Out: 1406  Timed Code Treatment Minutes: 10 Minutes  Minutes: 23    Date: 2017  Patient Name: Ar Barrow,   Gender: female      MRN: [id number]  : 1960  (61 y.o.)  Referring Practitioner: Lizandro Barillas CNP  Diagnosis: PNA  Additional Pertinent Hx: The patient is a 61 y.o. female with h/o Anxiety; PVD, S/p right BKA, CAD, s/p CABG, s/p CVA,  Diabetes, Chronic back pain; Chronic pain; Depression; Fibromyalgia; Hyperlipidemia; Hypertension;  Neuropathy, RSD, Schizoaffective disorder, Stroke with Receptive aphasia who presents to the Emergency Department for the evaluation of abdominal and back pain. Patient is not able to give any history and daughter is no longer at the bedside. Per ED chart review, her abdominal pain started yesterday,  is intermittent and is made worse when she moves. Patient is not ambulating and uses wheelchair to get around. She does have home health nurses provide periodic care at her home. Per nursing staff on the medical floor, patient's daughter notified her that patient had taken her home percocet and Metformin in the ED without notifying ED staff. Patient's daughter also states that patient is prone to over medicating herself with her pain med. Patient shows me her amputated limb when I asked if she had any pain. Patient was noted to have complained of chest pain yesterday and was given Nitro by home health nurse. Patient's troponin is elevated at 0.020 and it is being trended. Patient was hypotensive with SBP- 81/66 so fluid bolus was given. Heparin infusion has been started and Cardiology consulted. Pt to have stress test .      Restrictions/Precautions:  Restrictions/Precautions: General Precautions, Fall Risk, Contact Precautions (MRSA)            Position Activity Restriction  Other position/activity restrictions: R BKA, h/o CVA with supine in bed upon arrival, agreeable to OT session with minimal encouragement. Pt demoes expressive aphasia throughout and significant delay to answering questions/follow commands. Comments: RN ok'd session this date. General:  Overall Orientation Status: Impaired  Orientation Level: Oriented to person, Oriented to place, Disoriented to time, Disoriented to situation    Vision: Within Functional Limits    Hearing: Within functional limits    Pain:  Pain Assessment  Patient Currently in Pain: Denies       Social/Functional:  Lives With: Alone  Type of Home: House  Home Layout: One level  Home Access: Ramped entrance  Home Equipment: [employer], 4 wheeled walker     Bathroom Shower/Tub: Tub/Shower unit  Bathroom Toilet: Handicap height  Bathroom Equipment: Shower chair    Receives Help From: Home health  ADL Assistance: Needs assistance (per pt report)  Bath: Minimal assistance  Dressing: Minimal assistance  Grooming: Modified independent   Feeding: Modified independent   Toileting: Independent  Homemaking Assistance: Needs assistance (HHA completes all)       Ambulation Assistance: Needs assistance (primarily uses w/c)  Transfer Assistance: Independent    Active : No  Additional Comments: Question accuracy of pt reported PLOF/home environment due to contradicting information given. Pt reported had RLE prosthetic although due to frequent falls when using no llonger uses. Pt reported primarily uses w/c for mobility, pivots to/from. Pt stated does not have home O2. Objective        Overall Cognitive Status: Exceptions  Arousal/Alertness: Delayed responses to stimuli  Following Commands:  Follows one step commands with increased time, Follows one step commands with repetition  Attention Span: Difficulty attending to directions, Attends with cues to redirect  Memory: Decreased short term memory, Decreased long term memory, Decreased recall of precautions  Safety Judgement: Decreased awareness of term goal 3: Pt will complete BUE light resistive exercises X10 reps to increase overall strength/endurance needed for ADLs and transfers. Long term goals  Time Frame for Long term goals : not set due to ELOS    Evaluation Complexity: Based on the findings of patient history, examination, clinical presentation, and decision making during this evaluation, this patient is of medium complexity.     AM-[facility] Inpatient Daily Activity Raw Score: 16  AM-PAC Inpatient ADL T-Scale Score : 35.96  ADL Inpatient CMS 0-100% Score: 53.32  ADL Inpatient CMS G-Code Modifier : CK

## 2017-12-19 ENCOUNTER — APPOINTMENT (OUTPATIENT)
Dept: NON INVASIVE DIAGNOSTICS | Age: 57
DRG: 193 | End: 2017-12-19
Payer: MEDICARE

## 2017-12-19 ENCOUNTER — APPOINTMENT (OUTPATIENT)
Dept: GENERAL RADIOLOGY | Age: 57
DRG: 193 | End: 2017-12-19
Payer: MEDICARE

## 2017-12-19 ENCOUNTER — APPOINTMENT (OUTPATIENT)
Dept: ULTRASOUND IMAGING | Age: 57
DRG: 193 | End: 2017-12-19
Payer: MEDICARE

## 2017-12-19 LAB
CHARACTER, BODY FLUID: ABNORMAL
COLOR: ABNORMAL
GLUCOSE BLD-MCNC: 172 MG/DL (ref 70–108)
GLUCOSE BLD-MCNC: 183 MG/DL (ref 70–108)
GLUCOSE BLD-MCNC: 206 MG/DL (ref 70–108)
GLUCOSE BLD-MCNC: 240 MG/DL (ref 70–108)
LYMPHOCYTES, BODY FLUID: 11 % (ref 25–100)
MONOCYTE, FLUID: 1 % (ref 25–100)
RBC FLUID: 6333 /CUMM (ref 0–100)
SEGMENTED NEUTROPHILS, BODY FLUID: 88 % (ref 0–25)
SPECIMEN: ABNORMAL
WBC FLUID: 1364 /MM3 (ref 0–500)

## 2017-12-19 PROCEDURE — 2700000000 HC OXYGEN THERAPY PER DAY

## 2017-12-19 PROCEDURE — 6370000000 HC RX 637 (ALT 250 FOR IP): Performed by: FAMILY MEDICINE

## 2017-12-19 PROCEDURE — 6370000000 HC RX 637 (ALT 250 FOR IP): Performed by: INTERNAL MEDICINE

## 2017-12-19 PROCEDURE — 99223 1ST HOSP IP/OBS HIGH 75: CPT | Performed by: PHYSICAL MEDICINE & REHABILITATION

## 2017-12-19 PROCEDURE — 92526 ORAL FUNCTION THERAPY: CPT

## 2017-12-19 PROCEDURE — 82948 REAGENT STRIP/BLOOD GLUCOSE: CPT

## 2017-12-19 PROCEDURE — 6370000000 HC RX 637 (ALT 250 FOR IP): Performed by: NURSE PRACTITIONER

## 2017-12-19 PROCEDURE — APPNB15 APP NON BILLABLE TIME 0-15 MINS: Performed by: NURSE PRACTITIONER

## 2017-12-19 PROCEDURE — 2140000000 HC CCU INTERMEDIATE R&B

## 2017-12-19 PROCEDURE — 3430000000 HC RX DIAGNOSTIC RADIOPHARMACEUTICAL: Performed by: NURSE PRACTITIONER

## 2017-12-19 PROCEDURE — 97530 THERAPEUTIC ACTIVITIES: CPT

## 2017-12-19 PROCEDURE — 97110 THERAPEUTIC EXERCISES: CPT

## 2017-12-19 PROCEDURE — 94660 CPAP INITIATION&MGMT: CPT

## 2017-12-19 PROCEDURE — 94640 AIRWAY INHALATION TREATMENT: CPT

## 2017-12-19 PROCEDURE — 76604 US EXAM CHEST: CPT

## 2017-12-19 PROCEDURE — 71020 XR CHEST STANDARD TWO VW: CPT

## 2017-12-19 PROCEDURE — 2580000003 HC RX 258: Performed by: FAMILY MEDICINE

## 2017-12-19 PROCEDURE — 6360000002 HC RX W HCPCS

## 2017-12-19 PROCEDURE — 99232 SBSQ HOSP IP/OBS MODERATE 35: CPT | Performed by: INTERNAL MEDICINE

## 2017-12-19 PROCEDURE — A9500 TC99M SESTAMIBI: HCPCS | Performed by: NURSE PRACTITIONER

## 2017-12-19 PROCEDURE — 93017 CV STRESS TEST TRACING ONLY: CPT

## 2017-12-19 PROCEDURE — 78452 HT MUSCLE IMAGE SPECT MULT: CPT

## 2017-12-19 PROCEDURE — 97542 WHEELCHAIR MNGMENT TRAINING: CPT

## 2017-12-19 PROCEDURE — 99233 SBSQ HOSP IP/OBS HIGH 50: CPT | Performed by: INTERNAL MEDICINE

## 2017-12-19 PROCEDURE — 94669 MECHANICAL CHEST WALL OSCILL: CPT

## 2017-12-19 PROCEDURE — APPSS30 APP SPLIT SHARED TIME 16-30 MINUTES: Performed by: NURSE PRACTITIONER

## 2017-12-19 PROCEDURE — 6360000002 HC RX W HCPCS: Performed by: FAMILY MEDICINE

## 2017-12-19 RX ORDER — INSULIN GLARGINE 100 [IU]/ML
32 INJECTION, SOLUTION SUBCUTANEOUS DAILY
Status: DISCONTINUED | OUTPATIENT
Start: 2017-12-19 | End: 2017-12-21 | Stop reason: HOSPADM

## 2017-12-19 RX ORDER — PREGABALIN 75 MG/1
75 CAPSULE ORAL 2 TIMES DAILY
Status: DISCONTINUED | OUTPATIENT
Start: 2017-12-19 | End: 2017-12-21 | Stop reason: HOSPADM

## 2017-12-19 RX ADMIN — Medication 32.3 MILLICURIE: at 07:50

## 2017-12-19 RX ADMIN — METOPROLOL TARTRATE 25 MG: 25 TABLET ORAL at 09:23

## 2017-12-19 RX ADMIN — METOPROLOL TARTRATE 25 MG: 25 TABLET ORAL at 21:34

## 2017-12-19 RX ADMIN — ASPIRIN 81 MG: 81 TABLET, COATED ORAL at 09:22

## 2017-12-19 RX ADMIN — Medication 10 ML: at 21:35

## 2017-12-19 RX ADMIN — DULOXETINE HYDROCHLORIDE 30 MG: 30 CAPSULE, DELAYED RELEASE ORAL at 09:23

## 2017-12-19 RX ADMIN — CLOPIDOGREL 75 MG: 75 TABLET, FILM COATED ORAL at 09:23

## 2017-12-19 RX ADMIN — INSULIN LISPRO 6 UNITS: 100 INJECTION, SOLUTION INTRAVENOUS; SUBCUTANEOUS at 17:58

## 2017-12-19 RX ADMIN — INSULIN LISPRO 3 UNITS: 100 INJECTION, SOLUTION INTRAVENOUS; SUBCUTANEOUS at 09:38

## 2017-12-19 RX ADMIN — OXYCODONE HYDROCHLORIDE AND ACETAMINOPHEN 1 TABLET: 5; 325 TABLET ORAL at 14:50

## 2017-12-19 RX ADMIN — FAMOTIDINE 20 MG: 20 TABLET, FILM COATED ORAL at 21:34

## 2017-12-19 RX ADMIN — ATORVASTATIN CALCIUM 40 MG: 40 TABLET, FILM COATED ORAL at 21:34

## 2017-12-19 RX ADMIN — IPRATROPIUM BROMIDE AND ALBUTEROL SULFATE 1 AMPULE: .5; 3 SOLUTION RESPIRATORY (INHALATION) at 20:39

## 2017-12-19 RX ADMIN — CEFTRIAXONE SODIUM 1 G: 10 INJECTION, POWDER, FOR SOLUTION INTRAVENOUS at 18:00

## 2017-12-19 RX ADMIN — PREGABALIN 75 MG: 75 CAPSULE ORAL at 04:45

## 2017-12-19 RX ADMIN — Medication 10 ML: at 09:37

## 2017-12-19 RX ADMIN — FAMOTIDINE 20 MG: 20 TABLET, FILM COATED ORAL at 09:23

## 2017-12-19 RX ADMIN — INSULIN GLARGINE 32 UNITS: 100 INJECTION, SOLUTION SUBCUTANEOUS at 21:35

## 2017-12-19 RX ADMIN — PREGABALIN 75 MG: 75 CAPSULE ORAL at 21:35

## 2017-12-19 RX ADMIN — IPRATROPIUM BROMIDE AND ALBUTEROL SULFATE 1 AMPULE: .5; 3 SOLUTION RESPIRATORY (INHALATION) at 12:36

## 2017-12-19 RX ADMIN — Medication 10.6 MILLICURIE: at 06:50

## 2017-12-19 RX ADMIN — OXYCODONE HYDROCHLORIDE AND ACETAMINOPHEN 1 TABLET: 5; 325 TABLET ORAL at 23:12

## 2017-12-19 RX ADMIN — METFORMIN HYDROCHLORIDE 500 MG: 500 TABLET ORAL at 09:23

## 2017-12-19 RX ADMIN — OXYCODONE HYDROCHLORIDE AND ACETAMINOPHEN 1 TABLET: 5; 325 TABLET ORAL at 04:45

## 2017-12-19 RX ADMIN — INSULIN LISPRO 6 UNITS: 100 INJECTION, SOLUTION INTRAVENOUS; SUBCUTANEOUS at 13:00

## 2017-12-19 RX ADMIN — PREGABALIN 75 MG: 75 CAPSULE ORAL at 10:14

## 2017-12-19 ASSESSMENT — PAIN DESCRIPTION - PAIN TYPE
TYPE: CHRONIC PAIN
TYPE: ACUTE PAIN

## 2017-12-19 ASSESSMENT — PAIN SCALES - GENERAL
PAINLEVEL_OUTOF10: 8
PAINLEVEL_OUTOF10: 7
PAINLEVEL_OUTOF10: 5
PAINLEVEL_OUTOF10: 10

## 2017-12-19 ASSESSMENT — PAIN DESCRIPTION - LOCATION
LOCATION: BACK
LOCATION: BACK

## 2017-12-19 NOTE — PLAN OF CARE
Problem: Falls - Risk of  Goal: Absence of falls  Outcome: Met This Shift  Patient remained free from falls this shift. Fall sign posted, armband & socks on, bed exit alarm on. Problem: Risk for Impaired Skin Integrity  Goal: Tissue integrity - skin and mucous membranes  Structural intactness and normal physiological function of skin and  mucous membranes. Outcome: Met This Shift  Patient has no new skin breakdown this shift. Will continue to monitor. Problem: Pain:  Goal: Pain level will decrease  Pain level will decrease   Outcome: Ongoing  Patient continues to have prn complaints of pain. Patient is receiving pain medication PRN q8h    Problem: Discharge Planning:  Goal: Discharged to appropriate level of care  Discharged to appropriate level of care   Outcome: Ongoing  Patient is not a discharge candidate at this time. Planning in process. Problem: Anxiety/Stress:  Goal: Level of anxiety will decrease  Level of anxiety will decrease   Outcome: Met This Shift  Patient did not show signs of anxiety this shift. Patient remained calm. Problem: Aspiration:  Goal: Absence of aspiration  Absence of aspiration   Outcome: Met This Shift  Patient remained free from aspiration this shift. Comments: Care plan reviewed with patient. Patient verbalized understanding of the plan of care and contributed to goal setting.

## 2017-12-19 NOTE — PROGRESS NOTES
problems; she was hypoxic at 89% on arrival to ED; she did dev a temp of 100.1 at 0431 today; she had a CTA chest and CT WO chest that showed a LLL pneumonia along with a loculated pleural effusion on the left side; ABG's showed pH 7.31; PC02 61 and p02 61; she was placed on Bi-pap at 14/6; she states she is feeling better now than on arrival.      Past 24 hour events   Left Thoracentesis and pigtail catheter placed 12/17: 400 mL removed: Exudate by TP criteria. Pleural fluid cultures negative to date, no cytology sent  Today's CXR shows catheter is positioned above LLL consolidation vs loculated fluid  Staff reports no drain output  Went for stress test today  Remains Afebrile  Oxygen is improved down to 2 Liters from 6 (not on home oxygen)  Continues to refuse BiPAP  Continues Rocephin, Zithromax      Results for Randee Conroy (MRN [id number]) as of 12/19/2017 08:27   Ref.  Range 12/15/2017 16:00 12/18/2017 16:00   Character, Body Fluid Unknown  CLOUDY   Glucose, Fluid Latest Units: mg/dl 146    LD, Fluid Latest Units: U/L 880    RBC, Fluid Latest Ref Range: 0 - 100 /cumm  6,333 (H)   pH, Fluid Unknown 8.43    Protein, Fluid Latest Units: gm/dl 3.3    WBC, Fluid Latest Ref Range: 0 - 500 /mm3  1,364 (H)     TP ratio: .52  LDH ratio: .25     Meds    Current Medications    enoxaparin  40 mg Subcutaneous Daily    metFORMIN  500 mg Oral BID     insulin lispro  0-18 Units Subcutaneous TID     insulin lispro  0-9 Units Subcutaneous Nightly    pregabalin  75 mg Oral Q6H    nicotine  1 patch Transdermal Daily    ipratropium-albuterol  1 ampule Inhalation Q4H WA    aspirin  81 mg Oral Daily    clopidogrel  75 mg Oral Daily    atorvastatin  40 mg Oral Nightly    insulin glargine  28 Units Subcutaneous Daily    metoprolol tartrate  25 mg Oral BID    cefTRIAXone (ROCEPHIN) IV  1 g Intravenous Q24H    sodium chloride flush  10 mL Intravenous 2 times per day    famotidine  20 mg Oral BID    DULoxetine 30 mg Oral Daily     oxyCODONE-acetaminophen, ipratropium-albuterol, dextrose 5 % and 0.9 % NaCl, glucose, dextrose, glucagon (rDNA), sodium chloride flush, acetaminophen, magnesium hydroxide, ondansetron, nitroGLYCERIN  IV Drips/Infusions   dextrose 5 % and 0.9 % NaCl       Vitals    Vitals    height is 5' 5\" (1.651 m) and weight is 189 lb 4.8 oz (85.9 kg). Her oral temperature is 97.7 °F (36.5 °C). Her blood pressure is 114/69 and her pulse is 76. Her respiration is 18 and oxygen saturation is 94%. O2 Flow Rate (L/min): 2 L/min  I/O    Intake/Output Summary (Last 24 hours) at 12/19/17 0812  Last data filed at 12/19/17 0547   Gross per 24 hour   Intake              625 ml   Output             1475 ml   Net             -850 ml     Patient Vitals for the past 96 hrs (Last 3 readings):   Weight   12/18/17 0306 189 lb 4.8 oz (85.9 kg)   12/17/17 0430 180 lb 6.4 oz (81.8 kg)     Exam   Constitutional: Patient appears moderately built and moderately nourished. Alert on NC and in no acute distress. Head: Normocephalic and atraumatic. Neck: Neck supple. No JVD or tracheal deviation present. Cardiovascular: Regular rate, regular rhythm, S1 and S2 with no murmur. No peripheral edema. NSR. Pulmonary/Chest: Normal effort with diminished breath sounds L>R. More clear on Right. No wheezing. No stridor. No respiratory distress. Pigtail drain in place. No output. Abdominal: Soft. Bowel sounds audible. No distension, no tenderness, normal BS. + BM. Musculoskeletal: Moves all extremities with right BKA. Neurological: Alert and oriented with no focal deficits. Skin: Skin is warm and dry. No clubbing or cyanosis.       Labs   ABG  Lab Results   Component Value Date    PH 7.41 12/17/2017    PH 7.44 08/15/2015    PH 5.5 06/13/2012    PO2 71 12/17/2017    PO2 105 06/19/2012    PCO2 41 12/17/2017    PCO2 47 06/19/2012    HCO3 26 12/17/2017    HCO3 28.7 08/07/2015    O2SAT 94 12/17/2017     Lab Results   Component Value Date    IFIO2 6 12/17/2017    MODE CPAP/PS 12/16/2017    SETTIDVOL 590.0 06/18/2012    SETPEEP 6.0 12/16/2017     CBC  Recent Labs      12/16/17   1043  12/17/17   0428  12/18/17   0321   WBC   --   9.8  8.9   RBC   --   3.59*  3.75*   HGB   --   11.6*  11.9*   HCT   --   34.4*  35.7*   MCV   --   95.8  95.3   MCH   --   32.3*  31.8*   MCHC   --   33.7  33.4   RDW   --   13.5  13.5   PLT  170  164  179   MPV   --   9.5  9.6      BMP  Recent Labs      12/17/17   0428  12/18/17   0321   NA  139  138   K  3.9  3.8   CL  102  100   CO2  25  24   BUN  25*  18   CREATININE  1.4*  1.0   GLUCOSE  152*  159*   CALCIUM  8.4*  9.0     LFT  Recent Labs      12/17/17   0428   AST  30   ALT  19   BILITOT  0.3   ALKPHOS  153*     TROP  Lab Results   Component Value Date    TROPONINT 0.024 12/16/2017    TROPONINT 0.015 12/15/2017    TROPONINT 0.016 12/15/2017     BNP  Lab Results   Component Value Date    PROBNP 2714.0 12/14/2017    PROBNP 436.8 01/09/2015     D-Dimer  Lab Results   Component Value Date    DDIMER 1302.00 12/14/2017     Lactic Acid  Recent Labs      12/17/17   0428   LACTA  0.7     INR  No results for input(s): INR, PROTIME in the last 72 hours. PTT  Recent Labs      12/16/17   1856  12/17/17   0428  12/17/17   1039   APTT  61.3*  72.2*  68.6*     Glucose  Recent Labs      12/18/17   1541  12/18/17   2026  12/19/17   0626   POCGLU  247*  257*  183*     UA   Recent Labs      12/18/17   1600   COLORU  DARK YELLOW   .   PFTs   None in Epic  Echo 12/15/17   Normal left ventricle size and systolic function.   Ejection fraction was estimated at 55 to 60 %.   There were no regional left ventricular wall motion abnormalities .   Moderately increased left ventricle wall thickness.   Moderate concentric left ventricular hypertrophy.   Doppler parameters were consistent with abnormal left ventricular   relaxation (grade 1 diastolic dysfunction).   The left atrium is Mildly dilated.   There is moderate Left pleural effusions      Mitral Valve   The mitral valve structure was normal with normal leaflet separation.   DOPPLER: The transmitral velocity was within the normal range with no   evidence for mitral stenosis. Trace mitral regurgitation is present.      Aortic Valve   The aortic valve was trileaflet with normal thickness and cuspal   separation. DOPPLER: Transaortic velocity was within the normal range with   no evidence of aortic stenosis. There was no evidence of aortic   regurgitation.      Tricuspid Valve   The tricuspid valve structure was normal with normal leaflet separation.   DOPPLER: There was no evidence of tricuspid stenosis.   Trivial tricuspid regurgitation visualized.      Pulmonic Valve   The pulmonic valve leaflets exhibited normal thickness, no calcification,   and normal cuspal separation. DOPPLER: The transpulmonic velocity was   within the normal range with no evidence for regurgitation.      Left Atrium   The left atrium is Mildly dilated.      Left Ventricle   Normal left ventricle size and systolic function. Ejection fraction was   estimated at 55 to 60 %.  There were no regional left ventricular wall   motion abnormalities .   Moderately increased left ventricle wall thickness.   Moderate concentric left ventricular hypertrophy.   Doppler parameters were consistent with abnormal left ventricular   relaxation (grade 1 diastolic dysfunction).      Right Atrium   Right atrial size was normal.      Right Ventricle   The right ventricular size was normal with normal systolic function and   wall thickness.      Pericardial Effusion   The pericardium was normal in appearance with no evidence of a pericardial   effusion.   There is moderate Left pleural effusion.      Pleural Effusion   No evidence of pleural effusion.       Cultures    Procalcitonin  Lab Results   Component Value Date    PROCAL 0.29 12/17/2017    PROCAL 0.64 12/14/2017    PROCAL 0.06 01/10/2015     Influenza A/B (-)  Blood cx x 2 (-) prelim. Urine: e-coli  Legionella/strep antigen negative  MRSA screening: negative  VRE screening: negative  Pleural fluid: No growth to date  Pleural fluid cytology: Not sent     Radiology    CXR    CXR 12/19 and 12/18 with 12/17/17   1. Moderate cardiomegaly. Metallic sternotomy sutures. Small caliber pigtail catheter in the pleural space left side. No pneumothorax seen. 2. Large area of increased soft tissue density left mid and lower lung fields which appears be due to combination of atelectasis/pneumonia and a pleural effusion. Overall appearance slightly worse than prior.             CTA chest: 12/15/17  FINDINGS:   Lungs: There is bilateral multilobar atelectasis. There is left lower lobe consolidation felt to represent passive atelectasis related to the effusion. A component of pneumonia in the left lower lobe is not excluded. .       Pleura: There is a moderate multiloculated left pleural effusion. There is no pneumothorax.       Heart: There is mild cardiomegaly. Patient is status post sternotomy for CABG surgery.       Pulmonary vasculature: There is adequate opacification of the pulmonary arteries. There is no pulmonary embolism.       Bella and mediastinum: There is mild subcarinal adenopathy. No hilar adenopathy. There are left hilar calcified lymph nodes.       Thoracic aorta/vascular:? There is no thoracic aortic aneurysm or dissection. There is a probable severe stenosis of the origin of the left common carotid artery. .       Imaged upper abdomen: There is a splenic calcified granuloma. Remainder the imaged upper abdomen is unremarkable.       Musculoskeletal system: There is bilateral glenohumeral joint DJD. There is a nearly 1 cm well-defined corticated calcification anteromedial to the left glenohumeral joint likely representing an intra-articular loose body.  There are multilevel bridging    thoracic vertebral endplate osteophytes consistent with DISH.       Chest/body wall soft tissues: Unremarkable       Thyroid: Unremarkable       Impression       No acute pulmonary embolism. Moderate multiloculated left pleural effusion. Bilateral multilobar atelectasis. Component of pneumonia in the left lower lobe not excluded. Probable severe stenosis of the origin of the left common carotid artery. Additional findings as detailed above. CT chest WO: 12/14/17  FINDINGS: The heart is mildly enlarged. Atherosclerotic calcifications are present in the thoracic aorta and coronary arteries without evidence of aneurysm. There is no pericardial effusion. There is a small partially loculated left-sided pleural    effusion. There are areas of adjacent lung consolidation. In addition, there are more extensive alveolar opacities in the left lung. There is diffuse and irregular subpleural thickening, primarily at the lateral mid left hemithorax. There is a 2.5 cm    subpleural density at the posterior left mid lung (image 27). Calcified granulomas present in the left upper lobe. There are minimal reticular opacities in the right lower lung. There is no mediastinal, hilar or axillary lymphadenopathy. However,    evaluation is limited due to absence of contrast. Degenerative changes are present in the thoracic spine without evidence of aggressive osseous lesions.       Upper abdominal organs are evaluated on same-day CT of the abdomen and pelvis.           Impression   1. Partially loculated small left-sided pleural effusion with adjacent lung atelectasis/infiltrate. 2. Irregular pleural thickening in the left hemithorax highly suspicious for an infiltrating neoplastic process. 3. Focal subpleural nodule at the posterior left mid lung, possibly a pseudotumor secondary to trapped pleural fluid. However, malignancy cannot be excluded. 4. Minimal right basilar atelectasis/infiltrate. 5. Cardiomegaly       CT abd/pelvis 12/14/17       1.  There is a fluid collection at the left lung base with overlying patchy density. The fluid collection may represent a loculated effusion versus empyema. Overlying patchy density may represent atelectasis or infectious infiltrate.       2. No acute abnormality is identified within the abdomen or pelvis. (See actual reports for details)    Assessment   CAP, LLL with effusion (parapneumonic) s/p Pig tail cathter placement: Exudate by TP criteria. CXR and oxygenation are improved but today's CXR shows catheter tip to be above LLL consolidation vs loculated effusion (no drain output)  Acute Hypercapneic/Hypoxic Respiratory Failure: Improving and refusing BiPAP. UTI: E-coli on Rocephin  Acute on Chronic Diastolic HF   Irregular pleural thickening in the left hemithorax- ? Loculated pleural effusion  Focal subpleural nodule at the posterior left mid lung, possibly a pseudotumor secondary to trapped pleural fluid  Probable COPD with smoking hx: No signs of acute exacerbation  Possible JAROD (sleep questionnaire done and study ordered)  Tobacco Abuse with 1 PPD  CAD with CABG elevated troponin: For stress test today. Hx CVA with aphasia/dyspagia 2012  Type I DM: fluctuating sugars  Essential HTN: Controlled  PAD with RBKA  Deconditioning  Full Code  Recommendations   Spoke with Dr. Karen Davis: Called IR to see if they can reposition the catheter. They recommended repeating US first to determine fluid vs consolidation. Will not be able to reposition catheter, will need pulled and repeat procedure done. Await US findings. Pull catheter. Follow fluid cultures  Wean 02 to to keep sats >90%  Await stress test results  Continue current ATB (Stop po Zithromax after today) (Pam Regulus stopped yesterday)  Continue BiPAP PRN (refusing)  ABG PRN  Continue Duo Nebs/MetaNebs every 4 hours WA  IS/accapella  Nicotine patch  Speech therapy following  Continue PT/OT  DVT prophylaxis: Lovenox  Outpatient sleep study ordered      Case discussed with nurse/patient and Dr. Shelbie Tracy and Petar.   Questions and concerns addressed. Meds and Orders reviewed. Electronically signed by   Luca Gaming CNP on 12/19/2017 at 8:27 AM     Addendum by Dr. Carlo Mejia MD:  I have seen and examined the patient independently. Face to face evaluation and examination was performed. The above evaluation and note has been reviewed. Labs and radiographs were reviewed. I Have discussed with Ms. Vanessa Chand. JOSEP Escobar about this patient in detail. D/w Patient's R.N. and specific instructions given. Patient issues addressed. The above assessment and plan has been reviewed. Please see my modifications mentioned below. My modifications:   Will do CT chest with IV contrast.    Neo Villegas MD 12/19/2017 7:07 PM

## 2017-12-19 NOTE — PROGRESS NOTES
Conjunctivae/corneas clear. Neck: Supple, with full range of motion. No jugular venous distention. Trachea midline. Respiratory:  Normal respiratory effort. Dull to percussion left  Cardiovascular: Regular rate and rhythm with normal S1/S2 without murmurs, rubs or gallops. Abdomen: Soft, non-tender, non-distended with normal bowel sounds. Musculoskeletal:amputation  Skin: Skin color, texture, turgor normal.  No rashes or lesions. Neurologic:   Slight right weakness. Speech slow  Psychiatric: Alert and oriented, thought content appropriate, normal insight      Labs:   Recent Labs      12/17/17 0428  12/18/17   0321   WBC  9.8  8.9   HGB  11.6*  11.9*   HCT  34.4*  35.7*   PLT  164  179     Recent Labs      12/17/17 0428 12/18/17 0321   NA  139  138   K  3.9  3.8   CL  102  100   CO2  25  24   BUN  25*  18   CREATININE  1.4*  1.0   CALCIUM  8.4*  9.0     Recent Labs      12/17/17 0428   AST  30   ALT  19   BILITOT  0.3   ALKPHOS  153*     No results for input(s): INR in the last 72 hours. No results for input(s): Dionne Rumps in the last 72 hours. Urinalysis:    Lab Results   Component Value Date    NITRU NEGATIVE 12/14/2017    WBCUA 25-50 12/14/2017    WBCUA 11-20 12/13/2015    BACTERIA MODERATE 12/14/2017    RBCUA 5-10 12/14/2017    BLOODU LARGE 12/14/2017    SPECGRAV 1.010 01/09/2015    GLUCOSEU >= 1000 12/14/2017       Radiology:  XR CHEST STANDARD (2 VW)   Final Result   1. Moderate cardiomegaly. Metallic sternotomy sutures. Small caliber pigtail catheter in the pleural space left side. No pneumothorax seen. 2. Large area of increased soft tissue density left mid and lower lung fields which appears be due to combination of atelectasis/pneumonia and a pleural effusion. Overall appearance slightly worse than prior. **This report has been created using voice recognition software. It may contain minor errors which are inherent in voice recognition technology. **      Final report electronically signed by Dr. Almas Boone on 12/19/2017 10:17 AM      XR Chest Portable   Final Result      Interval placement of a left pleural pigtail catheter with significant improvement in the large left pleural effusion. New mid left lung atelectasis. Persistent left basilar opacity as discussed above. Stable interstitial pulmonary edema and pulmonary venous congestion. **This report has been created using voice recognition software. It may contain minor errors which are inherent in voice recognition technology. **      Final report electronically signed by Dr. Butch Martinez on 12/18/2017 7:09 AM      CT GUIDED NEEDLE PLACEMENT   Final Result   Status post successful left sided 8 [place] locking pigtail drainage catheter placement into the left pleural space. **This report has been created using voice recognition software. It may contain minor errors which are inherent in voice recognition technology. **         Final report electronically signed by Dr. Alex Chino on 12/17/2017 3:21 PM      CT Guided Chest Tube   Final Result   Status post successful left sided 8 [place] locking pigtail drainage catheter placement into the left pleural space. **This report has been created using voice recognition software. It may contain minor errors which are inherent in voice recognition technology. **         Final report electronically signed by Dr. Alex Chino on 12/17/2017 3:21 PM      XR Chest Portable   Final Result      Large left pleural effusion. Underlying volume loss likely, cannot exclude pneumonia. Medial right basilar atelectasis. Cardiomegaly status post CABG surgery. **This report has been created using voice recognition software. It may contain minor errors which are inherent in voice recognition technology. **      Final report electronically signed by Dr. Butch Martinez on 12/17/2017 5:44 AM      CTA CHEST W [address]   Final Result      No acute pulmonary embolism. Moderate multiloculated left pleural effusion. Bilateral multilobar atelectasis. Component of pneumonia in the left lower lobe not excluded. Probable severe stenosis of the origin of the left common carotid artery. Additional findings as detailed above. **This report has been created using voice recognition software. It may contain minor errors which are inherent in voice recognition technology. **      Final report electronically signed by Dr. Martha Howard on 12/15/2017 2:55 AM      CT CHEST WO CONTRAST   Final Result   1. Partially loculated small left-sided pleural effusion with adjacent lung atelectasis/infiltrate. 2. Irregular pleural thickening in the left hemithorax highly suspicious for an infiltrating neoplastic process. 3. Focal subpleural nodule at the posterior left mid lung, possibly a pseudotumor secondary to trapped pleural fluid. However, malignancy cannot be excluded. 4. Minimal right basilar atelectasis/infiltrate. 5. Cardiomegaly      Final report electronically signed by Dr. Hosea Ramos on 12/14/2017 5:01 PM      CT ABDOMEN PELVIS W IV CONTRAST Additional Contrast? Oral   Final Result      1. There is a fluid collection at the left lung base with overlying patchy density. The fluid collection may represent a loculated effusion versus empyema. Overlying patchy density may represent atelectasis or infectious infiltrate. 2. No acute abnormality is identified within the abdomen or pelvis. **This report has been created using voice recognition software. It may contain minor errors which are inherent in voice recognition technology. **      Final report electronically signed by Dr. Marcelino Sorto on 12/14/2017 3:40 PM      CT GUIDED PLEURAL DRAINAGE W CATH PERC    (Results Pending)       Diet: DIET DYSPHAGIA II MECHANICALLY ALTERED; Carb Control: 4 carbs/meal (approximate 1800 kcals/day)    DVT prophylaxis: [x] Lovenox [] SCDs                                 [] SQ Heparin                                 [] Encourage ambulation           [] Already on Anticoagulation     Disposition:    [x] Home       [] TCU       [] Rehab       [] Psych       [] SNF       [] [place]       [] Other-    Code Status: Full Code    PT/OT Eval Status:  active and ongoing      Assessment/Plan:    Anticipated Discharge in : ?    Active Hospital Problems    Diagnosis Date Noted    Generalized abdominal pain [R10.84]     Coronary artery disease involving native coronary artery of native heart without angina pectoris [I25.10]     Acute hypercapnic respiratory failure (HCC) [J96.02]     Pleural effusion, left [J90]     Pneumonia due to organism [J18.9] 12/14/2017    Acute cystitis [N30.00] 12/14/2017    Pleural mass [J94.9] 12/14/2017    Elevated troponin [R74.8] 12/14/2017    Hypoxia [R09.02] 12/14/2017    Complex regional pain syndrome type 1 of both lower extremities [G90.523]     Tobacco abuse [Z72.0] 12/06/2016    Insulin dependent type 2 diabetes mellitus, uncontrolled ([facility] [E11.65, Z79.4] 08/06/2015    Peripheral vascular disease ([facility] [I73.9]     CAD (coronary artery disease) [I25.10] 10/29/2014    Benign essential HTN [I10] 10/29/2014    Schizoaffective disorder ([facility] [F25.9] 03/02/2014       1. Pneumonia- catheter appears above density- discussed with pulmonary- plan to reposition; if no help, cvs consult  2.  Will hold antiplatelets, anticoagulation till further plans        Electronically signed by Dago Handley MD on 12/19/2017 at 11:25 AM

## 2017-12-19 NOTE — CARE COORDINATION
12/19/17  12:33 PM    Ramya Lundberg day: 5  Location: -21/021-A Reason for admit: PNA (pneumonia) [J18.9]   Clinical update: Day 5 of stay for pneumonia. 12/16 Pt lethargic d/t pain meds being given; lyrica and percocet held. 12/17 S/P IR placement of pigtail catheter CT for pleural effusion, 400 ml drained from left side. PT/OT following and recommends rehab prior to going home. On Vanc/Rocephin. 12/18 Cardiology consulted, \"NOT NSTEMI\" documented, but patient on core MI meds already. Pigtail catheter with no drainage. Vancomycin to stop.   12/19 Stress test today, PT/OT following, [email or web address], IV Rocephin continues. SCDs ordered. TCU/Rehab and Physiatry ordered to see for debility. Discharge plan: Pt from home alone with home health. She states she has a wheelchair at home.  consulted 12/15 and  stated patient is current with [facility].

## 2017-12-19 NOTE — PROCEDURES
Metaneb therapy was given to the patient. Vitals before treatment:    heart rate 72   respiratory rate 18  Breath sounds before treatment:  diminished breath sounds- throughout  Medication:  Medication delivered via Metaneb was Duoneb. Continuous High Frequency Oscillation (CHFO)   CHFO was applied to patient. Continuous Positive Expiratory Pressure (CPEP)  CPEP was applied to patient. Vitals during treatment:   heart rate 76   respiratory rate 18  Duration:    Therapy was given for 10 minutes. Tolerance:   Patient tolerated the procedure well.    Vitals after treatment:    heart rate 76   respiratory rate 18  Breath sounds after treatment:  diminished breath sounds- throughout  Cough/sputum:  nonproductive

## 2017-12-19 NOTE — PROGRESS NOTES
[facility]    Patient - Carlos Beavers   MRN -  [id number]   [facility]   - 1960      Date of Admission -  2017 12:12 PM  Date of evaluation -  2017  Room - 3B--GINNY Cortes MD Primary Care Physician - Jesi Young MD   Chief Complaint   ? Lung mass; pneumonia. Active Hospital Problem List      Active Hospital Problems    Diagnosis Date Noted    Generalized abdominal pain [R10.84]     Coronary artery disease involving native coronary artery of native heart without angina pectoris [I25.10]     Acute hypercapnic respiratory failure (HCC) [J96.02]     Pleural effusion, left [J90]     Pneumonia due to organism [J18.9] 2017    Acute cystitis [N30.00] 2017    Pleural mass [J94.9] 2017    Elevated troponin [R74.8] 2017    Hypoxia [R09.02] 2017    Complex regional pain syndrome type 1 of both lower extremities [G90.523]     Tobacco abuse [Z72.0] 2016    Insulin dependent type 2 diabetes mellitus, uncontrolled ([facility] [E11.65, Z79.4] 2015    Peripheral vascular disease ([facility] [I73.9]     CAD (coronary artery disease) [I25.10] 10/29/2014    Benign essential HTN [I10] 10/29/2014    Schizoaffective disorder ([facility] [F25.9] 2014     CHANEL Beavers is a 61 y.o. female admitted for abd and back pain;   Pt lives at home alone and has [facility]; she states that she had a 1 day hx of abd and mid back pain; pt states HH RN gave her a NTG SL for c/o chest pain and EMS was called; pt does have chronic back pain and follows with Dr Natividad Briscoe; pt has a hx of CVA with aphasia so a through hx is difficult to obtain from pt and no family at bedside; she states she smokes 1 PPD; denies any home use of 02.     She states she has had a non productive cough \"off and on\"; she states she has had some shortness of breath; denies any previous hx of lung 06/18/2012    SETPEEP 6.0 12/16/2017     CBC  Recent Labs      12/17/17   0428  12/18/17   0321   WBC  9.8  8.9   RBC  3.59*  3.75*   HGB  11.6*  11.9*   HCT  34.4*  35.7*   MCV  95.8  95.3   MCH  32.3*  31.8*   MCHC  33.7  33.4   RDW  13.5  13.5   PLT  164  179   MPV  9.5  9.6      BMP  Recent Labs      12/17/17   0428  12/18/17   0321   NA  139  138   K  3.9  3.8   CL  102  100   CO2  25  24   BUN  25*  18   CREATININE  1.4*  1.0   GLUCOSE  152*  159*   CALCIUM  8.4*  9.0     LFT  Recent Labs      12/17/17   0428   AST  30   ALT  19   BILITOT  0.3   ALKPHOS  153*     TROP  Lab Results   Component Value Date    TROPONINT 0.024 12/16/2017    TROPONINT 0.015 12/15/2017    TROPONINT 0.016 12/15/2017     BNP  Lab Results   Component Value Date    PROBNP 2714.0 12/14/2017    PROBNP 436.8 01/09/2015     D-Dimer  Lab Results   Component Value Date    DDIMER 1302.00 12/14/2017     Lactic Acid  Recent Labs      12/17/17   0428   LACTA  0.7     INR  No results for input(s): INR, PROTIME in the last 72 hours. PTT  Recent Labs      12/16/17   1856  12/17/17   0428  12/17/17   1039   APTT  61.3*  72.2*  68.6*     Glucose  Recent Labs      12/19/17   0626  12/19/17   1041  12/19/17   1554   POCGLU  183*  240*  206*     UA   Recent Labs      12/18/17   1600   COLORU  DARK YELLOW   .   PFTs   None in Epic  Echo 12/15/17   Normal left ventricle size and systolic function.   Ejection fraction was estimated at 55 to 60 %.   There were no regional left ventricular wall motion abnormalities .   Moderately increased left ventricle wall thickness.   Moderate concentric left ventricular hypertrophy.   Doppler parameters were consistent with abnormal left ventricular   relaxation (grade 1 diastolic dysfunction).   The left atrium is Mildly dilated.   There is moderate Left pleural effusions      Mitral Valve   The mitral valve structure was normal with normal leaflet separation.   DOPPLER: The transmitral velocity was within the normal range with no   evidence for mitral stenosis. Trace mitral regurgitation is present.      Aortic Valve   The aortic valve was trileaflet with normal thickness and cuspal   separation. DOPPLER: Transaortic velocity was within the normal range with   no evidence of aortic stenosis. There was no evidence of aortic   regurgitation.      Tricuspid Valve   The tricuspid valve structure was normal with normal leaflet separation.   DOPPLER: There was no evidence of tricuspid stenosis.   Trivial tricuspid regurgitation visualized.      Pulmonic Valve   The pulmonic valve leaflets exhibited normal thickness, no calcification,   and normal cuspal separation. DOPPLER: The transpulmonic velocity was   within the normal range with no evidence for regurgitation.      Left Atrium   The left atrium is Mildly dilated.      Left Ventricle   Normal left ventricle size and systolic function. Ejection fraction was   estimated at 55 to 60 %. There were no regional left ventricular wall   motion abnormalities .   Moderately increased left ventricle wall thickness.   Moderate concentric left ventricular hypertrophy.   Doppler parameters were consistent with abnormal left ventricular   relaxation (grade 1 diastolic dysfunction).      Right Atrium   Right atrial size was normal.      Right Ventricle   The right ventricular size was normal with normal systolic function and   wall thickness.      Pericardial Effusion   The pericardium was normal in appearance with no evidence of a pericardial   effusion.   There is moderate Left pleural effusion.      Pleural Effusion   No evidence of pleural effusion.       Cultures    Procalcitonin  Lab Results   Component Value Date    PROCAL 0.29 12/17/2017    PROCAL 0.64 12/14/2017    PROCAL 0.06 01/10/2015     Influenza A/B (-)  Blood cx x 2 (-) prelim.   Urine: e-coli  Legionella/strep antigen negative  MRSA screening: negative  VRE screening: negative  Pleural fluid: No growth to date  Pleural fluid multilobar atelectasis. Component of pneumonia in the left lower lobe not excluded. Probable severe stenosis of the origin of the left common carotid artery. Additional findings as detailed above. CT chest WO: 12/14/17  FINDINGS: The heart is mildly enlarged. Atherosclerotic calcifications are present in the thoracic aorta and coronary arteries without evidence of aneurysm. There is no pericardial effusion. There is a small partially loculated left-sided pleural    effusion. There are areas of adjacent lung consolidation. In addition, there are more extensive alveolar opacities in the left lung. There is diffuse and irregular subpleural thickening, primarily at the lateral mid left hemithorax. There is a 2.5 cm    subpleural density at the posterior left mid lung (image 27). Calcified granulomas present in the left upper lobe. There are minimal reticular opacities in the right lower lung. There is no mediastinal, hilar or axillary lymphadenopathy. However,    evaluation is limited due to absence of contrast. Degenerative changes are present in the thoracic spine without evidence of aggressive osseous lesions.       Upper abdominal organs are evaluated on same-day CT of the abdomen and pelvis.           Impression   1. Partially loculated small left-sided pleural effusion with adjacent lung atelectasis/infiltrate. 2. Irregular pleural thickening in the left hemithorax highly suspicious for an infiltrating neoplastic process. 3. Focal subpleural nodule at the posterior left mid lung, possibly a pseudotumor secondary to trapped pleural fluid. However, malignancy cannot be excluded. 4. Minimal right basilar atelectasis/infiltrate. 5. Cardiomegaly       CT abd/pelvis 12/14/17       1. There is a fluid collection at the left lung base with overlying patchy density. The fluid collection may represent a loculated effusion versus empyema.  Overlying patchy density may represent atelectasis or infectious MD Petar:  I have seen and examined the patient independently. Face to face evaluation and examination was performed. The above evaluation and note has been reviewed. Labs and radiographs were reviewed. I Have discussed with Ms. Vida Escobar CNP about this patient in detail. D/w Patient's R.N. and specific instructions given. Patient issues addressed. The above assessment and plan has been reviewed. Please see my modifications mentioned below. My modifications:  U/S of chest- Prelim by me- no significant pleural fluid.  -Will do CT chest with IV contast to further evaluate abnormal chest Xray. -Discussed with Ms. Bandar Glass RN taking care of patient regarding patient condition and my management plan.     Kavya Lindsay MD 12/19/2017 6:48 PM

## 2017-12-19 NOTE — PROGRESS NOTES
RCP stuck pt 2 times for ABG draw and did not obtain specimen. During 2nd attempt pt stated, \"Please no more. \" Pt refused for another RCP to come draw ABG.

## 2017-12-19 NOTE — FLOWSHEET NOTE
12/19/17 1530   Encounter Summary   Services provided to: Patient   Referral/Consult From: Jonah Saha Visiting Yes  (12/19/17 Patient was sleeping)   Complexity of Encounter Moderate   Length of Encounter 15 minutes   Spiritual Assessment Completed Yes   Routine   Type Follow up   Assessment Sleeping   Intervention Sustaining presence/ Ministry of presence;Prayer   Outcome Expressed gratitude   Spiritual/Anabaptism   Type Spiritual support      visited patient during rounding. At the time of entry,  found patient sleeping. There were no family members available. This  offered prayer and also provided ministry of sustaining presence. Patient was unable to respond. This  will follow up for continue emotional and spiritual support including prayer.

## 2017-12-19 NOTE — PROGRESS NOTES
[address] [facility] 3B    TIME   SLP Individual Minutes  Time In: 1796  Time Out: 4591  Minutes: 12  Timed Code Treatment Minutes: 0 Minutes       [x]Daily Note  []Progress Note  []Discharge Note    Date: 2017  Patient Name: Danna Albert        MRN: [id number]    : 1960  (61 y.o.)  Gender: female   Primary Provider: Yuni Snell MD  Admitting Diagnosis:  Pneumonia   Secondary Diagnosis: Dysphagia  Precautions: Aspiration  Swallowing Status/Diet: Dysphagia II with thin   Swallowing Strategies: Upright position  DATE of last BSE:  12/15    Subjective: Pt seen resting in bed. Very significant flat affect and poor overall engagement with ST this date. Pt growing mildly agitated with ST attempts to have pt complete multiple PO trials for assessment of ability to upgrade to least restrictive PO diet as pt \"just ate breakfast\". Pt requesting to rest. No family present. SHORT TERM GOAL #1:  Goal 1: Pt will tolerate Dysphagia II and thin diet without overt aspiration to ensure safe and adequate nutrition and hydration  INTERVENTIONS: RN, Sallye Felty reporting pt tolerating a dysphagia II diet and thin liquids without overt difficulty. Does note some consolidation in left lower lung fields that is NOT felt to be aspiration related. Will continue follow up and determine need for further instrumental assessment. SHORT TERM GOAL #2:  Goal 2: Pt will tolerate trials of advanced solids without overt aspiration to 10/10 tmes to advance pt to least restricitive diet. INTERVENTIONS: Pt agreeable to ~5 bites of cracker with peanut butter. Pt with slow mastication and occasional absence of appropriate rotary chewing pattern with completion of vertical mastication intermittently noted. Pt reluctant to allow ST to look in oral cavity to determine appropriate oral clearance.  Following encouragement, pt did comply with appropriate oral clearance following extra time

## 2017-12-19 NOTE — CONSULTS
Physical Medicine & Rehabilitation   Consult Note      Admitting Physician: Arron Kayser, MD    Primary Care Provider: Liam Jonas MD     Reason for Consult:  Debility r/t pneumonia. Hx R BKA and CVA    History of Present Illness:  Jose Mina is a 61 y.o., Right-handed, white, female admitted to [facility] on 12/14/2017. Patient presented to [facility] ER with abdominal pain that began the day prior and was worsening. Patient also reported cough, diarrhea, lack of appetite. Patient with chronic back pain due to degenerative disc disease and follows with Dr. Sherri Fields. Patient was found to have acute cystitis, elevated troponin, and LLL pneumonia. Patient was admitted for further treatment. Patient was found to have left pleural effusion and pig tail drain was placed. Patient underwent stress test on 12/19/17. Order for drain to be pulled when output less than 100ml in 24 hours. Patient with previous CVA in 2012 which left her with residual aphasia and dysphagia. Patient's aphasia makes questioning difficult. Patient with R BKA in 2014. Patient was on IP Rehab at that time and transitioned home. Patient obtained a prosthetic for her right leg. Patient has not been using at home. Patient doesn't recall when she stopped using prosthetic but states it was because she had pain in her left leg when she did stop using it. Patient states she is interested in using her prosthetic again and working on mobility. Discussed with patient about staying in IP Rehab at discharge to work on this along with strengthening and endurance after dealing with pneumonia and pleural effusion. Patient understanding, but she is really wanting to go home at discharge. Patient states she would like to be home for [place]. RN indicates she is not ready for discharge today. Explained to patient to consider staying in [address] [place] [address] and we would continue communications regarding this.  Did let patient know she would need to have someone bring in her prosthetic for IP Rehab if she were to agree to that. She currently reports that her Right lower limb pain is at a 3 and her Left lower limb pain is at an 8. She also reported chronic bilateral hand pain at a 4. These pain ratings are with respect to a pain scale of 1-10 with 1 being no pain and 10 being intolerable. She noted no other trunk or limb pain. She noted no limb or trunk numbness. Current Rehabilitation Assessments:  Physical therapy:   FIMS:  Bed Mobility: Scooting: Contact guard assistance (advancing hips to EOB)  Transfers: Sit to Stand: Moderate Assistance (from edge of bed with RW, stands ~5 seconds prior to impulsively sitting down; pt stands with increased trunk and L knee flexion)  Stand Pivot Transfers: Moderate Assistance (x 2 without AD bed to recliner),  ,    FIMS:  , PT Equipment Recommendations  Equipment Needed: No, Assessment: Pt admitted due to pneumonia. Pt tolerates session fair, limited by cognition, weakness and decreased endurance, on 4 L O2. Pt demonstrates decreased strength, bed mobility, transfers, balance and activity tolerance indicating the need for skilled PT services. Occupational therapy:   Overall Cognitive Status: Exceptions  Arousal/Alertness: Delayed responses to stimuli  Following Commands:  Follows one step commands with increased time, Follows one step commands with repetition  Attention Span: Difficulty attending to directions, Attends with cues to redirect  Memory: Decreased short term memory, Decreased long term memory, Decreased recall of precautions  Safety Judgement: Decreased awareness of need for assistance, Decreased awareness of need for safety  Problem Solving: Decreased awareness of errors, Assistance required to identify errors made, Assistance required to implement solutions  Insights: Decreased awareness of deficits  Initiation: Requires cues for some  Sequencing: Requires cues for some  Cognition 5/8/12    normal LV size and fx, mildly intense perfusion defect of the anterior wall which is reversible on rest images.  CHOLECYSTECTOMY      CORONARY ARTERY BYPASS GRAFT  6/2012    FOOT SURGERY Right 07/01/2014    DEBRIDEMENT HEEL    HYSTERECTOMY      LEG AMPUTATION BELOW KNEE Right 7/7/14    Dr. Vidal Anderson N/A 9/7/2017    LUMBAR SNRB performed by Rowena Motta MD at [address] [place] [address] OTHER SURGICAL HISTORY Right 10/31/14    Debridement of Right Below Knee Amputation Stump and Reclosure of Stump - Dr. Jah Sohail Bilateral 02/28/2017    Lumbar Facet MBB L3-4, L4-5, L5-S1    OTHER SURGICAL HISTORY Left 04/11/2017    Lumbar Sympathetic Block, Left Side    OTHER SURGICAL HISTORY Bilateral 09/07/2017    lumbar smpathetic nerve block bilateral    TRANSTHORACIC ECHOCARDIOGRAM  4/23/12    EF 55-60% mild mitral regurg       Allergies:     Allergies   Allergen Reactions    Latex      \"eats her skin\"    Lorazepam Anxiety    Gabapentin      itching    Wellbutrin [Bupropion] Other (See Comments)     itching    Tape Chin Sample Tape] Rash        Current Medications:   Current Facility-Administered Medications: enoxaparin (LOVENOX) injection 40 mg, 40 mg, Subcutaneous, Daily  metFORMIN (GLUCOPHAGE) tablet 500 mg, 500 mg, Oral, BID WC  insulin lispro (HUMALOG) injection vial 0-18 Units, 0-18 Units, Subcutaneous, TID WC  insulin lispro (HUMALOG) injection vial 0-9 Units, 0-9 Units, Subcutaneous, Nightly  pregabalin (LYRICA) capsule 75 mg, 75 mg, Oral, Q6H  nicotine (NICODERM CQ) 21 MG/24HR 1 patch, 1 patch, Transdermal, Daily  oxyCODONE-acetaminophen (PERCOCET) 5-325 MG per tablet 1 tablet, 1 tablet, Oral, Q8H PRN  ipratropium-albuterol (DUONEB) nebulizer solution 1 ampule, 1 ampule, Inhalation, Q4H WA  aspirin EC tablet 81 mg, 81 mg, Oral, Daily  clopidogrel (PLAVIX) tablet 75 mg, 75 mg, Oral, Daily  atorvastatin (LIPITOR) tablet 40 mg, 40 mg, Oral, Nightly  ipratropium-albuterol (DUONEB) nebulizer solution 1 ampule, 1 ampule, Inhalation, Q4H PRN  insulin glargine (LANTUS) injection vial 28 Units, 28 Units, Subcutaneous, Daily  metoprolol tartrate (LOPRESSOR) tablet 25 mg, 25 mg, Oral, BID  dextrose 5 % and 0.9 % sodium chloride infusion, , Intravenous, PRN  cefTRIAXone (ROCEPHIN) 1 g in sterile water 10 mL IV syringe, 1 g, Intravenous, Q24H  glucose (GLUTOSE) 40 % oral gel 15 g, 15 g, Oral, PRN  dextrose 50 % solution 12.5 g, 12.5 g, Intravenous, PRN  glucagon (rDNA) injection 1 mg, 1 mg, Intramuscular, PRN  sodium chloride flush 0.9 % injection 10 mL, 10 mL, Intravenous, 2 times per day  sodium chloride flush 0.9 % injection 10 mL, 10 mL, Intravenous, PRN  acetaminophen (TYLENOL) tablet 650 mg, 650 mg, Oral, Q4H PRN  magnesium hydroxide (MILK OF MAGNESIA) 400 MG/5ML suspension 30 mL, 30 mL, Oral, Daily PRN  ondansetron (ZOFRAN) injection 4 mg, 4 mg, Intravenous, Q6H PRN  famotidine (PEPCID) tablet 20 mg, 20 mg, Oral, BID  DULoxetine (CYMBALTA) extended release capsule 30 mg, 30 mg, Oral, Daily  nitroGLYCERIN (NITROSTAT) SL tablet 0.4 mg, 0.4 mg, Sublingual, Q5 Min PRN    Social History:  Social History     Social History    Marital status: Single     Spouse name: N/A    Number of children: N/A    Years of education: N/A     Occupational History    Not on file.      Social History Main Topics    Smoking status: Current Every Day Smoker     Packs/day: 0.50     Years: 34.00     Types: Cigarettes    Smokeless tobacco: Never Used      Comment: pt using vapor cigarettes as well    Alcohol use No    Drug use: No    Sexual activity: No     Other Topics Concern    Not on file     Social History Narrative    No narrative on file     Lives With: Alone  Type of Home: House  Home Layout: One level  Home Access: Ramped entrance  Home Equipment: [address], 4 wheeled walker      Bathroom Shower/Tub: Tub/Shower unit  [place] Toilet: Handicap height  Bathroom Equipment: Shower chair     Receives Help From: Home health  ADL Assistance: Needs assistance (per pt report)  Bath: Minimal assistance  Dressing: Minimal assistance  Grooming: Modified independent   Feeding: Modified independent   Toileting: Independent  Homemaking Assistance: Needs assistance (HHA completes all)     Ambulation Assistance: Needs assistance (primarily uses w/c)  Transfer Assistance: Independent     Active : No  Additional Comments: Question accuracy of pt reported PLOF/home environment due to contradicting information given. Pt reported had RLE prosthetic although due to frequent falls when using no llonger uses. Pt reported primarily uses w/c for mobility, pivots to/from. Pt stated does not have home O2. Family History:       Problem Relation Age of Onset    Diabetes Mother     Stroke Mother     Cancer Mother     Heart Disease Father     Cancer Maternal Grandmother        Review of Systems:  CONSTITUTIONAL:  negative  EYES:  negative  HEENT:  negative  RESPIRATORY:  positive for cough with sputum and dyspnea with exertion  CARDIOVASCULAR:  positive for chest pain day before admission  GASTROINTESTINAL:  negative  GENITOURINARY:  [place] in place  SKIN:  negative  HEMATOLOGIC/LYMPHATIC:  negative  MUSCULOSKELETAL:  positive for pain, right BKA and muscle weakness  NEUROLOGICAL:  positive for speech problems, gait problems, dysphagia and weakness  BEHAVIOR/PSYCH:  negative  10 point system review otherwise negative    Physical Exam:  /76   Pulse 78   Temp 98.4 °F (36.9 °C) (Oral)   Resp 20   Ht 5' 5\" (1.651 m)   Wt 189 lb 4.8 oz (85.9 kg)   LMP  (LMP Unknown)   SpO2 93%   BMI 31.50 kg/m²   She was noted to be awake, alert, and in no acute distress. Orientation:   person, place, and month. She was not oriented to the day of the week or the year.   Mood: anxious and sad  Affect: sad , anxious, flat and tearful  General appearance: Patient is

## 2017-12-19 NOTE — PROGRESS NOTES
[address] [place] [address]  INPATIENT PHYSICAL THERAPY  DAILYNOTE  [facility] CCU-STEPDOWN 3B    Time In: 1330  Time Out: 1425  Timed Code Treatment Minutes: 54 Minutes  Minutes: 55          Date: 2017  Patient Name: Olivier Ascencio,  Gender:  female        MRN: [id number]  : 1960  (61 y.o.)     Referring Practitioner: Shannan Munguia CNP  Diagnosis: Pneumonia  Additional Pertinent Hx: The patient is a 61 y.o. female with h/o Anxiety; PVD, S/p right BKA, CAD, s/p CABG, s/p CVA,  Diabetes, Chronic back pain; Chronic pain; Depression; Fibromyalgia; Hyperlipidemia; Hypertension;  Neuropathy, RSD, Schizoaffective disorder, Stroke with Receptive aphasia who presents to the Emergency Department for the evaluation of abdominal and back pain. Patient is not able to give any history and daughter is no longer at the bedside. Per ED chart review, her abdominal pain started yesterday,  is intermittent and is made worse when she moves. Patient is not ambulating and uses wheelchair to get around. She does have home health nurses provide periodic care at her home. Per nursing staff on the medical floor, patient's daughter notified her that patient had taken her home percocet and Metformin in the ED without notifying ED staff. Patient's daughter also states that patient is prone to over medicating herself with her pain med. Patient shows me her amputated limb when I asked if she had any pain. Patient was noted to have complained of chest pain yesterday and was given Nitro by home health nurse. Patient's troponin is elevated at 0.020 and it is being trended. Patient was hypotensive with SBP- 81/66 so fluid bolus was given. Heparin infusion has been started and Cardiology consulted. Pt to have stress test .      Past Medical History:   Diagnosis Date    Anxiety     Arthritis     Atherosclerotic PVD with ulceration: Left foot ulcer 2013    3/29/2013: BOGDAN RT : 0.50, LT : 0.42     CAD (coronary artery disease) slow to respond and process activity pt on 3L O2 throughout session following session pt asked to return to bed    Pain:   .  Pain Assessment  Pain Level:  (no number given pt c/o of back pain and left LE pain however stated that she has had this for a long time)       Social/Functional:  Lives With: Alone  Type of Home: House  Home Layout: One level  Home Access: Ramped entrance  Home Equipment: [address], 4 wheeled walker     Objective:  Supine to Sit: Minimal assistance (to right side of bed and she did use rail once sitting pt needed CGA to scoot to edge of bed )  Sit to Supine: Contact guard assistance (and once in bed pt needed CGA to reposition hips in bed educated pt need for laying on side per nursing request )    Transfers  Stand Pivot Transfers: Minimal Assistance (pt came to partial stand pivot transfer did remove arm rest of w/c and took several attempts to get her technique down pt completed 2 transfers and required the min assist for safety)  Balance  Comments: sitting edge of bed with supervision while prepping for transfer, standing with UE at support of therapist with min assist however she had a good hold of therapist UEs for support     Wheelchair Activities  Wheelchair Parts Management: No  Propulsion: Yes  Propulsion 1  Propulsion: Manual  Level of Assistance: Minimal assistance  Description/ Details: pt had difficulty getting left LE in contact with the floor due to w/c ht too high pt unable to use her right UE but used left UE and difficulty keeping straight pt on 3L O2 pt propelled 70x3 required rest breaks    Exercises:  Exercises  Comments: pt completed ex for increased strength pt completed supine left ankle pumps, miguel angel glut sets, quad sets, heelsldies, hip abd/add, hip and knee flexion and extension, short arc qauds, attempted bridges on towel roll with much difficulty x 10 reps each             Activity Tolerance:  Activity Tolerance: Patient limited by fatigue;Patient limited by endurance; Patient limited by cognitive status    Assessment: Body structures, Functions, Activity limitations: Decreased functional mobility , Decreased endurance, Decreased balance, Decreased strength, Decreased safe awareness, Decreased cognition  Assessment: pt tolerated session fair, pt cont to be slow to respond and took extra time to complete activity, pt cont to require assist for supine to sit and transfers, pt had difficulty completing w/c mobility however this w/c was too high for her, pt would benefit from cont therapy to work on strength balance and increased independence and safety with functional mobility prior to discharge home   Prognosis: Fair  REQUIRES PT FOLLOW UP: Yes  Discharge Recommendations: Patient would benefit from continued therapy after discharge, Continue to assess pending progress    Patient Education:  Patient Education: POC, recommendation for rehab prior to going home, transfers    Equipment Recommendations:  Equipment Needed: No    Safety:  Type of devices: Left in chair, Call light within reach, Chair alarm in place, Gait belt, Patient at risk for falls, Nurse notified, All fall risk precautions in place  Restraints  Initially in place: No    Plan:  Times per week: 5 X GM  Times per day: Daily  Specific instructions for Next Treatment: B LE strengthening, bed mobility, transfer training, w/c mobility. Current Treatment Recommendations: Strengthening, Neuromuscular Re-education, Safety Education & Training, Balance Training, Endurance Training, Functional Mobility Training, Transfer Training, Patient/Caregiver Education & Training, Wheelchair Mobility Training    Goals:  Patient goals : To go home. Short term goals  Time Frame for Short term goals: 2 weeks  Short term goal 1: Pt to transfer supine <--> sit SBA to enable pt to get in/out of bed. Short term goal 2: Pt to transfer sit <--> stand CGA for increased functional mobility.   Short term goal 3: Pt to transfer sit pivot CGA for increased functional mobility. Short term goal 4: Pt to self propel in manual w/c 200 feet SBA for household mobility. Long term goals  Time Frame for Long term goals : NA due to short length of stay.

## 2017-12-20 ENCOUNTER — APPOINTMENT (OUTPATIENT)
Dept: INTERVENTIONAL RADIOLOGY/VASCULAR | Age: 57
DRG: 193 | End: 2017-12-20
Payer: MEDICARE

## 2017-12-20 ENCOUNTER — APPOINTMENT (OUTPATIENT)
Dept: CT IMAGING | Age: 57
DRG: 193 | End: 2017-12-20
Payer: MEDICARE

## 2017-12-20 PROBLEM — R77.8 ELEVATED TROPONIN: Status: RESOLVED | Noted: 2017-12-14 | Resolved: 2017-12-20

## 2017-12-20 PROBLEM — I65.22 CAROTID STENOSIS, LEFT: Status: ACTIVE | Noted: 2017-12-20

## 2017-12-20 PROBLEM — R09.02 HYPOXIA: Status: RESOLVED | Noted: 2017-12-14 | Resolved: 2017-12-20

## 2017-12-20 PROBLEM — N30.00 ACUTE CYSTITIS: Status: RESOLVED | Noted: 2017-12-14 | Resolved: 2017-12-20

## 2017-12-20 PROBLEM — J94.8 PLEURAL MASS: Status: RESOLVED | Noted: 2017-12-14 | Resolved: 2017-12-20

## 2017-12-20 LAB
ALBUMIN SERPL-MCNC: 2.6 G/DL (ref 3.5–5.1)
ALP BLD-CCNC: 219 U/L (ref 38–126)
ALT SERPL-CCNC: 48 U/L (ref 11–66)
ANION GAP SERPL CALCULATED.3IONS-SCNC: 14 MEQ/L (ref 8–16)
AST SERPL-CCNC: 37 U/L (ref 5–40)
BILIRUB SERPL-MCNC: 0.2 MG/DL (ref 0.3–1.2)
BLOOD CULTURE, ROUTINE: NORMAL
BLOOD CULTURE, ROUTINE: NORMAL
BUN BLDV-MCNC: 16 MG/DL (ref 7–22)
CALCIUM SERPL-MCNC: 9.1 MG/DL (ref 8.5–10.5)
CHLORIDE BLD-SCNC: 99 MEQ/L (ref 98–111)
CO2: 28 MEQ/L (ref 23–33)
CREAT SERPL-MCNC: 0.9 MG/DL (ref 0.4–1.2)
FERRITIN: 262 NG/ML (ref 10–291)
FOLATE: 14.1 NG/ML (ref 4.8–24.2)
GFR SERPL CREATININE-BSD FRML MDRD: 64 ML/MIN/1.73M2
GLUCOSE BLD-MCNC: 122 MG/DL (ref 70–108)
GLUCOSE BLD-MCNC: 151 MG/DL (ref 70–108)
GLUCOSE BLD-MCNC: 178 MG/DL (ref 70–108)
GLUCOSE BLD-MCNC: 200 MG/DL (ref 70–108)
GLUCOSE BLD-MCNC: 216 MG/DL (ref 70–108)
POTASSIUM SERPL-SCNC: 4 MEQ/L (ref 3.5–5.2)
RETICULOCYTE ABSOLUTE COUNT: 1.1 % (ref 0.5–2)
SODIUM BLD-SCNC: 141 MEQ/L (ref 135–145)
TOTAL PROTEIN: 7 G/DL (ref 6.1–8)
VITAMIN B-12: 418 PG/ML (ref 211–911)

## 2017-12-20 PROCEDURE — 94640 AIRWAY INHALATION TREATMENT: CPT

## 2017-12-20 PROCEDURE — 97530 THERAPEUTIC ACTIVITIES: CPT

## 2017-12-20 PROCEDURE — 82746 ASSAY OF FOLIC ACID SERUM: CPT

## 2017-12-20 PROCEDURE — 99232 SBSQ HOSP IP/OBS MODERATE 35: CPT | Performed by: INTERNAL MEDICINE

## 2017-12-20 PROCEDURE — 2140000000 HC CCU INTERMEDIATE R&B

## 2017-12-20 PROCEDURE — 80053 COMPREHEN METABOLIC PANEL: CPT

## 2017-12-20 PROCEDURE — 6370000000 HC RX 637 (ALT 250 FOR IP): Performed by: NURSE PRACTITIONER

## 2017-12-20 PROCEDURE — APPSS30 APP SPLIT SHARED TIME 16-30 MINUTES: Performed by: NURSE PRACTITIONER

## 2017-12-20 PROCEDURE — 97542 WHEELCHAIR MNGMENT TRAINING: CPT

## 2017-12-20 PROCEDURE — 99233 SBSQ HOSP IP/OBS HIGH 50: CPT | Performed by: INTERNAL MEDICINE

## 2017-12-20 PROCEDURE — 71260 CT THORAX DX C+: CPT

## 2017-12-20 PROCEDURE — 97110 THERAPEUTIC EXERCISES: CPT

## 2017-12-20 PROCEDURE — A6258 TRANSPARENT FILM >16<=48 IN: HCPCS

## 2017-12-20 PROCEDURE — 82728 ASSAY OF FERRITIN: CPT

## 2017-12-20 PROCEDURE — 6370000000 HC RX 637 (ALT 250 FOR IP): Performed by: FAMILY MEDICINE

## 2017-12-20 PROCEDURE — 2580000003 HC RX 258: Performed by: FAMILY MEDICINE

## 2017-12-20 PROCEDURE — 6370000000 HC RX 637 (ALT 250 FOR IP): Performed by: INTERNAL MEDICINE

## 2017-12-20 PROCEDURE — 82607 VITAMIN B-12: CPT

## 2017-12-20 PROCEDURE — 94669 MECHANICAL CHEST WALL OSCILL: CPT

## 2017-12-20 PROCEDURE — 6360000002 HC RX W HCPCS: Performed by: INTERNAL MEDICINE

## 2017-12-20 PROCEDURE — 99223 1ST HOSP IP/OBS HIGH 75: CPT | Performed by: THORACIC SURGERY (CARDIOTHORACIC VASCULAR SURGERY)

## 2017-12-20 PROCEDURE — 36415 COLL VENOUS BLD VENIPUNCTURE: CPT

## 2017-12-20 PROCEDURE — 6360000002 HC RX W HCPCS: Performed by: FAMILY MEDICINE

## 2017-12-20 PROCEDURE — 82948 REAGENT STRIP/BLOOD GLUCOSE: CPT

## 2017-12-20 PROCEDURE — 85045 AUTOMATED RETICULOCYTE COUNT: CPT

## 2017-12-20 PROCEDURE — 94760 N-INVAS EAR/PLS OXIMETRY 1: CPT

## 2017-12-20 PROCEDURE — 6360000004 HC RX CONTRAST MEDICATION: Performed by: INTERNAL MEDICINE

## 2017-12-20 RX ADMIN — IPRATROPIUM BROMIDE AND ALBUTEROL SULFATE 1 AMPULE: .5; 3 SOLUTION RESPIRATORY (INHALATION) at 12:38

## 2017-12-20 RX ADMIN — IOPAMIDOL 80 ML: 755 INJECTION, SOLUTION INTRAVENOUS at 12:12

## 2017-12-20 RX ADMIN — DULOXETINE HYDROCHLORIDE 30 MG: 30 CAPSULE, DELAYED RELEASE ORAL at 08:48

## 2017-12-20 RX ADMIN — OXYCODONE HYDROCHLORIDE AND ACETAMINOPHEN 1 TABLET: 5; 325 TABLET ORAL at 20:24

## 2017-12-20 RX ADMIN — FAMOTIDINE 20 MG: 20 TABLET, FILM COATED ORAL at 20:24

## 2017-12-20 RX ADMIN — OXYCODONE HYDROCHLORIDE AND ACETAMINOPHEN 1 TABLET: 5; 325 TABLET ORAL at 09:58

## 2017-12-20 RX ADMIN — PREGABALIN 75 MG: 75 CAPSULE ORAL at 08:48

## 2017-12-20 RX ADMIN — ATORVASTATIN CALCIUM 40 MG: 40 TABLET, FILM COATED ORAL at 20:24

## 2017-12-20 RX ADMIN — INSULIN GLARGINE 32 UNITS: 100 INJECTION, SOLUTION SUBCUTANEOUS at 21:14

## 2017-12-20 RX ADMIN — Medication 10 ML: at 08:50

## 2017-12-20 RX ADMIN — ENOXAPARIN SODIUM 40 MG: 40 INJECTION SUBCUTANEOUS at 08:50

## 2017-12-20 RX ADMIN — CEFTRIAXONE SODIUM 1 G: 10 INJECTION, POWDER, FOR SOLUTION INTRAVENOUS at 17:31

## 2017-12-20 RX ADMIN — FAMOTIDINE 20 MG: 20 TABLET, FILM COATED ORAL at 08:48

## 2017-12-20 RX ADMIN — Medication 10 ML: at 22:08

## 2017-12-20 RX ADMIN — IPRATROPIUM BROMIDE AND ALBUTEROL SULFATE 1 AMPULE: .5; 3 SOLUTION RESPIRATORY (INHALATION) at 15:42

## 2017-12-20 RX ADMIN — METOPROLOL TARTRATE 25 MG: 25 TABLET ORAL at 20:24

## 2017-12-20 RX ADMIN — IPRATROPIUM BROMIDE AND ALBUTEROL SULFATE 1 AMPULE: .5; 3 SOLUTION RESPIRATORY (INHALATION) at 08:02

## 2017-12-20 RX ADMIN — INSULIN LISPRO 6 UNITS: 100 INJECTION, SOLUTION INTRAVENOUS; SUBCUTANEOUS at 13:55

## 2017-12-20 RX ADMIN — ASPIRIN 81 MG: 81 TABLET, COATED ORAL at 08:48

## 2017-12-20 RX ADMIN — PREGABALIN 75 MG: 75 CAPSULE ORAL at 20:23

## 2017-12-20 RX ADMIN — INSULIN LISPRO 3 UNITS: 100 INJECTION, SOLUTION INTRAVENOUS; SUBCUTANEOUS at 08:50

## 2017-12-20 RX ADMIN — METOPROLOL TARTRATE 25 MG: 25 TABLET ORAL at 08:48

## 2017-12-20 ASSESSMENT — PAIN DESCRIPTION - PAIN TYPE: TYPE: CHRONIC PAIN

## 2017-12-20 ASSESSMENT — PAIN DESCRIPTION - LOCATION: LOCATION: LEG;BACK

## 2017-12-20 ASSESSMENT — PAIN SCALES - GENERAL
PAINLEVEL_OUTOF10: 7
PAINLEVEL_OUTOF10: 0
PAINLEVEL_OUTOF10: 8
PAINLEVEL_OUTOF10: 8
PAINLEVEL_OUTOF10: 3

## 2017-12-20 NOTE — PLAN OF CARE
Problem: Impaired respiratory status  Goal: Clear lung sounds  Outcome: Ongoing  Patient was diminished throughout all lung fields. Patient is receiving Duoneb and metaneb to help achieve clear lung sounds.

## 2017-12-20 NOTE — PROGRESS NOTES
[address] [place] [address]  INPATIENT PHYSICAL THERAPY  DAILYNOTE  [facility] CCU-STEPDOWN 3B    Time In: 1330  Time Out: 1425  Timed Code Treatment Minutes: 54 Minutes  Minutes: 55          Date: 2017  Patient Name: Felipe Wilson,  Gender:  female        MRN: [id number]  : 1960  (61 y.o.)     Referring Practitioner: Sammy Galarza CNP  Diagnosis: Pneumonia  Additional Pertinent Hx: The patient is a 61 y.o. female with h/o Anxiety; PVD, S/p right BKA, CAD, s/p CABG, s/p CVA,  Diabetes, Chronic back pain; Chronic pain; Depression; Fibromyalgia; Hyperlipidemia; Hypertension;  Neuropathy, RSD, Schizoaffective disorder, Stroke with Receptive aphasia who presents to the Emergency Department for the evaluation of abdominal and back pain. Patient is not able to give any history and daughter is no longer at the bedside. Per ED chart review, her abdominal pain started yesterday,  is intermittent and is made worse when she moves. Patient is not ambulating and uses wheelchair to get around. She does have home health nurses provide periodic care at her home. Per nursing staff on the medical floor, patient's daughter notified her that patient had taken her home percocet and Metformin in the ED without notifying ED staff. Patient's daughter also states that patient is prone to over medicating herself with her pain med. Patient shows me her amputated limb when I asked if she had any pain. Patient was noted to have complained of chest pain yesterday and was given Nitro by home health nurse. Patient's troponin is elevated at 0.020 and it is being trended. Patient was hypotensive with SBP- 81/66 so fluid bolus was given. Heparin infusion has been started and Cardiology consulted. Pt to have stress test .      Past Medical History:   Diagnosis Date    Anxiety     Arthritis     Atherosclerotic PVD with ulceration: Left foot ulcer 2013    3/29/2013: BOGDAN RT : 0.50, LT : 0.42     CAD (coronary artery disease) sit <--> stand CGA for increased functional mobility. Short term goal 3: Pt to transfer sit pivot CGA for increased functional mobility. Short term goal 4: Pt to self propel in manual w/c 200 feet SBA for household mobility. Long term goals  Time Frame for Long term goals : NA due to short length of stay.

## 2017-12-20 NOTE — CARE COORDINATION
12/20/17, 10:42 AM  Kareem Garcia day: 6  Location: [facility]21/021-A Reason for admit: PNA (pneumonia) [J18.9]   Clinical update:   12/16 Pt lethargic d/t pain meds being given; lyrica and percocet held, zavala inserted 12/15.   12/17 S/P IR placement of pigtail catheter CT for pleural effusion, 400 ml drained from left side. PT/OT following and recommends rehab prior to going home. On Vanc/Rocephin. 12/18 Cardiology consulted, \"NOT NSTEMI\" documented, but patient on core MI meds already. Pigtail catheter with no drainage. Vancomycin to stop.   12/19 Stress test today, PT/OT following, [email or web address], IV Rocephin continues. SCDs ordered. TCU/Rehab and Physiatry ordered to see for debility. 12/20 IV Rocephin continues, PT/OT following, stress test (-), CTA chest done d/t abnormal CXR, results pending, pigtail cath continues (no documented drainage) and [email or web address] continue. Discharge plan: Pt from home alone, has home health, meals on wheels, uses [employer] for transportation. Pt states she has a wheelchair at home. She has [employer] [facility] [employer] for aides 2hrs/day.    TCU/rehab admissions coordinator following; pt is 'going to think about going to rehab.'

## 2017-12-20 NOTE — PROGRESS NOTES
[address]  INPATIENT OCCUPATIONAL THERAPY  STRZ CCU-STEPDOWN 3B  DAILY NOTE    Time:  Time In: 3456  Time Out: 9662  Timed Code Treatment Minutes: 27 Minutes  Minutes: 27          Date: 2017  Patient Name: Jaylan Sanchez,   Gender: female      Room: -21/021-A  MRN: [id number]  : 1960  (61 y.o.)  Referring Practitioner: Florecita Pagan CNP  Diagnosis: PNA  Additional Pertinent Hx: The patient is a 61 y.o. female with h/o Anxiety; PVD, S/p right BKA, CAD, s/p CABG, s/p CVA,  Diabetes, Chronic back pain; Chronic pain; Depression; Fibromyalgia; Hyperlipidemia; Hypertension;  Neuropathy, RSD, Schizoaffective disorder, Stroke with Receptive aphasia who presents to the Emergency Department for the evaluation of abdominal and back pain. Patient is not able to give any history and daughter is no longer at the bedside. Per ED chart review, her abdominal pain started yesterday,  is intermittent and is made worse when she moves. Patient is not ambulating and uses wheelchair to get around. She does have home health nurses provide periodic care at her home. Per nursing staff on the medical floor, patient's daughter notified her that patient had taken her home percocet and Metformin in the ED without notifying ED staff. Patient's daughter also states that patient is prone to over medicating herself with her pain med. Patient shows me her amputated limb when I asked if she had any pain. Patient was noted to have complained of chest pain yesterday and was given Nitro by home health nurse. Patient's troponin is elevated at 0.020 and it is being trended. Patient was hypotensive with SBP- 81/66 so fluid bolus was given. Heparin infusion has been started and Cardiology consulted. Pt to have stress test .      Restrictions/Precautions:  Restrictions/Precautions: General Precautions, Fall Risk, Contact Precautions (MRSA)    Position Activity Restriction  Other position/activity restrictions: FRANKI CASTANEDA h/o CVA with expressive aphasia, confusion      Past Medical History:   Diagnosis Date    Anxiety     Arthritis     Atherosclerotic PVD with ulceration: Left foot ulcer 2013    3/29/2013: BOGDAN RT : 0.50, LT : 0.42     CAD (coronary artery disease)     Chest pressure     Chronic back pain     Chronic pain     Depression     Fibromyalgia     Hyperlipidemia     Hypertension     Mild mitral regurgitation     Neuropathy ([address]     Onychomycosis     RSD (reflex sympathetic dystrophy)     S/P CAB2013: CABG X 3     Schizoaffective disorder ([address]     Stroke ([facility] [address] 12    no deficits    Type II or unspecified type diabetes mellitus without mention of complication, not stated as uncontrolled      Past Surgical History:   Procedure Laterality Date    CARDIAC SURGERY      CARDIOVASCULAR STRESS TEST  12    normal LV size and fx, mildly intense perfusion defect of the anterior wall which is reversible on rest images.  CHOLECYSTECTOMY      CORONARY ARTERY BYPASS GRAFT  2012    FOOT SURGERY Right 2014    DEBRIDEMENT HEEL    HYSTERECTOMY      LEG AMPUTATION BELOW KNEE Right 14    Dr. Lissy Noble N/A 2017    LUMBAR SNRB performed by Micheline Muniz MD at [address] [place] [address] OTHER SURGICAL HISTORY Right 10/31/14    Debridement of Right Below Knee Amputation Stump and Reclosure of Stump - Dr. Tj Baig Bilateral 2017    Lumbar Facet MBB L3-4, L4-5, L5-S1    OTHER SURGICAL HISTORY Left 2017    Lumbar Sympathetic Block, Left Side    OTHER SURGICAL HISTORY Bilateral 2017    lumbar smpathetic nerve block bilateral    TRANSTHORACIC ECHOCARDIOGRAM  12    EF 55-60% mild mitral regurg           Subjective       Subjective: Pt supine in bed upon arrival. Pt agreeable to treatment.  RN ok'ed treatment  Pain:  Pain Assessment  Patient Currently in Pain: Yes (per pt report 7/10 in legs and back)     Objective            Bed mobility  Supine to Sit: Minimal assistance (with head of bed elevated. Pt used LUE to push off bed. Bed mobility toward the right side)  Scooting: Contact guard assistance (with verbal cues)    Transfers  Stand Pivot Transfers: Minimal assistance (with verbal cues for sequenicing. Pt asked \"what are we doing\" verbal cues for technique. Pt reported \" I can do it\". Verbal cues for hand and foot placement and technique to recliner. Seated EOB pt asked where is the wheel chair. )  Transfer Comments: Pt given cues to transfer to recliner. NO wheel chair present in pt rooom. Pt required additional time with transfers. Balance  Sitting Balance: Stand by assistance (seated edge of bed in preparation for stand pivot transfer to recliner)    Standing Balance: Minimal assistance (Pt did not complete total standing posture with stand pivot. Pt transfering from bed to recliner with stand pivot)       Comment: B UE shoulder H. ABD x 10 reps x 1 set, Left UE shoulder flexion, elbow flexion, wrist flexion. Pt when asked stated that the left UE had some pain with shoulder flexion. Left shoulder flexion exercises terminated. Pt demonstrated visual triggering in L UE 3rd digit. Pt showed therapsit.   RN notified     Activity Tolerance:  Activity Tolerance: Treatment limited secondary to decreased cognition    Assessment:     Performance deficits / Impairments: Decreased ADL status, Decreased strength, Decreased safe awareness, Decreased cognition, Decreased endurance, Decreased balance  Prognosis: Fair  Discharge Recommendations: [address] (if pt refuses recommend 24/7 supervision and [place] OT)    Patient Education:  Patient Education:   safety with transfers, transfer technique, Exercises  Equipment Recommendations:  Equipment Needed: No    Safety:  Safety Devices in place: Yes  Type of devices: Call light within reach, Chair

## 2017-12-20 NOTE — PROGRESS NOTES
Metaneb therapy was given to the patient. Vitals before treatment:    heart rate 71  respiratory rate 16  Breath sounds before treatment:  wheezing- scattered  Medication:  Medication delivered via Metaneb was duoneb   Continuous High Frequency Oscillation (CHFO)   CHFO was applied to patient. Continuous Positive Expiratory Pressure (CPEP)  CPEP was applied to patient. Vitals during treatment:   heart rate 72  respiratory rate 17  Duration:    Therapy was given for 10 minutes. Tolerance:   Patient tolerated the procedure well.    Vitals after treatment:    heart rate 17  respiratory rate 17  Breath sounds after treatment:  clear  Cough/sputum:  nonproductive

## 2017-12-20 NOTE — PLAN OF CARE
Problem: Falls - Risk of  Goal: Absence of falls  Outcome: Met This Shift  No fall this shift. Pt is alert and cooperative with use of call light. Call light within reach. Frequent checks and hourly rounds to assess needs    Problem: Risk for Impaired Skin Integrity  Goal: Tissue integrity - skin and mucous membranes  Structural intactness and normal physiological function of skin and  mucous membranes. Outcome: Ongoing  Pt has limited mobility. Turn and reposition every 2 hours and PRN. Encourage  Mobility as tolerates. PT/OT working with pt. Up to wheelchair and to commode with 2 assists    Problem: Pain:  Goal: Pain level will decrease  Pain level will decrease   Outcome: Ongoing  Medicated with percocet for leg pain after exercising with PT. Reassess and medicate as needed  Goal: Control of acute pain  Control of acute pain   Outcome: Ongoing    Goal: Control of chronic pain  Control of chronic pain   Outcome: Ongoing      Problem: Discharge Planning:  Goal: Discharged to appropriate level of care  Discharged to appropriate level of care   Outcome: Ongoing  Pt initially declined rehab but may reconsider. Reevaluate needs daily    Problem: Anxiety/Stress:  Goal: Level of anxiety will decrease  Level of anxiety will decrease   Outcome: Ongoing  Explain all procedures to pt and answer questions. Offer reassurance as needed    Problem: Aspiration:  Goal: Absence of aspiration  Absence of aspiration   Outcome: Ongoing  Speech therapy working with pt. Tolerating dysphagia II diet well. Problem: Cardiac Output - Decreased:  Goal: Hemodynamic stability will improve  Hemodynamic stability will improve   Outcome: Met This Shift  HR and BP stable    Problem: Gas Exchange - Impaired:  Goal: Levels of oxygenation will improve  Levels of oxygenation will improve   Outcome: Ongoing  Pulse ox good on 2L NC. CXR shows persistent opacity left lower lobe. Repeat ultrasound done, now will do CT.     Problem: Serum Glucose Level - Abnormal:  Goal: Ability to maintain appropriate glucose levels will improve to within specified parameters  Ability to maintain appropriate glucose levels will improve to within specified parameters   Outcome: Not Met This Shift  Chemstrips remain high. Continue sliding scale and lantus as ordered    Problem: Nutrition  Goal: Optimal nutrition therapy  Outcome: Ongoing  Adequate oral intake.  Continue to monitor    Problem: DISCHARGE BARRIERS  Goal: Patient's continuum of care needs are met  Outcome: Ongoing

## 2017-12-20 NOTE — PROGRESS NOTES
0800 pt resting in bed assessment completed and charted    1200 pt transported to CT scan per bed    1225 pt back to room 3B 03 [id number] consulted Dr. Jett Arguello via perfect serve awaiting call back    1800 Dr. Jett Arguello up to see pt, note wrote

## 2017-12-20 NOTE — PROGRESS NOTES
[facility]  SPEECH THERAPY MISSED TREATMENT NOTE  STRZ CCU-STEPDOWN 3B      Date: 2017  Patient Name: Amadou Cuenca        MRN: [id number]    : 1960  (61 y.o.)    REASON FOR MISSED TREATMENT:    Pt currently with nursing needs and NPO for procedure today. Will check back on .     Marcelo Estevez M.S. [id number]

## 2017-12-20 NOTE — CONSULTS
Chest pain. 4/29/13  Yes Janine Nieto MD   ibuprofen (ADVIL;MOTRIN) 100 MG/5ML suspension take 40 milliliters by mouth every 8 hours if needed 8/23/17   Wyatt Alvarez CNP   DULoxetine (CYMBALTA) 30 MG extended release capsule Take 1 capsule by mouth daily 12/13/16   Danni Jose MD   metoprolol tartrate (LOPRESSOR) 25 MG tablet Take 1 tablet by mouth 2 times daily 7/6/16   Asher Infante MD   lisinopril (PRINIVIL;ZESTRIL) 20 MG tablet Take 1 tablet by mouth daily 7/6/16   Asher Infante MD   aspirin 81 MG EC tablet Take 81 mg by mouth daily. Historical Provider, MD       Allergies:  Latex; Lorazepam; Gabapentin; Wellbutrin [bupropion]; and Tape [adhesive tape]    Social History:      TOBACCO:   reports that she has been smoking Cigarettes. She has a 17.00 pack-year smoking history. She has never used smokeless tobacco.  ETOH:   reports that she does not drink alcohol. History   Drug Use No         Family History:          Problem Relation Age of Onset    Diabetes Mother     Stroke Mother     Cancer Mother     Heart Disease Father     Cancer Maternal Grandmother        REVIEW OF SYSTEMS:      ROS  Unable to obtain complete ROS, due to expressive aphasia. PHYSICAL EXAM:    BP (!) 148/78   Pulse 73   Temp 97.6 °F (36.4 °C) (Oral)   Resp 16   Ht 5' 5\" (1.651 m)   Wt 180 lb (81.6 kg)   LMP  (LMP Unknown)   SpO2 95%   BMI 29.95 kg/m²     General appearance:  No apparent distress, appears stated age and cooperative. HEENT:  Normal cephalic, atraumatic without obvious deformity. Pupils equal, round, and reactive to light. Extra ocular muscles intact. Conjunctivae/corneas clear. Neck: Supple, with full range of motion. No jugular venous distention. Trachea midline. Respiratory:  Normal respiratory effort. Clear to auscultation, bilaterally without Rales/Wheezes/Rhonchi.   Cardiovascular:  Regular rate and rhythm with normal S1/S2 without murmurs, rubs or

## 2017-12-20 NOTE — PROGRESS NOTES
nitroGLYCERIN    Intake/Output Summary (Last 24 hours) at 12/20/17 0813  Last data filed at 12/20/17 0334   Gross per 24 hour   Intake              750 ml   Output              700 ml   Net               50 ml     Exam:  BP (!) 140/58   Pulse 62   Temp 97.5 °F (36.4 °C) (Oral)   Resp 16   Ht 5' 5\" (1.651 m)   Wt 180 lb (81.6 kg)   LMP  (LMP Unknown)   SpO2 94%   BMI 29.95 kg/m²   General appearance: No apparent distress, appears stated age and cooperative. HEENT: Pupils equal, round, and reactive to light. Conjunctivae/corneas clear. Neck: Supple, with full range of motion. No jugular venous distention. Trachea midline. Respiratory:  Normal respiratory effort. Crackles bilateral .  Cardiovascular: Regular rate and rhythm with normal S1/S2 without murmurs, rubs or gallops. Abdomen: Soft, non-tender, non-distended with normal bowel sounds. Musculoskeletal: No clubbing, cyanosis or edema bilaterally. Labs:   Recent Labs      12/18/17   0321   WBC  8.9   HGB  11.9*   HCT  35.7*   PLT  179     Recent Labs      12/18/17   0321   NA  138   K  3.8   CL  100   CO2  24   BUN  18   CREATININE  1.0   CALCIUM  9.0     No results for input(s): AST, ALT, BILIDIR, BILITOT, ALKPHOS in the last 72 hours. No results for input(s): INR in the last 72 hours. No results for input(s): Ova April in the last 72 hours. Urinalysis:   Lab Results   Component Value Date    NITRU NEGATIVE 12/14/2017    WBCUA 25-50 12/14/2017    WBCUA 11-20 12/13/2015    BACTERIA MODERATE 12/14/2017    RBCUA 5-10 12/14/2017    BLOODU LARGE 12/14/2017    SPECGRAV 1.010 01/09/2015    GLUCOSEU >= 1000 12/14/2017     Radiology:  US CHEST INCLUDING MEDIASTINUM   Final Result      XR CHEST STANDARD (2 VW)   Final Result   1. Moderate cardiomegaly. Metallic sternotomy sutures. Small caliber pigtail catheter in the pleural space left side. No pneumothorax seen.    2. Large area of increased soft tissue density left mid and lower lung fields which appears be due to combination of atelectasis/pneumonia and a pleural effusion. Overall appearance slightly worse than prior. **This report has been created using voice recognition software. It may contain minor errors which are inherent in voice recognition technology. **      Final report electronically signed by Dr. Yeimy Smith on 12/19/2017 10:17 AM      XR Chest Portable   Final Result      Interval placement of a left pleural pigtail catheter with significant improvement in the large left pleural effusion. New mid left lung atelectasis. Persistent left basilar opacity as discussed above. Stable interstitial pulmonary edema and pulmonary venous congestion. **This report has been created using voice recognition software. It may contain minor errors which are inherent in voice recognition technology. **      Final report electronically signed by Dr. Vicente Galeano on 12/18/2017 7:09 AM      CT GUIDED NEEDLE PLACEMENT   Final Result   Status post successful left sided 8 Hebrew locking pigtail drainage catheter placement into the left pleural space. **This report has been created using voice recognition software. It may contain minor errors which are inherent in voice recognition technology. **         Final report electronically signed by Dr. Eugene Hu on 12/17/2017 3:21 PM      CT Guided Chest Tube   Final Result   Status post successful left sided 8 Hebrew locking pigtail drainage catheter placement into the left pleural space. **This report has been created using voice recognition software. It may contain minor errors which are inherent in voice recognition technology. **         Final report electronically signed by Dr. Eugene Hu on 12/17/2017 3:21 PM      XR Chest Portable   Final Result      Large left pleural effusion. Underlying volume loss likely, cannot exclude pneumonia. Medial right basilar atelectasis.    Cardiomegaly status post CABG surgery. **This report has been created using voice recognition software. It may contain minor errors which are inherent in voice recognition technology. **      Final report electronically signed by Dr. Nadine Cox on 12/17/2017 5:44 AM      CTA CHEST W WO CONTRAST   Final Result      No acute pulmonary embolism. Moderate multiloculated left pleural effusion. Bilateral multilobar atelectasis. Component of pneumonia in the left lower lobe not excluded. Probable severe stenosis of the origin of the left common carotid artery. Additional findings as detailed above. **This report has been created using voice recognition software. It may contain minor errors which are inherent in voice recognition technology. **      Final report electronically signed by Dr. Nadine Cox on 12/15/2017 2:55 AM      CT CHEST WO CONTRAST   Final Result   1. Partially loculated small left-sided pleural effusion with adjacent lung atelectasis/infiltrate. 2. Irregular pleural thickening in the left hemithorax highly suspicious for an infiltrating neoplastic process. 3. Focal subpleural nodule at the posterior left mid lung, possibly a pseudotumor secondary to trapped pleural fluid. However, malignancy cannot be excluded. 4. Minimal right basilar atelectasis/infiltrate. 5. Cardiomegaly      Final report electronically signed by Dr. Jacki Shanks on 12/14/2017 5:01 PM      CT ABDOMEN PELVIS W IV CONTRAST Additional Contrast? Oral   Final Result      1. There is a fluid collection at the left lung base with overlying patchy density. The fluid collection may represent a loculated effusion versus empyema. Overlying patchy density may represent atelectasis or infectious infiltrate. 2. No acute abnormality is identified within the abdomen or pelvis. **This report has been created using voice recognition software.  It may contain minor errors which are inherent in voice recognition

## 2017-12-20 NOTE — PROGRESS NOTES
[facility]  SPEECH THERAPY MISSED TREATMENT NOTE  [facility] CCU-STEPDOWN 3B      Date: 2017  Patient Name: Иван Perales        MRN: [id number]    : 1960  (61 y.o.)    REASON FOR MISSED TREATMENT:    Pt currently NPO for procedure/testing today. Will check back on .     Mayda Samaniego M.S. [id number]

## 2017-12-20 NOTE — PROGRESS NOTES
[facility]    Patient - Jeane Urbina   MRN -  [id number]   [facility] # - [de-identified]   - 1960      Date of Admission -  2017 12:12 PM  Date of evaluation -  2017  Room - 3B--A   Hospital Day - [address] Moshe Steel MD Primary Care Physician - Pamela Barkley MD   Chief Complaint   ? Lung mass; pneumonia. Active Hospital Problem List      Active Hospital Problems    Diagnosis Date Noted    Carotid stenosis, left [I65.22] 2017    Acute on chronic respiratory failure with hypoxia and hypercapnia (HCC) [J96.21, J96.22]     Loculated pleural effusion, Left [J90]     Pneumonia due to organism [J18.9] 2017    Complex regional pain syndrome type 1 of both lower extremities [G90.523]     Tobacco abuse [Z72.0] 2016    Insulin dependent type 2 diabetes mellitus, uncontrolled ([facility] [E11.65, Z79.4] 2015    Peripheral vascular disease ([facility] [address] [facility] [I73.9]     CAD (coronary artery disease) [I25.10] 10/29/2014    Schizoaffective disorder ([facility] [address] [F25.9] 2014     HPI   Jenae Urbina is a 61 y.o. female admitted for abd and back pain;   Pt lives at home alone and has [facility]; she states that she had a 1 day hx of abd and mid back pain; pt states HH RN gave her a NTG SL for c/o chest pain and EMS was called; pt does have chronic back pain and follows with Dr Shilo Storey; pt has a hx of CVA with aphasia so a through hx is difficult to obtain from pt and no family at bedside; she states she smokes 1 PPD; denies any home use of 02.     She states she has had a non productive cough \"off and on\"; she states she has had some shortness of breath; denies any previous hx of lung problems; she was hypoxic at 89% on arrival to ED; she did dev a temp of 100.1 at 0431 today; she had a CTA chest and CT WO chest that showed a LLL pneumonia along with a loculated pleural effusion on the left side; ABG's showed pH 7.31; PC02 61 her pulse is 67. Her respiration is 18 and oxygen saturation is 95%. O2 Flow Rate (L/min): 2 L/min  I/O    Intake/Output Summary (Last 24 hours) at 12/20/17 1232  Last data filed at 12/20/17 7546   Gross per 24 hour   Intake              870 ml   Output             1100 ml   Net             -230 ml     Patient Vitals for the past 96 hrs (Last 3 readings):   Weight   12/20/17 0334 180 lb (81.6 kg)   12/18/17 0306 189 lb 4.8 oz (85.9 kg)   12/17/17 0430 180 lb 6.4 oz (81.8 kg)     Exam   Constitutional: Patient appears moderately built and moderately nourished. Alert on NC and in no acute distress. Head: Normocephalic and atraumatic. Neck: Neck supple. No JVD or tracheal deviation present. Cardiovascular: Regular rate, regular rhythm, S1 and S2 with no murmur. No peripheral edema. NSR. Pulmonary/Chest: Normal effort with diminished breath sounds L>R. More clear on Right. Rales to left base. No wheezing. No stridor. No respiratory distress. Pigtail drain in place. No output. Abdominal: Soft. Bowel sounds audible. No distension, no tenderness, normal BS. + BM. Musculoskeletal: Moves all extremities with right BKA. Neurological: Alert and oriented with no focal deficits. Skin: Skin is warm and dry. No clubbing or cyanosis.       Labs   ABG  Lab Results   Component Value Date    PH 7.41 12/17/2017    PH 7.44 08/15/2015    PH 5.5 06/13/2012    PO2 71 12/17/2017    PO2 105 06/19/2012    PCO2 41 12/17/2017    PCO2 47 06/19/2012    HCO3 26 12/17/2017    HCO3 28.7 08/07/2015    O2SAT 94 12/17/2017     Lab Results   Component Value Date    IFIO2 6 12/17/2017    MODE CPAP/PS 12/16/2017    SETTIDVOL 590.0 06/18/2012    SETPEEP 6.0 12/16/2017     CBC  Recent Labs      12/18/17   0321   WBC  8.9   RBC  3.75*   HGB  11.9*   HCT  35.7*   MCV  95.3   MCH  31.8*   MCHC  33.4   RDW  13.5   PLT  179   MPV  9.6      BMP  Recent Labs      12/18/17   0321  12/20/17   0859   NA  138  141   K  3.8  4.0   CL  100  99   CO2  24 28   BUN  18  16   CREATININE  1.0  0.9   GLUCOSE  159*  216*   CALCIUM  9.0  9.1     LFT  Recent Labs      12/20/17   0859   AST  37   ALT  48   BILITOT  0.2*   ALKPHOS  219*     TROP  Lab Results   Component Value Date    TROPONINT 0.024 12/16/2017    TROPONINT 0.015 12/15/2017    TROPONINT 0.016 12/15/2017     BNP  Lab Results   Component Value Date    PROBNP 2714.0 12/14/2017    PROBNP 436.8 01/09/2015     D-Dimer  Lab Results   Component Value Date    DDIMER 1302.00 12/14/2017     Lactic Acid  No results for input(s): LACTA in the last 72 hours. INR  No results for input(s): INR, PROTIME in the last 72 hours. PTT  No results for input(s): APTT in the last 72 hours. Glucose  Recent Labs      12/19/17   2124  12/20/17   0610  12/20/17   1114   POCGLU  172*  151*  200*     UA   Recent Labs      12/18/17   1600   COLORU  DARK YELLOW   . PFTs   None in Epic  Echo 12/15/17   Normal left ventricle size and systolic function.   Ejection fraction was estimated at 54 to 60 %.   There were no regional left ventricular wall motion abnormalities .   Moderately increased left ventricle wall thickness.   Moderate concentric left ventricular hypertrophy.   Doppler parameters were consistent with abnormal left ventricular   relaxation (grade 1 diastolic dysfunction).   The left atrium is Mildly dilated.   There is moderate Left pleural effusions      Mitral Valve   The mitral valve structure was normal with normal leaflet separation.   DOPPLER: The transmitral velocity was within the normal range with no   evidence for mitral stenosis. Trace mitral regurgitation is present.      Aortic Valve   The aortic valve was trileaflet with normal thickness and cuspal   separation. DOPPLER: Transaortic velocity was within the normal range with   no evidence of aortic stenosis.  There was no evidence of aortic   regurgitation.      Tricuspid Valve   The tricuspid valve structure was normal with normal leaflet separation.   DOPPLER: There was no evidence of tricuspid stenosis.   Trivial tricuspid regurgitation visualized.      Pulmonic Valve   The pulmonic valve leaflets exhibited normal thickness, no calcification,   and normal cuspal separation. DOPPLER: The transpulmonic velocity was   within the normal range with no evidence for regurgitation.      Left Atrium   The left atrium is Mildly dilated.      Left Ventricle   Normal left ventricle size and systolic function. Ejection fraction was   estimated at 55 to 60 %. There were no regional left ventricular wall   motion abnormalities .   Moderately increased left ventricle wall thickness.   Moderate concentric left ventricular hypertrophy.   Doppler parameters were consistent with abnormal left ventricular   relaxation (grade 1 diastolic dysfunction).      Right Atrium   Right atrial size was normal.      Right Ventricle   The right ventricular size was normal with normal systolic function and   wall thickness.      Pericardial Effusion   The pericardium was normal in appearance with no evidence of a pericardial   effusion.   There is moderate Left pleural effusion.      Pleural Effusion   No evidence of pleural effusion.       Cultures    Procalcitonin  Lab Results   Component Value Date    PROCAL 0.29 12/17/2017    PROCAL 0.64 12/14/2017    PROCAL 0.06 01/10/2015     Influenza A/B (-)  Blood cx x 2 (-) prelim. Urine: e-coli  Legionella/strep antigen negative  MRSA screening: negative  VRE screening: negative  Pleural fluid: No growth to date  Pleural fluid cytology: Not sent     Radiology    CXR      CXR 12/20/17   1. Moderate cardiomegaly. Metallic sternotomy sutures. Small caliber pigtail catheter in the pleural space left side. No pneumothorax seen. 2. Large area of increased soft tissue density left mid and lower lung fields which appears be due to combination of atelectasis/pneumonia and a pleural effusion. Overall appearance slightly worse than prior.        CXR 12/19 and 12/18 with 12/17/17   1. Moderate cardiomegaly. Metallic sternotomy sutures. Small caliber pigtail catheter in the pleural space left side. No pneumothorax seen. 2. Large area of increased soft tissue density left mid and lower lung fields which appears be due to combination of atelectasis/pneumonia and a pleural effusion. Overall appearance slightly worse than prior.         Ultrasound of left lung 12/19       There is a small left pleural effusion. The left sided pigtail catheter is not visible.       There is no right pleural effusion.             CTA chest: 12/15/17  FINDINGS:   Lungs: There is bilateral multilobar atelectasis. There is left lower lobe consolidation felt to represent passive atelectasis related to the effusion. A component of pneumonia in the left lower lobe is not excluded. .       Pleura: There is a moderate multiloculated left pleural effusion. There is no pneumothorax.       Heart: There is mild cardiomegaly. Patient is status post sternotomy for CABG surgery.       Pulmonary vasculature: There is adequate opacification of the pulmonary arteries. There is no pulmonary embolism.       Bella and mediastinum: There is mild subcarinal adenopathy. No hilar adenopathy. There are left hilar calcified lymph nodes.       Thoracic aorta/vascular:? There is no thoracic aortic aneurysm or dissection. There is a probable severe stenosis of the origin of the left common carotid artery. .       Imaged upper abdomen: There is a splenic calcified granuloma. Remainder the imaged upper abdomen is unremarkable.       Musculoskeletal system: There is bilateral glenohumeral joint DJD. There is a nearly 1 cm well-defined corticated calcification anteromedial to the left glenohumeral joint likely representing an intra-articular loose body.  There are multilevel bridging    thoracic vertebral endplate osteophytes consistent with DISH.       Chest/body wall soft tissues: Unremarkable       Thyroid: Unremarkable     Impression       No acute pulmonary embolism. Moderate multiloculated left pleural effusion. Bilateral multilobar atelectasis. Component of pneumonia in the left lower lobe not excluded. Probable severe stenosis of the origin of the left common carotid artery. Additional findings as detailed above. CT chest WO: 12/14/17  FINDINGS: The heart is mildly enlarged. Atherosclerotic calcifications are present in the thoracic aorta and coronary arteries without evidence of aneurysm. There is no pericardial effusion. There is a small partially loculated left-sided pleural    effusion. There are areas of adjacent lung consolidation. In addition, there are more extensive alveolar opacities in the left lung. There is diffuse and irregular subpleural thickening, primarily at the lateral mid left hemithorax. There is a 2.5 cm    subpleural density at the posterior left mid lung (image 27). Calcified granulomas present in the left upper lobe. There are minimal reticular opacities in the right lower lung. There is no mediastinal, hilar or axillary lymphadenopathy. However,    evaluation is limited due to absence of contrast. Degenerative changes are present in the thoracic spine without evidence of aggressive osseous lesions.       Upper abdominal organs are evaluated on same-day CT of the abdomen and pelvis.           Impression   1. Partially loculated small left-sided pleural effusion with adjacent lung atelectasis/infiltrate. 2. Irregular pleural thickening in the left hemithorax highly suspicious for an infiltrating neoplastic process. 3. Focal subpleural nodule at the posterior left mid lung, possibly a pseudotumor secondary to trapped pleural fluid. However, malignancy cannot be excluded. 4. Minimal right basilar atelectasis/infiltrate. 5. Cardiomegaly       CT abd/pelvis 12/14/17       1. There is a fluid collection at the left lung base with overlying patchy density.  The fluid collection may

## 2017-12-20 NOTE — PROGRESS NOTES
Metaneb therapy was given to the patient. Vitals before treatment:    heart rate 73   respiratory rate 16  Breath sounds before treatment:  diminished breath sounds- throughout  Medication:  Medication delivered via Metaneb was Duoneb. Continuous High Frequency Oscillation (CHFO)   CHFO was applied to patient. Continuous Positive Expiratory Pressure (CPEP)  CPEP was applied to patient. Vitals during treatment:   heart rate 70   respiratory rate 16  Duration:    Therapy was given for 10 minutes. Tolerance:   Patient tolerated the procedure well.    Vitals after treatment:    heart rate 74   respiratory rate 16  Breath sounds after treatment:  diminished breath sounds- throughout  Cough/sputum:  nonproductive

## 2017-12-21 VITALS
SYSTOLIC BLOOD PRESSURE: 134 MMHG | HEIGHT: 65 IN | TEMPERATURE: 97.6 F | BODY MASS INDEX: 29.2 KG/M2 | HEART RATE: 78 BPM | RESPIRATION RATE: 16 BRPM | WEIGHT: 175.3 LBS | OXYGEN SATURATION: 91 % | DIASTOLIC BLOOD PRESSURE: 75 MMHG

## 2017-12-21 LAB
ANION GAP SERPL CALCULATED.3IONS-SCNC: 13 MEQ/L (ref 8–16)
BUN BLDV-MCNC: 15 MG/DL (ref 7–22)
CALCIUM SERPL-MCNC: 9.2 MG/DL (ref 8.5–10.5)
CHLORIDE BLD-SCNC: 99 MEQ/L (ref 98–111)
CO2: 32 MEQ/L (ref 23–33)
CREAT SERPL-MCNC: 1 MG/DL (ref 0.4–1.2)
GFR SERPL CREATININE-BSD FRML MDRD: 57 ML/MIN/1.73M2
GLUCOSE BLD-MCNC: 109 MG/DL (ref 70–108)
GLUCOSE BLD-MCNC: 114 MG/DL (ref 70–108)
GLUCOSE BLD-MCNC: 182 MG/DL (ref 70–108)
POTASSIUM SERPL-SCNC: 4.1 MEQ/L (ref 3.5–5.2)
SODIUM BLD-SCNC: 144 MEQ/L (ref 135–145)

## 2017-12-21 PROCEDURE — 6370000000 HC RX 637 (ALT 250 FOR IP): Performed by: INTERNAL MEDICINE

## 2017-12-21 PROCEDURE — APPSS30 APP SPLIT SHARED TIME 16-30 MINUTES: Performed by: NURSE PRACTITIONER

## 2017-12-21 PROCEDURE — 99233 SBSQ HOSP IP/OBS HIGH 50: CPT | Performed by: INTERNAL MEDICINE

## 2017-12-21 PROCEDURE — 80048 BASIC METABOLIC PNL TOTAL CA: CPT

## 2017-12-21 PROCEDURE — 97110 THERAPEUTIC EXERCISES: CPT

## 2017-12-21 PROCEDURE — 94640 AIRWAY INHALATION TREATMENT: CPT

## 2017-12-21 PROCEDURE — 6370000000 HC RX 637 (ALT 250 FOR IP): Performed by: NURSE PRACTITIONER

## 2017-12-21 PROCEDURE — 97530 THERAPEUTIC ACTIVITIES: CPT

## 2017-12-21 PROCEDURE — 92526 ORAL FUNCTION THERAPY: CPT

## 2017-12-21 PROCEDURE — 36415 COLL VENOUS BLD VENIPUNCTURE: CPT

## 2017-12-21 PROCEDURE — APPNB30 APP NON BILLABLE TIME 0-30 MINS: Performed by: NURSE PRACTITIONER

## 2017-12-21 PROCEDURE — 2580000003 HC RX 258: Performed by: FAMILY MEDICINE

## 2017-12-21 PROCEDURE — 94669 MECHANICAL CHEST WALL OSCILL: CPT

## 2017-12-21 PROCEDURE — 82948 REAGENT STRIP/BLOOD GLUCOSE: CPT

## 2017-12-21 PROCEDURE — 99232 SBSQ HOSP IP/OBS MODERATE 35: CPT | Performed by: INTERNAL MEDICINE

## 2017-12-21 PROCEDURE — 2700000000 HC OXYGEN THERAPY PER DAY

## 2017-12-21 PROCEDURE — 6370000000 HC RX 637 (ALT 250 FOR IP): Performed by: FAMILY MEDICINE

## 2017-12-21 RX ADMIN — IPRATROPIUM BROMIDE AND ALBUTEROL SULFATE 1 AMPULE: .5; 3 SOLUTION RESPIRATORY (INHALATION) at 07:44

## 2017-12-21 RX ADMIN — FAMOTIDINE 20 MG: 20 TABLET, FILM COATED ORAL at 08:18

## 2017-12-21 RX ADMIN — INSULIN LISPRO 3 UNITS: 100 INJECTION, SOLUTION INTRAVENOUS; SUBCUTANEOUS at 11:31

## 2017-12-21 RX ADMIN — OXYCODONE HYDROCHLORIDE AND ACETAMINOPHEN 1 TABLET: 5; 325 TABLET ORAL at 06:11

## 2017-12-21 RX ADMIN — Medication 10 ML: at 08:24

## 2017-12-21 RX ADMIN — METOPROLOL TARTRATE 25 MG: 25 TABLET ORAL at 08:18

## 2017-12-21 RX ADMIN — PREGABALIN 75 MG: 75 CAPSULE ORAL at 08:23

## 2017-12-21 RX ADMIN — DULOXETINE HYDROCHLORIDE 30 MG: 30 CAPSULE, DELAYED RELEASE ORAL at 08:18

## 2017-12-21 RX ADMIN — ASPIRIN 81 MG: 81 TABLET, COATED ORAL at 08:18

## 2017-12-21 RX ADMIN — IPRATROPIUM BROMIDE AND ALBUTEROL SULFATE 1 AMPULE: .5; 3 SOLUTION RESPIRATORY (INHALATION) at 11:56

## 2017-12-21 ASSESSMENT — PAIN SCALES - GENERAL
PAINLEVEL_OUTOF10: 6
PAINLEVEL_OUTOF10: 6

## 2017-12-21 NOTE — PLAN OF CARE
Problem: Falls - Risk of  Goal: Absence of falls  Outcome: Met This Shift  Pt was free from falls this shift, slid over to bedside commode when using the restroom. Staff assist.    Problem: Risk for Impaired Skin Integrity  Goal: Tissue integrity - skin and mucous membranes  Structural intactness and normal physiological function of skin and  mucous membranes. Outcome: Ongoing  Pt shows no increase in skin breakdown. Problem: Pain:  Goal: Pain level will decrease  Pain level will decrease   Outcome: Ongoing  Pt chronic back and leg pain is controlled through percocet. Problem: Discharge Planning:  Goal: Discharged to appropriate level of care  Discharged to appropriate level of care   Outcome: Ongoing  Pt discussing possible need for rehab. Pt wants to be home for [facility]    Problem: Aspiration:  Goal: Absence of aspiration  Absence of aspiration   Outcome: Met This Shift      Problem: Gas Exchange - Impaired:  Goal: Levels of oxygenation will improve  Levels of oxygenation will improve   Outcome: Ongoing  Pt remained on 2 L NC, tried to wean and pt O2 decreased to 89%, placed back on 2L nc    Comments: Care plan reviewed with patient. Patient verbalizes understanding of the care plan and contributed to goal setting.

## 2017-12-21 NOTE — PLAN OF CARE
Problem: Nutrition  Goal: Optimal nutrition therapy  Outcome: Ongoing  Nutrition Problem: Inadequate energy intake  Intervention: Food and/or Nutrient Delivery: Continue current diet, Discontinue ONS  Nutritional Goals: Pt. will consume 75% or more at meals safely during LOS.

## 2017-12-21 NOTE — PROGRESS NOTES
Multipurpose drainage catheter left chest, previous of fluid around the catheter has resolved and there is no residual in the region of the drainage catheter. **This report has been created using voice recognition software. It may contain minor errors which are inherent in voice recognition technology. **      Final report electronically signed by Dr. Batsheva Portillo on 12/20/2017 12:45 PM      US CHEST INCLUDING MEDIASTINUM   Final Result      XR CHEST STANDARD (2 VW)   Final Result   1. Moderate cardiomegaly. Metallic sternotomy sutures. Small caliber pigtail catheter in the pleural space left side. No pneumothorax seen. 2. Large area of increased soft tissue density left mid and lower lung fields which appears be due to combination of atelectasis/pneumonia and a pleural effusion. Overall appearance slightly worse than prior. **This report has been created using voice recognition software. It may contain minor errors which are inherent in voice recognition technology. **      Final report electronically signed by Dr. Karley Thorpe on 12/19/2017 10:17 AM      XR Chest Portable   Final Result      Interval placement of a left pleural pigtail catheter with significant improvement in the large left pleural effusion. New mid left lung atelectasis. Persistent left basilar opacity as discussed above. Stable interstitial pulmonary edema and pulmonary venous congestion. **This report has been created using voice recognition software. It may contain minor errors which are inherent in voice recognition technology. **      Final report electronically signed by Dr. Sandro Guzman on 12/18/2017 7:09 AM      CT GUIDED NEEDLE PLACEMENT   Final Result   Status post successful left sided 8 Jordanian locking pigtail drainage catheter placement into the left pleural space. **This report has been created using voice recognition software.   It may contain minor errors which are inherent in voice recognition technology. **         Final report electronically signed by Dr. Alda Martínez on 12/17/2017 3:21 PM      CT Guided Chest Tube   Final Result   Status post successful left sided 8 Maori locking pigtail drainage catheter placement into the left pleural space. **This report has been created using voice recognition software. It may contain minor errors which are inherent in voice recognition technology. **         Final report electronically signed by Dr. Alda Martínez on 12/17/2017 3:21 PM      XR Chest Portable   Final Result      Large left pleural effusion. Underlying volume loss likely, cannot exclude pneumonia. Medial right basilar atelectasis. Cardiomegaly status post CABG surgery. **This report has been created using voice recognition software. It may contain minor errors which are inherent in voice recognition technology. **      Final report electronically signed by Dr. Zachary Kimball on 12/17/2017 5:44 AM      CTA CHEST W WO CONTRAST   Final Result      No acute pulmonary embolism. Moderate multiloculated left pleural effusion. Bilateral multilobar atelectasis. Component of pneumonia in the left lower lobe not excluded. Probable severe stenosis of the origin of the left common carotid artery. Additional findings as detailed above. **This report has been created using voice recognition software. It may contain minor errors which are inherent in voice recognition technology. **      Final report electronically signed by Dr. Zachary Kimball on 12/15/2017 2:55 AM      CT CHEST WO CONTRAST   Final Result   1. Partially loculated small left-sided pleural effusion with adjacent lung atelectasis/infiltrate. 2. Irregular pleural thickening in the left hemithorax highly suspicious for an infiltrating neoplastic process. 3. Focal subpleural nodule at the posterior left mid lung, possibly a pseudotumor secondary to trapped pleural fluid.  However, malignancy [I25.10] 10/29/2014    Schizoaffective disorder ([facility] [address] [F25.9] 03/02/2014       · Continue empiric antibiotics, possible parapneumonic complex effusion, appreciate Pulmonary support, will consult ID for recommendations of length of therapy  · Improving continue respiratory protocol  · Possible empyema, appreciate CT surgery recommendations, agree with IR drainage   · CT surgery does not feel she is a surgical candidate at this time, will require outpatient follow up  · Continue pain control   · Tobacco cessation   · ISS, stable  · Continue aspirin  · Appreciate cardiology support, Stress test negative, off heparin drip   · Continue home meds    Diet: DIET DYSPHAGIA II MECHANICALLY ALTERED; Carb Control: 4 carbs/meal (approximate 1800 kcals/day)  Electronically signed by Dina Longoria MD on 12/21/2017 at 7:38 AM

## 2017-12-21 NOTE — PROGRESS NOTES
0800 pt resting in bed assessment completed and charted     0900 Dr. Sheridan Petersen up to see pt for consult, Monica Olivera CNP for pulmonary up and talked with Dr. Sheridan Petersen and to Dr. Meyer about patient    0930 pt is being transferred to [facility] per doctors request    1200 daughter called in and updated on the transfer to [address] [place] [address] called with a bed    [phone] called [facility] and they said will be 330 p.m.  For      [phone] tried to call report to [facility] unable to give report at this time    [phone] found pt sitting at side of bed fully dressed with INT pulled out and heart monitor off, talked with patient, pt confused to what was going on, after talking with patient pt understood     [phone] tried to call report again unable to d/t nurse busy    1600 reported off to [employer]

## 2017-12-21 NOTE — PROGRESS NOTES
[address]  OCCUPATIONAL THERAPY MISSED TREATMENT NOTE  [facility] CCU-STEPDOWN 3B  3B-/021-A      Date: 2017  Patient Name: Felipe Wilson        CSN: [id number]   : 1960  (61 y.o.)  Gender: female   Referring Practitioner: Sammy Galarza CNP  Diagnosis: PNA         REASON FOR MISSED TREATMENT:  Missed Treat. Attempt x 1, ST with Pt. Attempt x 2, nurse getting Pt ready for discharge to [place].

## 2017-12-21 NOTE — FLOWSHEET NOTE
12/20/17 1955   Encounter Summary   Services provided to: Patient   Continue Visiting Yes  (12/20-receiving care-need support)   Spiritual/Mormonism   Type Spiritual support       Pt was receiving care from staff. Plan:  Provide spiritual care support.

## 2017-12-21 NOTE — PROGRESS NOTES
Nutrition Assessment    Type and Reason for Visit: Reassess (PO Monitor)    Nutrition Recommendations: Diet advanced to Dysphagia III per SLP. Nutritional supplements discontinued on 12/15/17. Recommend a daily MVI d/t pt's poor po intake. Malnutrition Assessment:  · Malnutrition Status: At risk for malnutrition    Nutrition Diagnosis:   · Problem: Inadequate energy intake  · Etiology: related to Insufficient energy/nutrient consumption     Signs and symptoms:  as evidenced by Patient report of    Nutrition Assessment:  · Subjective Assessment: Pt admitted with (PNA) pneumonia. Per chart documentation: CT chest shows scattered pockets of loculated pleural fluid. CTS stated she is too high risk for decortication and recommend placing an additional pigtail catheter and TPA for 4-5 days. Initial pigtail is now draining 300 yesterday and 150 mL overnight. Awaiting a bed at [facility]. Pt just finished with SLP who advanced pt's diet to Dysphagia III, Carb Controlled (4 carbs/meal). Pt seen. Stated that she is still not very hungry. Glucerna was cancelled on 12/15/17. Pt stated that she really doesn't want a nutritional supplement. Hoping that now that diet is advanced, that she will feel more like eating. Pt appears tired - not very talkative. Kept visit brief. Labs reviewed - blood sugars still elevated (insulin). IV Rocephine continues. + BM just awhile ago per pt. Would still suggest a daily MVI d/t pt's poor po intake. · Wound Type: Unstageable (buttocks right )  · Current Nutrition Therapies:  · Oral Diet Orders: Dysphagia 3, Carb Control 4 Carbs/Meal, No Straws   · Oral Diet intake: 26-50%  · Oral Nutrition Supplement (ONS) Orders: None  · Anthropometric Measures:  · Ht: 5' 5\" (165.1 cm)   · Current Body Wt: 175 lb 4.8 oz (79.5 kg)  · Admission Body Wt: 182 lb 12.2 oz (82.9 kg) (12/14)  · Usual Body Wt: 169 lb 15.6 oz (77.1 kg) (9/7/17 per EMR)  · Ideal Body Wt: 118 lb (53.5 kg) (adjust for rt.  BKA), % Ideal Body 160%  · Adjusted Body Wt: 133 lb (60.3 kg) (adjusted for Rt BKA), body weight adjusted for    · BMI Classification: BMI 30.0 - 34.9 Obese Class I  · Comparative Standards (Estimated Nutrition Needs):  · Estimated Daily Total Kcal: 3286-5145  · Estimated Daily Protein (g): 60-72 grams    Estimated Intake vs Estimated Needs: Intake Less Than Needs    Nutrition Risk Level: High    Nutrition Interventions:   Continue current diet, Discontinue ONS  Continued Inpatient Monitoring, Education not appropriate at this time    Nutrition Evaluation:   · Evaluation: No progress toward goals   · Goals: Pt. will consume 75% or more at meals safely during LOS. · Monitoring: Diet Progression, Meal Intake, Diet Tolerance, Wound Healing, Weight, Pertinent Labs, Chewing/Swallowing    See Adult Nutrition Doc Flowsheet for more detail.      Electronically signed by Murali Blanco RD, LD on 12/21/17 at 3:25 PM    Contact Number: [phone]

## 2017-12-21 NOTE — PROGRESS NOTES
[address]  INPATIENT PHYSICAL THERAPY  [facility] CCU-STEPDOWN 3B    Time In: 6992  Time Out: 1158  Timed Code Treatment Minutes: 23 Minutes  Minutes: 23          Date: 2017  Patient Name: Jaylan Sanchez,  Gender:  female        MRN: [id number]  : 1960  (61 y.o.)     Referring Practitioner: Florecita Pagan CNP  Diagnosis: Pneumonia  Additional Pertinent Hx: The patient is a 61 y.o. female with h/o Anxiety; PVD, S/p right BKA, CAD, s/p CABG, s/p CVA,  Diabetes, Chronic back pain; Chronic pain; Depression; Fibromyalgia; Hyperlipidemia; Hypertension;  Neuropathy, RSD, Schizoaffective disorder, Stroke with Receptive aphasia who presents to the Emergency Department for the evaluation of abdominal and back pain. Patient is not able to give any history and daughter is no longer at the bedside. Per ED chart review, her abdominal pain started yesterday,  is intermittent and is made worse when she moves. Patient is not ambulating and uses wheelchair to get around. She does have home health nurses provide periodic care at her home. Per nursing staff on the medical floor, patient's daughter notified her that patient had taken her home percocet and Metformin in the ED without notifying ED staff. Patient's daughter also states that patient is prone to over medicating herself with her pain med. Patient shows me her amputated limb when I asked if she had any pain. Patient was noted to have complained of chest pain yesterday and was given Nitro by home health nurse. Patient's troponin is elevated at 0.020 and it is being trended. Patient was hypotensive with SBP- 81/66 so fluid bolus was given. Heparin infusion has been started and Cardiology consulted. Pt to have stress test .      Past Medical History:   Diagnosis Date    Anxiety     Arthritis     Atherosclerotic PVD with ulceration: Left foot ulcer 2013    3/29/2013: BOGDAN RT : 0.50, LT : 0.42     CAD (coronary artery disease)  Chest pressure     Chronic back pain     Chronic pain     Depression     Fibromyalgia     Hyperlipidemia     Hypertension     Mild mitral regurgitation     Neuropathy ([address]     Onychomycosis     RSD (reflex sympathetic dystrophy)     S/P CAB2013: CABG X 3     Schizoaffective disorder ([address]     Stroke ([facility] [address] 12    no deficits    Type II or unspecified type diabetes mellitus without mention of complication, not stated as uncontrolled      Past Surgical History:   Procedure Laterality Date    CARDIAC SURGERY      CARDIOVASCULAR STRESS TEST  12    normal LV size and fx, mildly intense perfusion defect of the anterior wall which is reversible on rest images.     CHOLECYSTECTOMY      CORONARY ARTERY BYPASS GRAFT  2012    FOOT SURGERY Right 2014    DEBRIDEMENT HEEL    HYSTERECTOMY      LEG AMPUTATION BELOW KNEE Right 14    Dr. Macy Napoles N/A 2017    LUMBAR SNRB performed by Crystal Brink MD at [place] [address] Right 10/31/14    Debridement of Right Below Knee Amputation Stump and Reclosure of Stump - Dr. Gianluca Ospina Bilateral 2017    Lumbar Facet MBB L3-4, L4-5, L5-S1    OTHER SURGICAL HISTORY Left 2017    Lumbar Sympathetic Block, Left Side    OTHER SURGICAL HISTORY Bilateral 2017    lumbar smpathetic nerve block bilateral    TRANSTHORACIC ECHOCARDIOGRAM  12    EF 55-60% mild mitral regurg       Restrictions/Precautions:  Restrictions/Precautions: General Precautions, Fall Risk, Contact Precautions (MRSA)            Position Activity Restriction  Other position/activity restrictions: R BKA, h/o CVA with expressive aphasia, confusion         Subjective:     Subjective: pt tearful at times upset about her news going to [address] [place] [address] for further work up, she was agreeable for therapy however was just up to commode and refused to sit up in chair following session and wanting to return to bed     Pain:   .  Pain Assessment  Pain Level: 6 (in left LE )       Social/Functional:  Lives With: Alone  Type of Home: House  Home Layout: One level  Home Access: Ramped entrance  Home Equipment: [employer] [address], 4 wheeled walker     Objective:  Rolling to Right: Minimal assistance  Supine to Sit: Minimal assistance (with HOB flat and no rail, pt took several attempts and was trying to come up into long sitting first needed assist at trunk )  Scooting: Contact guard assistance (to edge of bed)    Transfers  Stand Pivot Transfers: Minimal Assistance (pt completed partial stand pivot transfer she has her own technique where she holds onto the armrest and pivoted over she had more difficulty going to the right vs left completed 2 times as she wanted to lay back down in bed )    Balance  Comments: pt sat edge of bed with supervision completed reaching activity out of base of support ~ 2-3 in with SBA however at times she would lean elbow on bed        Exercises:  Exercises  Comments: pt completed ex for increased strength pt completed left ankle pumps followed with heelcord stretch due to tightness able to stretch to neutral, quad sets with tactile cues lacking full extension at miguel angel LEs, hip and knee flexion and extension gave slight over pressure at left LE as she has only ~ 90 degrees of knee flexion, short arc quads, modified bridges over roll with limited lift off, hip abd/add, straight leg raises x 10-12 rpes each pt c/o of pain in left LE 6/10 with no increase with activity      Activity Tolerance:  Activity Tolerance: Patient limited by fatigue;Patient limited by endurance; Patient limited by cognitive status    Assessment:   Body structures, Functions, Activity limitations: Decreased functional mobility , Decreased endurance, Decreased balance, Decreased strength, Decreased safe awareness, Decreased cognition  Assessment: pt tolerated session fair cont to require assist for bed mobility and transfers she is planning on going to [facility] for further work up  Prognosis: Fair  REQUIRES PT FOLLOW UP: Yes  Discharge Recommendations: Patient would benefit from continued therapy after discharge, Continue to assess pending progress    Patient Education:  Patient Education: POC, recommendation for rehab prior to going home, transfers    Equipment Recommendations:  Equipment Needed: No    Safety:  Type of devices: Left in chair, Call light within reach, Chair alarm in place, Gait belt, Patient at risk for falls, Nurse notified, All fall risk precautions in place  Restraints  Initially in place: No    Plan:  Times per week: 5 X GM  Times per day: Daily  Specific instructions for Next Treatment: B LE strengthening, bed mobility, transfer training, w/c mobility. Current Treatment Recommendations: Strengthening, Neuromuscular Re-education, Safety Education & Training, Balance Training, Endurance Training, Functional Mobility Training, Transfer Training, Patient/Caregiver Education & Training, Wheelchair Mobility Training    Goals:  Patient goals : To go home. Short term goals  Time Frame for Short term goals: 2 weeks  Short term goal 1: Pt to transfer supine <--> sit SBA to enable pt to get in/out of bed. Short term goal 2: Pt to transfer sit <--> stand CGA for increased functional mobility. Short term goal 3: Pt to transfer sit pivot CGA for increased functional mobility. Short term goal 4: Pt to self propel in manual w/c 200 feet SBA for household mobility. Long term goals  Time Frame for Long term goals : NA due to short length of stay.

## 2017-12-21 NOTE — PROGRESS NOTES
[facility] 3B    TIME   SLP Individual Minutes  Time In: 1617  Time Out: 0176  Minutes: 14  Timed Code Treatment Minutes: 0 Minutes       [x]Daily Note  []Progress Note  []Discharge Note    Date: 2017  Patient Name: Valeri Jordan        MRN: [id number]    : 1960  (61 y.o.)  Gender: female   Primary Provider: Delgado Riley MD  Admitting Diagnosis:  Pneumonia   Secondary Diagnosis: Dysphagia  Precautions: Aspiration  Swallowing Status/Diet: Dysphagia III with thin-upgrade    Swallowing Strategies: Upright position  DATE of last BSE:  12/15    Subjective: Pt seen resting in bed. Very significant flat affect and poor overall engagement with ST this date. Pt emotionally upset; patient stating to ST \"I am going to die. \"  Pt refused to elaborate or provide further explanation of thought. ST provided encouragement and support. ST notified RN of pt's comment. Recommend spiritual support. SHORT TERM GOAL #1:  Goal 1: Pt will tolerate Dysphagia II and thin diet without overt aspiration to ensure safe and adequate nutrition and hydration  INTERVENTIONS: Patient reports good success with current diet; overall poor appetite. RN reports patient refusing the majority of PO intake. SHORT TERM GOAL #2:  Goal 2: Pt will tolerate trials of advanced solids without overt aspiration to 10/10 tmes to advance pt to least restricitive diet. INTERVENTIONS: Pt agreeable to advanced trials of toast with butter and jelly and thin liquids. Patient demonstrated with functional mastication, bolus formation, timely swallow, effective oral clearance. Patient only agreeable to 1/2 of piece of toast, refused additional trials. No s/s of aspiration noted. Recommend advanced diet to dysphagia III with thin liquids. RN notified of diet advancement. ST provided education and additional encouragement to patient.        SHORT TERM GOAL #3:  Goal 3: Monitor

## 2017-12-21 NOTE — CONSULTS
left basilar atelectasis and  consolidation and scattered focus of pleural fluid, likely loculated. There was a drainage tube placed and three days of fluid around the  catheter has resolved and there is no residual in the region of the  drainage catheter. Still, she has pockets of collection. IMPRESSION:  She is a 79-year-old female patient admitted with pneumonia. She has parapneumonic loculated pleural effusion. PleurX catheter has been  placed; however, she has multiple pockets of fluid collection that I do not  think will resolve with thrombolytic treatment. RECOMMENDATIONS:  My recommendation is that this patient may need  video-assisted thoracoscopic surgery with possible decortication. The  patient has comorbidities and her cardiothoracic surgeon has signed off of  the case and recommended TPA. Unfortunately, I feel that this patient  needs further treatment and recommend to send the patient to a tertiary  center for further treatment. I spoke with Pulmonary Team and we will make  arrangement to transfer the patient to a tertiary center for further  treatment.         Jasvir Candelaria M.D.    D: 12/21/2017 10:10:24       T: 12/21/2017 11:37:23     [email or web address]  Job#: [id number]     Doc#: [id number]    CC:

## 2017-12-21 NOTE — PROGRESS NOTES
TROPONINT 0.016 12/15/2017     BNP  Lab Results   Component Value Date    PROBNP 2714.0 12/14/2017    PROBNP 436.8 01/09/2015     D-Dimer  Lab Results   Component Value Date    DDIMER 1302.00 12/14/2017     Lactic Acid  No results for input(s): LACTA in the last 72 hours. INR  No results for input(s): INR, PROTIME in the last 72 hours. PTT  No results for input(s): APTT in the last 72 hours. Glucose  Recent Labs      12/20/17   1605  12/20/17   1945  12/21/17   0606   POCGLU  122*  178*  109*     UA   Recent Labs      12/18/17   1600   COLORU  DARK YELLOW   . PFTs   None in Epic  Echo 12/15/17   Normal left ventricle size and systolic function.   Ejection fraction was estimated at 54 to 60 %.   There were no regional left ventricular wall motion abnormalities .   Moderately increased left ventricle wall thickness.   Moderate concentric left ventricular hypertrophy.   Doppler parameters were consistent with abnormal left ventricular   relaxation (grade 1 diastolic dysfunction).   The left atrium is Mildly dilated.   There is moderate Left pleural effusions      Mitral Valve   The mitral valve structure was normal with normal leaflet separation.   DOPPLER: The transmitral velocity was within the normal range with no   evidence for mitral stenosis. Trace mitral regurgitation is present.      Aortic Valve   The aortic valve was trileaflet with normal thickness and cuspal   separation. DOPPLER: Transaortic velocity was within the normal range with   no evidence of aortic stenosis. There was no evidence of aortic   regurgitation.      Tricuspid Valve   The tricuspid valve structure was normal with normal leaflet separation.   DOPPLER: There was no evidence of tricuspid stenosis.   Trivial tricuspid regurgitation visualized.      Pulmonic Valve   The pulmonic valve leaflets exhibited normal thickness, no calcification,   and normal cuspal separation.  DOPPLER: The transpulmonic velocity was   within the normal range atelectasis/pneumonia and a pleural effusion. Overall appearance slightly worse than prior.         Ultrasound of left lung 12/19       There is a small left pleural effusion. The left sided pigtail catheter is not visible.       There is no right pleural effusion.         CT scans      CT Scan 12/20/17   Left basilar atelectasis and consolidation and scattered pockets of pleural fluid likely loculated pleural fluid. Most of the lung opacity on the chest x-ray is likely related to the atelectatic left lower lobe. 2. Multipurpose drainage catheter left chest, previous of fluid around the catheter has resolved and there is no residual in the region of the drainage catheter. (See actual reports for details)    Assessment   CAP, LLL with parapneumonic s/p Pig tail catheter placement: Exudate by TP criteria. Repeat CT scan shows multiple pockets of fluid with infiltrate/atelectasis. CTS consulted and recommend a second pigtail with TPA (high surgical risk for decortication). Acute Hypercapneic/Hypoxic Respiratory Failure: Improved  UTI: E-coli on Rocephin  Chronic Diastolic HF   Probable COPD with smoking hx: No signs of acute exacerbation  Possible JAROD (sleep questionnaire done and study ordered)  Tobacco Abuse with 1 PPD  CAD with CABG elevated troponin: Stress test showed no ischemia  Hx CVA with expressive dysphasia  Dysphagia on level II diet  Type I DM: fluctuating sugars  Essential HTN: Controlled  KP likely from contrast: Resolved  PAD with RBKA  Deconditioning  Full Code  Recommendations   I spoke with Dr. Yasmin Adamson for he recommended Pigtail and TPA but signed off. He stated that anyone can inject TPA into catheter and asked that I have Dr. Joesph Herrera call him. Also spoke with Dr. Doroteo Julien and we are in agreement that an additional catheter and TPA will not be beneficial in treating the empyema as there are multiple pockets of fluid. Recommend transferring to [facility] for high risk decortication.  Updated  Petar who is in agreement and spoke with Dr. Nikole Greenberg who will initiate the process. Order cancelled for additional pigtail catheter placement with IR. Continue with 1st catheter as it continues to drain  Wean 02 to to keep sats >90%  Continue Rocephin   Continue Duo Nebs every 4 hours WA  IS/accapella  Nicotine patch  Speech therapy following  Continue PT/OT  DVT prophylaxis: Lovenox  Outpatient sleep study ordered      Case discussed with nurse/patient/Dr. Dylon Deutsch, Dr. Nikole Greenberg and Dr. Karen Davis. Questions and concerns addressed. Meds and Orders reviewed. Electronically signed by   Omar Peace CNP on 12/21/2017 at 8:19 AM     Addendum by Dr. Karen Davis MD:  I have seen and examined the patient independently. Face to face evaluation and examination was performed. The above evaluation and note has been reviewed. Labs and radiographs were reviewed. I Have discussed with Ms. Daniel Londono. JOSEP Escobar about this patient in detail. D/w Patient's R.N. and specific instructions given. Patient issues addressed. The above assessment and plan has been reviewed. Please see my modifications mentioned below. My modifications:  She is on O2 via nasal cannula. All cultures were negative so far. Discussed with MD Evan regarding patient condition and management plan. He told me that due to patient's debilitated condition, he is not going to operate on her. He requested me to do tPA administration.  - Monica GROSS Archereducated about my impression and plan. She verbalizes understanding.       Rojelio Guerra MD 12/21/2017 12:46 PM

## 2017-12-22 NOTE — DISCHARGE SUMMARY
(Last 24 hours) at 12/22/17 1147  Last data filed at 12/21/17 1203   Gross per 24 hour   Intake              240 ml   Output                0 ml   Net              240 ml     General appearance:  No apparent distress, appears stated age and cooperative. HEENT:  Normal cephalic, atraumatic without obvious deformity. Pupils equal, round, and reactive to light. Extra ocular muscles intact. Conjunctivae/corneas clear. Neck: Supple, with full range of motion. No jugular venous distention. Trachea midline. Respiratory:  Normal respiratory effort. Clear to auscultation, bilaterally without Rales/Wheezes/Rhonchi. Cardiovascular:  Regular rate and rhythm with normal S1/S2 without murmurs, rubs or gallops. Abdomen: Soft, non-tender, non-distended with normal bowel sounds. Musculoskeletal:  No clubbing, cyanosis or edema bilaterally. Full range of motion without deformity. Labs: For convenience and continuity at follow-up the following most recent labs are provided:  CBC:    Lab Results   Component Value Date    WBC 8.9 12/18/2017    HGB 11.9 12/18/2017    HCT 35.7 12/18/2017     12/18/2017     Renal:  Lab Results   Component Value Date     12/21/2017    K 4.1 12/21/2017    CL 99 12/21/2017    CO2 32 12/21/2017    BUN 15 12/21/2017    CREATININE 1.0 12/21/2017    CALCIUM 9.2 12/21/2017    PHOS 3.6 08/16/2015     Significant Diagnostic Studies  Radiology:   CT CHEST W CONTRAST   Final Result   Left basilar atelectasis and consolidation and scattered pockets of pleural fluid likely loculated pleural fluid. Most of the lung opacity on the chest x-ray is likely related to the atelectatic left lower lobe. 2. Multipurpose drainage catheter left chest, previous of fluid around the catheter has resolved and there is no residual in the region of the drainage catheter. **This report has been created using voice recognition software.   It may contain minor errors which are inherent in voice recognition ibuprofen (ADVIL;MOTRIN) 100 MG/5ML suspension  take 40 milliliters by mouth every 8 hours if needed             insulin glargine (LANTUS SOLOSTAR) 100 UNIT/ML injection  Inject 30 Units into the skin nightly             insulin lispro (HUMALOG KWIKPEN) 100 UNIT/ML pen  Inject 0-18 Units into the skin 3 times daily (before meals) 150-200 = 3 units, 201-250 = 6 units, 251-300 = 9 units, 301-350 = 12 units, 351-400 = 15 units             lisinopril (PRINIVIL;ZESTRIL) 20 MG tablet  Take 1 tablet by mouth daily             metFORMIN (GLUCOPHAGE) 500 MG tablet  Take 1 tablet by mouth 2 times daily (with meals)             metoprolol tartrate (LOPRESSOR) 25 MG tablet  Take 1 tablet by mouth 2 times daily             nitroGLYCERIN (NITROSTAT) 0.4 MG SL tablet  Place 1 tablet under the tongue every 5 minutes as needed for Chest pain. oxyCODONE-acetaminophen (PERCOCET) 7.5-325 MG per tablet  Take 1 tablet by mouth every 6 hours as needed for Pain . pregabalin (LYRICA) 75 MG capsule  Take 1 capsule by mouth every 6 hours . rosuvastatin (CRESTOR) 10 MG tablet  Take 10 mg by mouth nightly             SEROQUEL  MG XR tablet  take 1 tablet by mouth every evening                 Time Spent on discharge coordinating care, examination, evaluation, counseling and review of medications and discharge plan 35 minutes. Signed: Thank you Asher Infante MD for the opportunity to be involved in this patient's care.     Electronically signed by Lorenso Crigler, MD on 12/22/2017 at 11:47 AM

## 2017-12-23 LAB
ANAEROBIC CULTURE: NORMAL
BODY FLUID CULTURE, STERILE: NORMAL
GRAM STAIN RESULT: NORMAL

## 2017-12-28 ENCOUNTER — CARE COORDINATION (OUTPATIENT)
Dept: CASE MANAGEMENT | Age: 57
End: 2017-12-28

## 2018-01-22 LAB
FUNGUS IDENTIFIED: NORMAL
FUNGUS SMEAR: NORMAL

## 2018-02-05 LAB
AFB CULTURE & SMEAR: NORMAL
MICROSCOPIC OBSERVATION: NORMAL

## (undated) DEVICE — GLOVE ORANGE PI 7 1/2   MSG9075

## (undated) DEVICE — NEEDLE SPNL 22GA L7IN SPINOCAN

## (undated) DEVICE — SHEET,DRAPE,3/4,53X77,STERILE: Brand: MEDLINE

## (undated) DEVICE — SYRINGE MED 10ML LUERLOCK TIP W/O SFTY DISP

## (undated) DEVICE — NEEDLE SYR 18GA L1.5IN RED PLAS HUB S STL BLNT FILL W/O

## (undated) DEVICE — HYPODERMIC SAFETY NEEDLE: Brand: MAGELLAN

## (undated) DEVICE — SOLUTION IV 1000ML 0.9% SOD CHL PH 5 INJ USP VIAFLX PLAS

## (undated) DEVICE — CHLORAPREP 26ML CLEAR

## (undated) DEVICE — NEEDLE SPNL 22GA L3.5IN BLK HUB S STL REG WALL FIT STYL W/

## (undated) DEVICE — TOWEL,OR,DSP,ST,BLUE,DLX,4/PK,20PK/CS: Brand: MEDLINE

## (undated) DEVICE — SYRINGE MED 30ML STD CLR PLAS LUERLOCK TIP N CTRL DISP

## (undated) DEVICE — 6 ML SYRINGE LUER-LOCK TIP: Brand: MONOJECT

## (undated) DEVICE — GAUZE,SPONGE,4"X4",12PLY,STERILE,LF,2'S: Brand: MEDLINE